# Patient Record
Sex: MALE | Race: ASIAN | Employment: OTHER | URBAN - METROPOLITAN AREA
[De-identification: names, ages, dates, MRNs, and addresses within clinical notes are randomized per-mention and may not be internally consistent; named-entity substitution may affect disease eponyms.]

---

## 2017-01-18 ENCOUNTER — OFFICE VISIT (OUTPATIENT)
Dept: PEDIATRICS | Facility: CLINIC | Age: 62
End: 2017-01-18
Payer: COMMERCIAL

## 2017-01-18 VITALS
WEIGHT: 137 LBS | HEART RATE: 69 BPM | OXYGEN SATURATION: 96 % | SYSTOLIC BLOOD PRESSURE: 110 MMHG | DIASTOLIC BLOOD PRESSURE: 70 MMHG | HEIGHT: 62 IN | TEMPERATURE: 98.9 F | BODY MASS INDEX: 25.21 KG/M2

## 2017-01-18 DIAGNOSIS — E78.5 HYPERLIPIDEMIA LDL GOAL <100: ICD-10-CM

## 2017-01-18 DIAGNOSIS — Z11.59 NEED FOR HEPATITIS C SCREENING TEST: ICD-10-CM

## 2017-01-18 DIAGNOSIS — I10 ESSENTIAL HYPERTENSION: ICD-10-CM

## 2017-01-18 DIAGNOSIS — E11.9 TYPE 2 DIABETES MELLITUS WITHOUT COMPLICATION, WITHOUT LONG-TERM CURRENT USE OF INSULIN (H): Primary | ICD-10-CM

## 2017-01-18 LAB
ALBUMIN SERPL-MCNC: 3.7 G/DL (ref 3.4–5)
ALP SERPL-CCNC: 78 U/L (ref 40–150)
ALT SERPL W P-5'-P-CCNC: 19 U/L (ref 0–70)
ANION GAP SERPL CALCULATED.3IONS-SCNC: 6 MMOL/L (ref 3–14)
AST SERPL W P-5'-P-CCNC: 13 U/L (ref 0–45)
BILIRUB SERPL-MCNC: 0.8 MG/DL (ref 0.2–1.3)
BUN SERPL-MCNC: 13 MG/DL (ref 7–30)
CALCIUM SERPL-MCNC: 9.1 MG/DL (ref 8.5–10.1)
CHLORIDE SERPL-SCNC: 101 MMOL/L (ref 94–109)
CHOLEST SERPL-MCNC: 171 MG/DL
CO2 SERPL-SCNC: 30 MMOL/L (ref 20–32)
CREAT SERPL-MCNC: 1.06 MG/DL (ref 0.66–1.25)
CREAT UR-MCNC: 37 MG/DL
GFR SERPL CREATININE-BSD FRML MDRD: 71 ML/MIN/1.7M2
GLUCOSE SERPL-MCNC: 156 MG/DL (ref 70–99)
HBA1C MFR BLD: 6.9 % (ref 4.3–6)
HCV AB SERPL QL IA: ABNORMAL
HDLC SERPL-MCNC: 53 MG/DL
LDLC SERPL CALC-MCNC: 93 MG/DL
MICROALBUMIN UR-MCNC: 11 MG/L
MICROALBUMIN/CREAT UR: 30.19 MG/G CR (ref 0–17)
NONHDLC SERPL-MCNC: 118 MG/DL
POTASSIUM SERPL-SCNC: 4.3 MMOL/L (ref 3.4–5.3)
PROT SERPL-MCNC: 7.3 G/DL (ref 6.8–8.8)
SODIUM SERPL-SCNC: 137 MMOL/L (ref 133–144)
TRIGL SERPL-MCNC: 126 MG/DL

## 2017-01-18 PROCEDURE — 80061 LIPID PANEL: CPT | Performed by: INTERNAL MEDICINE

## 2017-01-18 PROCEDURE — 86803 HEPATITIS C AB TEST: CPT | Performed by: INTERNAL MEDICINE

## 2017-01-18 PROCEDURE — 82043 UR ALBUMIN QUANTITATIVE: CPT | Performed by: INTERNAL MEDICINE

## 2017-01-18 PROCEDURE — 99207 C FOOT EXAM  NO CHARGE: CPT | Performed by: INTERNAL MEDICINE

## 2017-01-18 PROCEDURE — 87522 HEPATITIS C REVRS TRNSCRPJ: CPT | Performed by: INTERNAL MEDICINE

## 2017-01-18 PROCEDURE — 83036 HEMOGLOBIN GLYCOSYLATED A1C: CPT | Performed by: INTERNAL MEDICINE

## 2017-01-18 PROCEDURE — 36415 COLL VENOUS BLD VENIPUNCTURE: CPT | Performed by: INTERNAL MEDICINE

## 2017-01-18 PROCEDURE — 99214 OFFICE O/P EST MOD 30 MIN: CPT | Performed by: INTERNAL MEDICINE

## 2017-01-18 PROCEDURE — 80053 COMPREHEN METABOLIC PANEL: CPT | Performed by: INTERNAL MEDICINE

## 2017-01-18 NOTE — PROGRESS NOTES
"  SUBJECTIVE:                                                    Nader Alfaro is a 61 year old male who presents to clinic today for the following health issues:      Diabetes Follow-up      Patient is checking blood sugars: 2 times a week, 100-120    Diabetic concerns: None     Symptoms of hypoglycemia (low blood sugar): none     Paresthesias (numbness or burning in feet) or sores: No     Date of last diabetic eye exam: none     Hyperlipidemia Follow-Up       Rate your low fat/cholesterol diet?: fair    Taking statin?  Yes, no muscle aches from statin    Other lipid medications/supplements?:  none     Hypertension Follow-up      Outpatient blood pressures are being checked at home.  Results are 110/79.    Low Salt Diet: no added salt         Amount of exercise or physical activity: slight    Problems taking medications regularly: No    Medication side effects: none    Diet: diabetes       Problem list and histories reviewed & adjusted, as indicated.    Labs reviewed in EPIC    ROS:  Constitutional, HEENT, cardiovascular, pulmonary, gi and gu systems are negative, except as otherwise noted.    OBJECTIVE:                                                    /70 mmHg  Pulse 69  Temp(Src) 98.9  F (37.2  C) (Oral)  Ht 5' 2.25\" (1.581 m)  Wt 137 lb (62.143 kg)  BMI 24.86 kg/m2  SpO2 96%  Body mass index is 24.86 kg/(m^2).  GENERAL: healthy, alert and no distress  EYES: Eyes grossly normal to inspection, PERRL and conjunctivae and sclerae normal  HENT: ear canals and TM's normal, nose and mouth without ulcers or lesions  NECK: no adenopathy, no asymmetry, masses, or scars and thyroid normal to palpation  RESP: lungs clear to auscultation - no rales, rhonchi or wheezes  CV: regular rate and rhythm, normal S1 S2, no S3 or S4, no murmur, click or rub, no peripheral edema and peripheral pulses strong  ABDOMEN: soft, nontender, no hepatosplenomegaly, no masses and bowel sounds normal  MS: no gross musculoskeletal " defects noted, no edema  Foot examination reveals normal to inspection without bunion or superficial skin lesions. No nail fungus changes. Pulses normal. Sensation to monofilament intact throughout both feet.      ASSESSMENT/PLAN:                                                        ICD-10-CM    1. Type 2 diabetes mellitus without complication, without long-term current use of insulin (H) E11.9 Comprehensive metabolic panel     Hemoglobin A1c     Lipid panel reflex to direct LDL     Albumin Random Urine Quantitative     FOOT EXAM   2. Hyperlipidemia LDL goal <100 E78.5 Comprehensive metabolic panel     Lipid panel reflex to direct LDL   3. Hypertension I10 Comprehensive metabolic panel     Lipid panel reflex to direct LDL   4. Need for hepatitis C screening test Z11.59 Hepatitis C Screen Reflex to HCV RNA Quant and Genotype        Patient Instructions   MY DIABETES TODAY:    1)  Goal A1C is under 7%  Mine is:    A1C      6.5   5/13/2016    2)  Goal LDL (bad cholesterol) under 100  (measured at least yearly)- I am currently at: LDL       85   5/13/2016    3)  Goal blood pressure under 130/80 - mine was 110/70 today    4)  Take aspirin daily     5)  No tobacco use    ACTION PLAN CHANGES FROM TODAY:    Care Plan changes:    1) No medication changes today    Labs:     Check labwork today. With your next visit, you will not need to fast.     Follow up:    I will follow up with my physician:  In six months.      Mazin Ford MD  [facility]

## 2017-01-18 NOTE — MR AVS SNAPSHOT
After Visit Summary   1/18/2017    Nader Alfaro    MRN: [id number]           Patient Information     Date Of Birth          1955        Visit Information        Provider Department      1/18/2017 7:00 AM Mazin Ford MD; [employer] [facility] [employer] [facility]        Today's Diagnoses     Type 2 diabetes mellitus without complication, without long-term current use of insulin (H)    -  1     Hyperlipidemia LDL goal <100         Hypertension         Need for hepatitis C screening test           Care Instructions    MY DIABETES TODAY:    1)  Goal A1C is under 7%  Mine is:    A1C      6.5   5/13/2016    2)  Goal LDL (bad cholesterol) under 100  (measured at least yearly)- I am currently at: LDL       85   5/13/2016    3)  Goal blood pressure under 130/80 - mine was 110/70 today    4)  Take aspirin daily     5)  No tobacco use    ACTION PLAN CHANGES FROM TODAY:    Care Plan changes:    1) No medication changes today    Labs:     Check labwork today. With your next visit, you will not need to fast.     Follow up:    I will follow up with my physician:  In six months.          Follow-ups after your visit        Who to contact     If you have questions or need follow up information about today's clinic visit or your schedule please contact [facility] directly at [phone].  Normal or non-critical lab and imaging results will be communicated to you by MyChart, letter or phone within 4 business days after the clinic has received the results. If you do not hear from us within 7 days, please contact the clinic through MyChart or phone. If you have a critical or abnormal lab result, we will notify you by phone as soon as possible.  Submit refill requests through [employer] or call your pharmacy and they will forward the refill request to us. Please allow 3 business days for your refill to be completed.          Additional Information About Your Visit        MyChart Information      "Maana lets you send messages to your doctor, view your test results, renew your prescriptions, schedule appointments and more. To sign up, go to [email or web address] . Click on \"Log in\" on the left side of the screen, which will take you to the Welcome page. Then click on \"Sign up Now\" on the right side of the page.     You will be asked to enter the access code listed below, as well as some personal information. Please follow the directions to create your username and password.     Your access code is: [email or web address]  Expires: 2017  9:12 AM     Your access code will  in 90 days. If you need help or a new code, please call your [facility] clinic or [phone].        Care EveryWhere ID     This is your Care EveryWhere ID. This could be used by other organizations to access your [facility] medical records  [id number]        Your Vitals Were     Pulse Temperature Height BMI (Body Mass Index) Pulse Oximetry       69 98.9  F (37.2  C) (Oral) 5' 2.25\" (1.581 m) 24.86 kg/m2 96%        Blood Pressure from Last 3 Encounters:   17 110/70   16 108/72   16 120/80    Weight from Last 3 Encounters:   17 137 lb (62.143 kg)   16 149 lb (67.586 kg)   09/09/15 140 lb (63.504 kg)              We Performed the Following     Albumin Random Urine Quantitative     Comprehensive metabolic panel     FOOT EXAM     Hemoglobin A1c     Hepatitis C Screen Reflex to HCV RNA Quant and Genotype     Lipid panel reflex to direct LDL        Primary Care Provider Office Phone # Fax #    Mazin Ford [phone]       [facility] [address] [place] [address]        Thank you!     Thank you for choosing [facility]  for your care. Our goal is always to provide you with excellent care. Hearing back from our patients is one way we can continue to improve our services. Please take a few minutes to complete the written survey that you may receive in the mail after your " visit with us. Thank you!             Your Updated Medication List - Protect others around you: Learn how to safely use, store and throw away your medicines at www.disposemymeds.org.          This list is accurate as of: 1/18/17  7:29 AM.  Always use your most recent med list.                   Brand Name Dispense Instructions for use    ASPIRIN NOT PRESCRIBED    INTENTIONAL    0 each    1 each continuous prn for other Antiplatelet medication not prescribed intentionally due to GI Issues / Ulcer Disease       glipiZIDE 5 MG 24 hr tablet    glipiZIDE XL    90 tablet    Take 1 tablet (5 mg) by mouth daily       lisinopril 10 MG tablet    PRINIVIL/ZESTRIL    90 tablet    Take 1 tablet (10 mg) by mouth every morning       lovastatin 40 MG tablet    MEVACOR    90 tablet    Take 1 tablet (40 mg) by mouth At Bedtime       metFORMIN 1000 MG tablet    GLUCOPHAGE    180 tablet    Take 1 tablet (1,000 mg) by mouth 2 times daily (with meals) with breakfast and dinner.       omeprazole 40 MG capsule    priLOSEC    90 capsule    Take 1 capsule (40 mg) by mouth daily Take 30 minutes before a meal.       triamcinolone 0.1 % cream    KENALOG    80 g    Apply sparingly to affected area three times daily as needed

## 2017-01-18 NOTE — PATIENT INSTRUCTIONS
MY DIABETES TODAY:    1)  Goal A1C is under 7%  Mine is:    A1C      6.5   5/13/2016    2)  Goal LDL (bad cholesterol) under 100  (measured at least yearly)- I am currently at: LDL       85   5/13/2016    3)  Goal blood pressure under 130/80 - mine was 110/70 today    4)  Take aspirin daily     5)  No tobacco use    ACTION PLAN CHANGES FROM TODAY:    Care Plan changes:    1) No medication changes today    Labs:     Check labwork today. With your next visit, you will not need to fast.     Follow up:    I will follow up with my physician:  In six months.

## 2017-01-18 NOTE — Clinical Note
[facility]  [address] [place] [id number]                  [phone]   January 23, 2017    Doe BECKI Dominic  [address] [facility] [address]        Nader:    Your tests are all complete. The results show:    1. Your hepatitis C screen had the initial test positive, but the better test was negative (normal). This indicates that you do not have hepatitis C.     The initial screen could be a false result, or potentially indicate that you were exposed to hepatitis C and it is now gone. You do not need any treatment for hepatitis C and it should not affect your health at all.    2. Your hemoglobin A1C (long-range glucose control) indicates very good diabetes control.    3. Your metabolic panel, including liver and kidney function, glucose (blood sugar) and electrolytes, is within expected limits.    4. Your microalbumin level (check for protein in the urine) is just above normal. This should be checked once per year to ensure that your diabetes is not adversely affecting your kidney function.     5. Your cholesterol levels look great!    Your Lipid Goals:  Cholesterol: Desirable is less than 200, Borderline is 200-240  HDL (Good Cholesterol): Desirable is greater than 40  LDL (Bad Cholesterol): Desirable is less than 100, Borderline 100-130  Triglycerides (Fats in the Blood): Desirable is less than 150,  Borderline is 150-200      Please feel free to contact me if you have any questions or concerns.      Mazin Ford MD        Results for orders placed or performed in visit on 01/18/17   Comprehensive metabolic panel   Result Value Ref Range    Sodium 137 133 - 144 mmol/L    Potassium 4.3 3.4 - 5.3 mmol/L    Chloride 101 94 - 109 mmol/L    Carbon Dioxide 30 20 - 32 mmol/L    Anion Gap 6 3 - 14 mmol/L    Glucose 156 (H) 70 - 99 mg/dL    Urea Nitrogen 13 7 - 30 mg/dL    Creatinine 1.06 0.66 - 1.25 mg/dL    GFR Estimate 71 >60 mL/min/1.7m2    GFR Estimate If Black 86  >60 mL/min/1.7m2    Calcium 9.1 8.5 - 10.1 mg/dL    Bilirubin Total 0.8 0.2 - 1.3 mg/dL    Albumin 3.7 3.4 - 5.0 g/dL    Protein Total 7.3 6.8 - 8.8 g/dL    Alkaline Phosphatase 78 40 - 150 U/L    ALT 19 0 - 70 U/L    AST 13 0 - 45 U/L   Hemoglobin A1c   Result Value Ref Range    Hemoglobin A1C 6.9 (H) 4.3 - 6.0 %   Lipid panel reflex to direct LDL   Result Value Ref Range    Cholesterol 171 <200 mg/dL    Triglycerides 126 <150 mg/dL    HDL Cholesterol 53 >39 mg/dL    LDL Cholesterol Calculated 93 <100 mg/dL    Non HDL Cholesterol 118 <130 mg/dL   Albumin Random Urine Quantitative   Result Value Ref Range    Creatinine Urine 37 mg/dL    Albumin Urine mg/L 11 mg/L    Albumin Urine mg/g Cr 30.19 (H) 0 - 17 mg/g Cr   Hepatitis C Screen Reflex to HCV RNA Quant and Genotype   Result Value Ref Range    Hepatitis C Antibody (A) NR     Reactive   A reactive result indicates one of the following 1) current HCV infection 2)   past HCV infection that has resolved or 3) false positivity. The CDC recommends   that a reactive result should be followed by Nucleic acid testing for HCV RNA.  If HCV RNA is detected, that indicates current HCV infection. If HCV RNA is not   detected, that indicates either past, resolved HCV infection, or false HCV   antibody positivity.   Assay performance characteristics have not been established for newborns,   infants, and children     Hepatitis C RNA Quantitative   Result Value Ref Range    HCV RNA Quant IU/ml  HCVND [IU]/mL     HCV RNA Not Detected   The GIL AmpliPrep/GIL TaqMan HCV Test is an FDA-approved in vitro nucleic   acid amplification test for the quantitation of HCV RNA in human plasma (ETDA   plasma) or serum using the GIL AmpliPrep Instrument for automated viral   nucleic acid extraction and the GIL TaqMan Analyzer or GIL TaqMan for   automated Real Time PCR amplification and detection of the viral nucleic acid   target.   Titer results are reported in International Units/mL  (IU/mL) using the 1st WHO   International standard for HCV for Nucleic Acid Amplification based assays.      Log of HCV RNA Qt Not Calculated <1.2 Log IU/mL

## 2017-01-18 NOTE — NURSING NOTE
"Chief Complaint   Patient presents with     Diabetes     follow up       Initial /70 mmHg  Pulse 69  Temp(Src) 98.9  F (37.2  C) (Oral)  Ht 5' 2.25\" (1.581 m)  Wt 137 lb (62.143 kg)  BMI 24.86 kg/m2  SpO2 96% Estimated body mass index is 24.86 kg/(m^2) as calculated from the following:    Height as of this encounter: 5' 2.25\" (1.581 m).    Weight as of this encounter: 137 lb (62.143 kg).  BP completed using cuff size: regular.Malia HASSAN MA      "

## 2017-01-19 LAB
HCV RNA SERPL NAA+PROBE-ACNC: NORMAL [IU]/ML
HCV RNA SERPL NAA+PROBE-LOG IU: NORMAL LOG IU/ML

## 2017-02-20 ENCOUNTER — TRANSFERRED RECORDS (OUTPATIENT)
Dept: HEALTH INFORMATION MANAGEMENT | Facility: CLINIC | Age: 62
End: 2017-02-20

## 2017-04-06 ENCOUNTER — RADIANT APPOINTMENT (OUTPATIENT)
Dept: GENERAL RADIOLOGY | Facility: CLINIC | Age: 62
End: 2017-04-06
Attending: PHYSICIAN ASSISTANT
Payer: COMMERCIAL

## 2017-04-06 ENCOUNTER — OFFICE VISIT (OUTPATIENT)
Dept: PEDIATRICS | Facility: CLINIC | Age: 62
End: 2017-04-06
Payer: COMMERCIAL

## 2017-04-06 VITALS
TEMPERATURE: 99.1 F | SYSTOLIC BLOOD PRESSURE: 92 MMHG | WEIGHT: 135.6 LBS | DIASTOLIC BLOOD PRESSURE: 60 MMHG | OXYGEN SATURATION: 95 % | HEIGHT: 62 IN | BODY MASS INDEX: 24.95 KG/M2 | HEART RATE: 72 BPM

## 2017-04-06 DIAGNOSIS — J10.1 INFLUENZA B: Primary | ICD-10-CM

## 2017-04-06 DIAGNOSIS — R05.9 COUGH: ICD-10-CM

## 2017-04-06 DIAGNOSIS — J02.9 ACUTE PHARYNGITIS, UNSPECIFIED ETIOLOGY: ICD-10-CM

## 2017-04-06 LAB
DEPRECATED S PYO AG THROAT QL EIA: NORMAL
FLUAV+FLUBV AG SPEC QL: ABNORMAL
FLUAV+FLUBV AG SPEC QL: NEGATIVE
MICRO REPORT STATUS: NORMAL
SPECIMEN SOURCE: ABNORMAL
SPECIMEN SOURCE: NORMAL

## 2017-04-06 PROCEDURE — 71020 XR CHEST 2 VW: CPT

## 2017-04-06 PROCEDURE — 87081 CULTURE SCREEN ONLY: CPT | Performed by: PHYSICIAN ASSISTANT

## 2017-04-06 PROCEDURE — 87880 STREP A ASSAY W/OPTIC: CPT | Performed by: PHYSICIAN ASSISTANT

## 2017-04-06 PROCEDURE — 99214 OFFICE O/P EST MOD 30 MIN: CPT | Performed by: PHYSICIAN ASSISTANT

## 2017-04-06 PROCEDURE — 87804 INFLUENZA ASSAY W/OPTIC: CPT | Performed by: PHYSICIAN ASSISTANT

## 2017-04-06 RX ORDER — CODEINE PHOSPHATE AND GUAIFENESIN 10; 100 MG/5ML; MG/5ML
1-2 SOLUTION ORAL
Qty: 40 ML | Refills: 0 | Status: SHIPPED | OUTPATIENT
Start: 2017-04-06 | End: 2017-04-08

## 2017-04-06 RX ORDER — AZITHROMYCIN 250 MG/1
TABLET, FILM COATED ORAL
Qty: 6 TABLET | Refills: 0 | Status: SHIPPED | OUTPATIENT
Start: 2017-04-06 | End: 2017-07-28

## 2017-04-06 RX ORDER — ALBUTEROL SULFATE 90 UG/1
2 AEROSOL, METERED RESPIRATORY (INHALATION) EVERY 6 HOURS PRN
Qty: 1 INHALER | Refills: 0 | Status: SHIPPED | OUTPATIENT
Start: 2017-04-06 | End: 2018-08-17

## 2017-04-06 NOTE — PATIENT INSTRUCTIONS
Acute Bronchitis  Your healthcare provider has told you that you have acute bronchitis. Bronchitis is infection or inflammation of the bronchial tubes (airways in the lungs). Normally, air moves easily in and out of the airways. Bronchitis narrows the airways, making it harder for air to flow in and out of the lungs. This causes symptoms such as shortness of breath, coughing, and wheezing. Bronchitis can be  acute  or  chronic.  Acute means the condition comes on quickly and goes away in a short time. Chronic means a condition lasts a long time and often comes back. Read on to learn more about acute bronchitis.    What causes acute bronchitis?  Acute bronchitis almost always starts as a viral respiratory infection, such as a cold or the flu. Certain factors make it more likely for a cold or flu to turn into bronchitis. These include being very young or very old or having a heart or lung problem. Cigarette smoking also makes bronchitis more likely.  When bronchitis develops, the airways become swollen. The airways may also become infected with bacteria. This is known as a secondary infection.  Diagnosing acute bronchitis  Your healthcare provider will examine you and ask about your symptoms and health history. You may also have a sputum culture to test the fluid in your lungs. Chest X-rays may be done to look for infection in the lungs.  Treating acute bronchitis  Bronchitis usually clears up as the cold or flu goes away. You can help feel better faster by doing the following:    Take medicine as directed. You may be told to take ibuprofen or other over-the-counter medicines. These help relieve inflammation in your bronchial tubes. Your doctor may prescribe an inhaler to help open up the bronchial tubes. Most of the time, acute bronchitis is caused by a viral infection. Antibiotics are usually not prescribed for viral infections.    Drink plenty of fluids, such as water, juice, or warm soup. Fluids loosen mucus so  that you can cough it up. This helps you breathe more easily. Fluids also prevent dehydration.    Make sure you get plenty of rest.    Do not smoke. Do not allow anyone else to smoke in your home.  Recovery and follow-up  Follow up with your doctor as you are told. You will likely feel better in a week or two. But a dry cough can linger beyond that time. Let your doctor know if you still have symptoms (other than a dry cough) after 2 weeks. If you re prone to getting bronchial infections, let your doctor know. And take steps to protect yourself from future infections. These steps include stopping smoking and avoiding tobacco smoke, washing your hands often, and getting a yearly flu shot.  When to call the doctor  Call the doctor if you have any of the following:    Fever of 100.4 F (38.0 C) higher    Symptoms that get worse, or new symptoms    Trouble breathing    Symptoms that don t start to improve within a week, or within 3 days of taking antibiotics     2883-1604 [employer]. [address] [place] [address]. All rights reserved. This information is not intended as a substitute for professional medical care. Always follow your healthcare professional's instructions.        Influenza (Adult)    Influenza is also called the flu. It is a viral illness that affects the air passages of your lungs. It is different from the common cold. The flu can easily be passed from one to person to another. It may be spread through the air by coughing and sneezing. Or it can be spread by touching the sick person and then touching your own eyes, nose, or mouth.  The flu starts 1 to 3 days after you are exposed to the flu virus. It may last for 1 to 2 weeks. You usually don t need to take antibiotics unless you have a complication. This might be an ear or sinus infection or pneumonia.  Symptoms of the flu may be mild or severe. They can include extreme tiredness (wanting to stay in bed all day), chills,  fevers, muscle aches, soreness with eye movement, headache, and a dry, hacking cough.  Home care  Follow these guidelines when caring for yourself at home:    Avoid being around cigarette smoke, whether yours or other people s.    Acetaminophen or ibuprofen will help ease your fever, muscle aches, and headache. Don t give aspirin to anyone younger than 18 who has the flu. Aspirin can harm the liver.    Nausea and loss of appetite are common with the flu. Eat light meals. Drink 6 to 8 glasses of liquids every day. Good choices are water, sport drinks, soft drinks without caffeine, juices, tea, and soup. Extra fluids will also help loosen secretions in your nose and lungs.    Over-the-counter cold medicines will not make the flu go away faster. But the medicines may help with coughing, sore throat, and congestion in your nose and sinuses. Don t use a decongestant if you have high blood pressure.    Stay home until your fever has been gone for at least 24 hours without using medicine to reduce fever.  Follow-up care  Follow up with your health care provider, or as advised, if you are not getting better over the next week.  If you are 65 or older, talk with your provider about getting a pneumococcal vaccine every 5 years. You should also get this vaccine if you have chronic asthma or COPD. All adults should get a flu vaccine every fall. Ask your provider about this.  When to seek medical advice  Call your health care provider right away if any of these occur:    Cough with lots of colored sputum (mucus) or blood in your sputum    Chest pain, shortness of breath, wheezing, or difficulty breathing    Severe headache, or face, neck, or ear pain    New rash with fever    Fever of 101 F (38 C) oral or higher that doesn t get better with fever medicine    Confusion, behavior change, or seizure    Severe weakness or dizziness    You get a fever or cough after getting better for a few days       3550-5431 [employer] [address] [place] [address]. All rights reserved. This information is not intended as a substitute for professional medical care. Always follow your healthcare professional's instructions.

## 2017-04-06 NOTE — PROGRESS NOTES
"  SUBJECTIVE:                                                    Naedr Alfaro is a 61 year old male who presents to clinic today for the following health issues:      Acute Illness   Acute illness concerns: flu symptoms   Onset: Monday morning 4 days     Fever: no    Chills/Sweats: YES    Headache (location?): YES    Sinus Pressure:YES- post-nasal drainage and facial pain    Conjunctivitis:  no    Ear Pain: no    Rhinorrhea: YES    Congestion: YES    Sore Throat: YES, hurts to swallow      Cough: YES-non-productive    Wheeze: no    Decreased Appetite: YES    Nausea: YES    Vomiting: no    Diarrhea:  no    Dysuria/Freq.: no    Fatigue/Achiness: yes  Body aches, weakness     Sick/Strep Exposure: YES     Therapies Tried and outcome: tylenol cold, 500 mg tylenol this morning (noon)    ROS:  Follow up with PCP if symptoms worsen or fail to improve      OBJECTIVE:                                                    BP 92/60  Pulse 72  Temp 99.1  F (37.3  C) (Oral)  Ht 5' 2.25\" (1.581 m)  Wt 135 lb 9.6 oz (61.5 kg)  SpO2 95%  BMI 24.6 kg/m2  Body mass index is 24.6 kg/(m^2).  GENERAL: healthy, alert and no distress  HENT: ear canals and TM's normal, nose and mouth without ulcers or lesions  NECK: no adenopathy, no asymmetry, masses, or scars and thyroid normal to palpation  RESP: lungs clear to auscultation - no rales, rhonchi or wheezes  CV: regular rate and rhythm, normal S1 S2, no S3 or S4, no murmur, click or rub, no peripheral edema and peripheral pulses strong  ABDOMEN: soft, nontender, no hepatosplenomegaly, no masses and bowel sounds normal  MS: no gross musculoskeletal defects noted, no edema    Diagnostic Test Results:    Results for orders placed or performed in visit on 04/06/17   Influenza A/B antigen   Result Value Ref Range    Influenza A/B Agn Specimen Nasal     Influenza A Negative NEG    Influenza B (A) NEG     Positive   Test results must be correlated with clinical data. If necessary, results   " should be confirmed by a molecular assay or viral culture.     Strep, Rapid Screen   Result Value Ref Range    Specimen Description Throat     Rapid Strep A Screen       NEGATIVE: No Group A streptococcal antigen detected by immunoassay, await   culture report.      Micro Report Status FINAL 04/06/2017           ASSESSMENT/PLAN:                                                      (J10.1) Influenza B  (primary encounter diagnosis)  Comment: Too late for influenza therapy.  [place]  AB provided for secondary infection coverage  Plan: Influenza A/B antigen, XR Chest 2 Views,         guaiFENesin-codeine (ROBITUSSIN AC) 100-10         MG/5ML SOLN solution, azithromycin (ZITHROMAX)         250 MG tablet, albuterol (PROAIR HFA/PROVENTIL         HFA/VENTOLIN HFA) 108 (90 BASE) MCG/ACT Inhaler  Follow up with PCP or medical facility if symptoms worsen or fail to improve      (J02.9) Acute pharyngitis, unspecified etiology  Plan: Strep, Rapid Screen, Beta strep group A culture        Culture pending  TLC and conservative measures recommended      Juanito Miles PA-C  [facility] [place]

## 2017-04-06 NOTE — MR AVS SNAPSHOT
After Visit Summary   4/6/2017    Nader Alfaro    MRN: [id number]           Patient Information     Date Of Birth          1955        Visit Information        Provider Department      4/6/2017 2:30 PM Juanito Miles PA-C; [facility]        Today's Diagnoses     Cough    -  1    Acute pharyngitis, unspecified etiology        Acute bronchitis with coexisting condition requiring prophylactic treatment          Care Instructions      Acute Bronchitis  Your healthcare provider has told you that you have acute bronchitis. Bronchitis is infection or inflammation of the bronchial tubes (airways in the lungs). Normally, air moves easily in and out of the airways. Bronchitis narrows the airways, making it harder for air to flow in and out of the lungs. This causes symptoms such as shortness of breath, coughing, and wheezing. Bronchitis can be  acute  or  chronic.  Acute means the condition comes on quickly and goes away in a short time. Chronic means a condition lasts a long time and often comes back. Read on to learn more about acute bronchitis.    What causes acute bronchitis?  Acute bronchitis almost always starts as a viral respiratory infection, such as a cold or the flu. Certain factors make it more likely for a cold or flu to turn into bronchitis. These include being very young or very old or having a heart or lung problem. Cigarette smoking also makes bronchitis more likely.  When bronchitis develops, the airways become swollen. The airways may also become infected with bacteria. This is known as a secondary infection.  Diagnosing acute bronchitis  Your healthcare provider will examine you and ask about your symptoms and health history. You may also have a sputum culture to test the fluid in your lungs. Chest X-rays may be done to look for infection in the lungs.  Treating acute bronchitis  Bronchitis usually clears up as the cold or flu goes away.  You can help feel better faster by doing the following:    Take medicine as directed. You may be told to take ibuprofen or other over-the-counter medicines. These help relieve inflammation in your bronchial tubes. Your doctor may prescribe an inhaler to help open up the bronchial tubes. Most of the time, acute bronchitis is caused by a viral infection. Antibiotics are usually not prescribed for viral infections.    Drink plenty of fluids, such as water, juice, or warm soup. Fluids loosen mucus so that you can cough it up. This helps you breathe more easily. Fluids also prevent dehydration.    Make sure you get plenty of rest.    Do not smoke. Do not allow anyone else to smoke in your home.  Recovery and follow-up  Follow up with your doctor as you are told. You will likely feel better in a week or two. But a dry cough can linger beyond that time. Let your doctor know if you still have symptoms (other than a dry cough) after 2 weeks. If you re prone to getting bronchial infections, let your doctor know. And take steps to protect yourself from future infections. These steps include stopping smoking and avoiding tobacco smoke, washing your hands often, and getting a yearly flu shot.  When to call the doctor  Call the doctor if you have any of the following:    Fever of 100.4 F (38.0 C) higher    Symptoms that get worse, or new symptoms    Trouble breathing    Symptoms that don t start to improve within a week, or within 3 days of taking antibiotics     7008-3604 [employer]. [address] [place] [address]. All rights reserved. This information is not intended as a substitute for professional medical care. Always follow your healthcare professional's instructions.        Influenza (Adult)    Influenza is also called the flu. It is a viral illness that affects the air passages of your lungs. It is different from the common cold. The flu can easily be passed from one to person to another. It may  be spread through the air by coughing and sneezing. Or it can be spread by touching the sick person and then touching your own eyes, nose, or mouth.  The flu starts 1 to 3 days after you are exposed to the flu virus. It may last for 1 to 2 weeks. You usually don t need to take antibiotics unless you have a complication. This might be an ear or sinus infection or pneumonia.  Symptoms of the flu may be mild or severe. They can include extreme tiredness (wanting to stay in bed all day), chills, fevers, muscle aches, soreness with eye movement, headache, and a dry, hacking cough.  Home care  Follow these guidelines when caring for yourself at home:    Avoid being around cigarette smoke, whether yours or other people s.    Acetaminophen or ibuprofen will help ease your fever, muscle aches, and headache. Don t give aspirin to anyone younger than 18 who has the flu. Aspirin can harm the liver.    Nausea and loss of appetite are common with the flu. Eat light meals. Drink 6 to 8 glasses of liquids every day. Good choices are water, sport drinks, soft drinks without caffeine, juices, tea, and soup. Extra fluids will also help loosen secretions in your nose and lungs.    Over-the-counter cold medicines will not make the flu go away faster. But the medicines may help with coughing, sore throat, and congestion in your nose and sinuses. Don t use a decongestant if you have high blood pressure.    Stay home until your fever has been gone for at least 24 hours without using medicine to reduce fever.  Follow-up care  Follow up with your health care provider, or as advised, if you are not getting better over the next week.  If you are 65 or older, talk with your provider about getting a pneumococcal vaccine every 5 years. You should also get this vaccine if you have chronic asthma or COPD. All adults should get a flu vaccine every fall. Ask your provider about this.  When to seek medical advice  Call your health care provider right  "away if any of these occur:    Cough with lots of colored sputum (mucus) or blood in your sputum    Chest pain, shortness of breath, wheezing, or difficulty breathing    Severe headache, or face, neck, or ear pain    New rash with fever    Fever of 101 F (38 C) oral or higher that doesn t get better with fever medicine    Confusion, behavior change, or seizure    Severe weakness or dizziness    You get a fever or cough after getting better for a few days       6411-0899 [employer]. [address] [place] [address]. All rights reserved. This information is not intended as a substitute for professional medical care. Always follow your healthcare professional's instructions.              Follow-ups after your visit        Who to contact     If you have questions or need follow up information about today's clinic visit or your schedule please contact [facility] [place] directly at [phone].  Normal or non-critical lab and imaging results will be communicated to you by MyChart, letter or phone within 4 business days after the clinic has received the results. If you do not hear from us within 7 days, please contact the clinic through [email or web address] or phone. If you have a critical or abnormal lab result, we will notify you by phone as soon as possible.  Submit refill requests through [employer] or call your pharmacy and they will forward the refill request to us. Please allow 3 business days for your refill to be completed.          Additional Information About Your Visit        MyChart Information     [employer] lets you send messages to your doctor, view your test results, renew your prescriptions, schedule appointments and more. To sign up, go to [email or web address] . Click on \"Log in\" on the left side of the screen, which will take you to the Welcome page. Then click on \"Sign up Now\" on the right side of the page.     You will be asked to enter the access code listed below, as well as some " "personal information. Please follow the directions to create your username and password.     Your access code is: [email or web address]  Expires: 2017  3:28 PM     Your access code will  in 90 days. If you need help or a new code, please call your [place] clinic or [phone].        Care EveryWhere ID     This is your Care EveryWhere ID. This could be used by other organizations to access your [place] medical records  [id number]        Your Vitals Were     Pulse Temperature Height Pulse Oximetry BMI (Body Mass Index)       72 99.1  F (37.3  C) (Oral) 5' 2.25\" (1.581 m) 95% 24.6 kg/m2        Blood Pressure from Last 3 Encounters:   17 92/60   17 110/70   16 108/72    Weight from Last 3 Encounters:   17 135 lb 9.6 oz (61.5 kg)   17 137 lb (62.1 kg)   16 149 lb (67.6 kg)              We Performed the Following     Influenza A/B antigen     Strep, Rapid Screen          Today's Medication Changes          These changes are accurate as of: 17  3:28 PM.  If you have any questions, ask your nurse or doctor.               Start taking these medicines.        Dose/Directions    albuterol 108 (90 BASE) MCG/ACT Inhaler   Commonly known as:  PROAIR HFA/PROVENTIL HFA/VENTOLIN HFA   Used for:  Acute bronchitis with coexisting condition requiring prophylactic treatment   Started by:  Juanito Miles PA-C        Dose:  2 puff   Inhale 2 puffs into the lungs every 6 hours as needed for shortness of breath / dyspnea or wheezing   Quantity:  1 Inhaler   Refills:  0       azithromycin 250 MG tablet   Commonly known as:  ZITHROMAX   Used for:  Cough   Started by:  Juanito Miles PA-C        Two tablets first day, then one tablet daily for four days.   Quantity:  6 tablet   Refills:  0       guaiFENesin-codeine 100-10 MG/5ML Soln solution   Commonly known as:  ROBITUSSIN AC   Used for:  Cough   Started by:  Juanito Miles PA-C        Dose:  1-2 tsp.   Take " 5-10 mLs by mouth nightly as needed for cough   Quantity:  40 mL   Refills:  0            Where to get your medicines      These medications were sent to [facility] - [address]   [address] [place] [address] [address]     Phone:  [phone]     albuterol 108 (90 BASE) MCG/ACT Inhaler    azithromycin 250 MG tablet         Some of these will need a paper prescription and others can be bought over the counter.  Ask your nurse if you have questions.     Bring a paper prescription for each of these medications     guaiFENesin-codeine 100-10 MG/5ML Soln solution                Primary Care Provider Office Phone # Fax #    Mazin Ford [phone]       [facility] [address] [place] [address]        Thank you!     Thank you for choosing [facility]  for your care. Our goal is always to provide you with excellent care. Hearing back from our patients is one way we can continue to improve our services. Please take a few minutes to complete the written survey that you may receive in the mail after your visit with us. Thank you!             Your Updated Medication List - Protect others around you: Learn how to safely use, store and throw away your medicines at www.disposemymeds.org.          This list is accurate as of: 4/6/17  3:28 PM.  Always use your most recent med list.                   Brand Name Dispense Instructions for use    albuterol 108 (90 BASE) MCG/ACT Inhaler    PROAIR HFA/PROVENTIL HFA/VENTOLIN HFA    1 Inhaler    Inhale 2 puffs into the lungs every 6 hours as needed for shortness of breath / dyspnea or wheezing       ASPIRIN NOT PRESCRIBED    INTENTIONAL    0 each    1 each continuous prn for other Antiplatelet medication not prescribed intentionally due to GI Issues / Ulcer Disease       azithromycin 250 MG tablet    ZITHROMAX    6 tablet    Two tablets first day, then one tablet daily for four days.        glipiZIDE 5 MG 24 hr tablet    glipiZIDE XL    90 tablet    Take 1 tablet (5 mg) by mouth daily       guaiFENesin-codeine 100-10 MG/5ML Soln solution    ROBITUSSIN AC    40 mL    Take 5-10 mLs by mouth nightly as needed for cough       lisinopril 10 MG tablet    PRINIVIL/ZESTRIL    90 tablet    Take 1 tablet (10 mg) by mouth every morning       lovastatin 40 MG tablet    MEVACOR    90 tablet    Take 1 tablet (40 mg) by mouth At Bedtime       metFORMIN 1000 MG tablet    GLUCOPHAGE    180 tablet    Take 1 tablet (1,000 mg) by mouth 2 times daily (with meals) with breakfast and dinner.       omeprazole 40 MG capsule    priLOSEC    90 capsule    Take 1 capsule (40 mg) by mouth daily Take 30 minutes before a meal.

## 2017-04-08 LAB
BACTERIA SPEC CULT: NORMAL
MICRO REPORT STATUS: NORMAL
SPECIMEN SOURCE: NORMAL

## 2017-05-09 DIAGNOSIS — R10.84 ABDOMINAL PAIN, GENERALIZED: ICD-10-CM

## 2017-05-09 DIAGNOSIS — C16.9 GASTRIC CANCER (H): ICD-10-CM

## 2017-05-09 DIAGNOSIS — I10 ESSENTIAL HYPERTENSION: ICD-10-CM

## 2017-05-09 DIAGNOSIS — E11.9 TYPE 2 DIABETES MELLITUS WITHOUT COMPLICATION (H): ICD-10-CM

## 2017-05-09 DIAGNOSIS — E78.5 HYPERLIPIDEMIA LDL GOAL <100: ICD-10-CM

## 2017-05-11 RX ORDER — OMEPRAZOLE 40 MG/1
CAPSULE, DELAYED RELEASE ORAL
Qty: 90 CAPSULE | Refills: 1 | Status: SHIPPED | OUTPATIENT
Start: 2017-05-11 | End: 2017-07-28

## 2017-05-11 RX ORDER — LOVASTATIN 40 MG
TABLET ORAL
Qty: 90 TABLET | Refills: 1 | Status: SHIPPED | OUTPATIENT
Start: 2017-05-11 | End: 2017-07-28

## 2017-05-11 RX ORDER — GLIPIZIDE 5 MG/1
TABLET, FILM COATED, EXTENDED RELEASE ORAL
Qty: 60 TABLET | Refills: 0 | Status: SHIPPED | OUTPATIENT
Start: 2017-05-11 | End: 2017-07-21

## 2017-05-11 RX ORDER — LISINOPRIL 10 MG/1
TABLET ORAL
Qty: 90 TABLET | Refills: 1 | Status: SHIPPED | OUTPATIENT
Start: 2017-05-11 | End: 2017-07-28

## 2017-05-11 NOTE — TELEPHONE ENCOUNTER
GLIPIZIDE 10MG         Last Written Prescription Date: 5/13/2016  Last Fill Quantity: 90, # refills: 3  Last Office Visit with [facility], [facility] or  Health prescribing provider:  1/18/2017        BP Readings from Last 3 Encounters:   04/06/17 92/60   01/18/17 110/70   11/03/16 108/72     Lab Results   Component Value Date    MICROL 11 01/18/2017     Lab Results   Component Value Date    UMALCR 30.19 01/18/2017     Creatinine   Date Value Ref Range Status   01/18/2017 1.06 0.66 - 1.25 mg/dL Final   ]  GFR Estimate   Date Value Ref Range Status   01/18/2017 71 >60 mL/min/1.7m2 Final     Comment:     Non  GFR Calc   05/13/2016 79 >60 mL/min/1.7m2 Final     Comment:     Non  GFR Calc   08/06/2015 >60 ml/min/1.73m2 Final     GFR Estimate If Black   Date Value Ref Range Status   01/18/2017 86 >60 mL/min/1.7m2 Final     Comment:      GFR Calc   05/13/2016 >90   GFR Calc   >60 mL/min/1.7m2 Final   08/06/2015 >60 ml/min/1.73m2 Final     Lab Results   Component Value Date    CHOL 171 01/18/2017     Lab Results   Component Value Date    HDL 53 01/18/2017     Lab Results   Component Value Date    LDL 93 01/18/2017     Lab Results   Component Value Date    TRIG 126 01/18/2017     Lab Results   Component Value Date    CHOLHDLRATIO 3.6 03/13/2015     Lab Results   Component Value Date    AST 13 01/18/2017     Lab Results   Component Value Date    ALT 19 01/18/2017     Lab Results   Component Value Date    A1C 6.9 01/18/2017    A1C 6.5 05/13/2016    A1C 6.4 09/09/2015    A1C 6.9 03/13/2015    A1C 6.2 06/02/2014     Potassium   Date Value Ref Range Status   01/18/2017 4.3 3.4 - 5.3 mmol/L Final         LOVASTATIN 40MG     Last Written Prescription Date: 5/13/2016  Last Fill Quantity: 90, # refills: 3  Last Office Visit with [facility], [facility] or  Health prescribing provider: 1/18/2017       Lab Results   Component Value Date    CHOL 171 01/18/2017     Lab Results   Component Value Date     HDL 53 01/18/2017     Lab Results   Component Value Date    LDL 93 01/18/2017     Lab Results   Component Value Date    TRIG 126 01/18/2017     Lab Results   Component Value Date    CHOLHDLRATIO 3.6 03/13/2015           METFORMIN 1000MG         Last Written Prescription Date: 5/13/2016  Last Fill Quantity: 180, # refills: 3  Last Office Visit with [facility], [facility] or [facility] prescribing provider:  1/18/2017        BP Readings from Last 3 Encounters:   04/06/17 92/60   01/18/17 110/70   11/03/16 108/72     Lab Results   Component Value Date    MICROL 11 01/18/2017     Lab Results   Component Value Date    UMALCR 30.19 01/18/2017     Creatinine   Date Value Ref Range Status   01/18/2017 1.06 0.66 - 1.25 mg/dL Final   ]  GFR Estimate   Date Value Ref Range Status   01/18/2017 71 >60 mL/min/1.7m2 Final     Comment:     Non  GFR Calc   05/13/2016 79 >60 mL/min/1.7m2 Final     Comment:     Non  GFR Calc   08/06/2015 >60 ml/min/1.73m2 Final     GFR Estimate If Black   Date Value Ref Range Status   01/18/2017 86 >60 mL/min/1.7m2 Final     Comment:      GFR Calc   05/13/2016 >90   GFR Calc   >60 mL/min/1.7m2 Final   08/06/2015 >60 ml/min/1.73m2 Final     Lab Results   Component Value Date    CHOL 171 01/18/2017     Lab Results   Component Value Date    HDL 53 01/18/2017     Lab Results   Component Value Date    LDL 93 01/18/2017     Lab Results   Component Value Date    TRIG 126 01/18/2017     Lab Results   Component Value Date    CHOLHDLRATIO 3.6 03/13/2015     Lab Results   Component Value Date    AST 13 01/18/2017     Lab Results   Component Value Date    ALT 19 01/18/2017     Lab Results   Component Value Date    A1C 6.9 01/18/2017    A1C 6.5 05/13/2016    A1C 6.4 09/09/2015    A1C 6.9 03/13/2015    A1C 6.2 06/02/2014     Potassium   Date Value Ref Range Status   01/18/2017 4.3 3.4 - 5.3 mmol/L Final       OMEPRAZOLE  40MG  Last Written Prescription Date:  5/13/2016  Last Fill Quantity: 90,  # refills: 3   Last Office Visit with [facility], [facility] or [facility] prescribing provider: 1/18/2017      LISINOPRIL 10MG      Last Written Prescription Date: 5/13/2016  Last Fill Quantity: 90, # refills: 3  Last Office Visit with [facility], [facility] or [facility] prescribing provider: 1/18/2017       Potassium   Date Value Ref Range Status   01/18/2017 4.3 3.4 - 5.3 mmol/L Final     Creatinine   Date Value Ref Range Status   01/18/2017 1.06 0.66 - 1.25 mg/dL Final     BP Readings from Last 3 Encounters:   04/06/17 92/60   01/18/17 110/70   11/03/16 108/72

## 2017-05-11 NOTE — TELEPHONE ENCOUNTER
Metformin and Glipizide- Medication is being filled for 1 time refill only due to:  Patient needs to be seen because due for DM f/u in July per last OV note.     Omeprazole  Lisinopril  Mevacor  Prescription approved per [facility] Refill Protocol.    Antonella Guaman, RN, BSN, PHN

## 2017-07-21 DIAGNOSIS — E11.9 TYPE 2 DIABETES MELLITUS WITHOUT COMPLICATION (H): ICD-10-CM

## 2017-07-24 RX ORDER — GLIPIZIDE 5 MG/1
TABLET, FILM COATED, EXTENDED RELEASE ORAL
Qty: 30 TABLET | Refills: 0 | Status: SHIPPED | OUTPATIENT
Start: 2017-07-24 | End: 2017-07-28

## 2017-07-24 NOTE — TELEPHONE ENCOUNTER
glipiZIDE (GLUCOTROL XL) 5 MG 24 hr tablet         Last Written Prescription Date: 5/11/2017  Last Fill Quantity: 60, # refills: 0  Last Office Visit with [facility], [facility] or [facility] prescribing provider:  4/6/2017   Next 5 appointments (look out 90 days)     Jul 28, 2017 10:20 AM CDT   Office Visit with Mazin Ford MD   [facility]    [address] [place] [id number] [phone]                   BP Readings from Last 3 Encounters:   04/06/17 92/60   01/18/17 110/70   11/03/16 108/72     Lab Results   Component Value Date    MICROL 11 01/18/2017     Lab Results   Component Value Date    UMALCR 30.19 01/18/2017     Creatinine   Date Value Ref Range Status   01/18/2017 1.06 0.66 - 1.25 mg/dL Final   ]  GFR Estimate   Date Value Ref Range Status   01/18/2017 71 >60 mL/min/1.7m2 Final     Comment:     Non  GFR Calc   05/13/2016 79 >60 mL/min/1.7m2 Final     Comment:     Non  GFR Calc   08/06/2015 >60 ml/min/1.73m2 Final     GFR Estimate If Black   Date Value Ref Range Status   01/18/2017 86 >60 mL/min/1.7m2 Final     Comment:      GFR Calc   05/13/2016 >90   GFR Calc   >60 mL/min/1.7m2 Final   08/06/2015 >60 ml/min/1.73m2 Final     Lab Results   Component Value Date    CHOL 171 01/18/2017     Lab Results   Component Value Date    HDL 53 01/18/2017     Lab Results   Component Value Date    LDL 93 01/18/2017     Lab Results   Component Value Date    TRIG 126 01/18/2017     Lab Results   Component Value Date    CHOLHDLRATIO 3.6 03/13/2015     Lab Results   Component Value Date    AST 13 01/18/2017     Lab Results   Component Value Date    ALT 19 01/18/2017     Lab Results   Component Value Date    A1C 6.9 01/18/2017    A1C 6.5 05/13/2016    A1C 6.4 09/09/2015    A1C 6.9 03/13/2015    A1C 6.2 06/02/2014     Potassium   Date Value Ref Range Status   01/18/2017 4.3 3.4 - 5.3 mmol/L Final

## 2017-07-24 NOTE — TELEPHONE ENCOUNTER
Patient received a one-time fill last time needing appointment. She does have visit scheduled this week, 7/28/17.    Bindu Correa, MSN, RN-BC  Care Coordinator

## 2017-07-28 ENCOUNTER — OFFICE VISIT (OUTPATIENT)
Dept: PEDIATRICS | Facility: CLINIC | Age: 62
End: 2017-07-28
Payer: COMMERCIAL

## 2017-07-28 VITALS
DIASTOLIC BLOOD PRESSURE: 76 MMHG | WEIGHT: 135 LBS | BODY MASS INDEX: 24.84 KG/M2 | SYSTOLIC BLOOD PRESSURE: 102 MMHG | HEART RATE: 63 BPM | OXYGEN SATURATION: 96 % | HEIGHT: 62 IN | TEMPERATURE: 98 F

## 2017-07-28 DIAGNOSIS — E78.5 HYPERLIPIDEMIA LDL GOAL <100: ICD-10-CM

## 2017-07-28 DIAGNOSIS — C16.9 MALIGNANT NEOPLASM OF STOMACH, UNSPECIFIED LOCATION (H): ICD-10-CM

## 2017-07-28 DIAGNOSIS — E11.9 TYPE 2 DIABETES MELLITUS WITHOUT COMPLICATION, WITHOUT LONG-TERM CURRENT USE OF INSULIN (H): Primary | ICD-10-CM

## 2017-07-28 DIAGNOSIS — R10.84 ABDOMINAL PAIN, GENERALIZED: ICD-10-CM

## 2017-07-28 DIAGNOSIS — I10 ESSENTIAL HYPERTENSION: ICD-10-CM

## 2017-07-28 LAB — HBA1C MFR BLD: 6.2 % (ref 4.3–6)

## 2017-07-28 PROCEDURE — 36415 COLL VENOUS BLD VENIPUNCTURE: CPT | Performed by: INTERNAL MEDICINE

## 2017-07-28 PROCEDURE — 99214 OFFICE O/P EST MOD 30 MIN: CPT | Performed by: INTERNAL MEDICINE

## 2017-07-28 PROCEDURE — 80048 BASIC METABOLIC PNL TOTAL CA: CPT | Performed by: INTERNAL MEDICINE

## 2017-07-28 PROCEDURE — 83036 HEMOGLOBIN GLYCOSYLATED A1C: CPT | Performed by: INTERNAL MEDICINE

## 2017-07-28 RX ORDER — LOVASTATIN 40 MG
TABLET ORAL
Qty: 90 TABLET | Refills: 3 | Status: SHIPPED | OUTPATIENT
Start: 2017-07-28 | End: 2018-08-17

## 2017-07-28 RX ORDER — OMEPRAZOLE 40 MG/1
CAPSULE, DELAYED RELEASE ORAL
Qty: 90 CAPSULE | Refills: 3 | Status: SHIPPED | OUTPATIENT
Start: 2017-07-28 | End: 2018-08-17

## 2017-07-28 RX ORDER — GLIPIZIDE 5 MG/1
5 TABLET, FILM COATED, EXTENDED RELEASE ORAL DAILY
Qty: 90 TABLET | Refills: 3 | Status: SHIPPED | OUTPATIENT
Start: 2017-07-28 | End: 2018-08-17

## 2017-07-28 RX ORDER — LISINOPRIL 10 MG/1
10 TABLET ORAL EVERY MORNING
Qty: 90 TABLET | Refills: 3 | Status: SHIPPED | OUTPATIENT
Start: 2017-07-28 | End: 2018-08-17

## 2017-07-28 NOTE — PATIENT INSTRUCTIONS
MY DIABETES TODAY:    1)  Goal A1C is under 7%  Mine is:      Lab Results   Component Value Date    A1C 6.9 01/18/2017       2)  Goal LDL (bad cholesterol) under 100  (measured at least yearly)- I am currently at:   Lab Results   Component Value Date    LDL 93 01/18/2017       3)  Goal blood pressure under 130/80- mine was 102/76 today    4)  No tobacco use      ACTION PLAN CHANGES FROM TODAY:    Care Plan changes:    1) No medication changes today    Labs:     I will fast (nothing to eat or drink except water with medications) 12 hours before my lab appointment.    Follow up:    I will follow up with my physician:  In six months.

## 2017-07-28 NOTE — LETTER
[address] [address]                  [phone]   July 31, 2017    Nader Alfaro  [address] [address] [facility] [address]    Nader:     Your tests are all complete. The results show:     1. Your metabolic panel, including kidney function, glucose (blood sugar) and electrolytes, is within expected limits.     2. Your hemoglobin A1C (long-range glucose control) shows excellent diabetes control. An optimal A1C is <7%.       Please feel free to contact me if you have any questions or concerns.       Mazin Ford MD       Results for orders placed or performed in visit on 07/28/17   Hemoglobin A1c   Result Value Ref Range    Hemoglobin A1C 6.2 (H) 4.3 - 6.0 %   Basic metabolic panel   Result Value Ref Range    Sodium 138 133 - 144 mmol/L    Potassium 4.3 3.4 - 5.3 mmol/L    Chloride 106 94 - 109 mmol/L    Carbon Dioxide 29 20 - 32 mmol/L    Anion Gap 3 3 - 14 mmol/L    Glucose 123 (H) 70 - 99 mg/dL    Urea Nitrogen 18 7 - 30 mg/dL    Creatinine 1.07 0.66 - 1.25 mg/dL    GFR Estimate 70 >60 mL/min/1.7m2    GFR Estimate If Black 85 >60 mL/min/1.7m2    Calcium 8.5 8.5 - 10.1 mg/dL

## 2017-07-28 NOTE — PROGRESS NOTES
"  SUBJECTIVE:                                                    Nader Alfaro is a 62 year old male who presents to clinic today for the following health issues:      Diabetes Follow-up      Patient is checking blood sugars: rarely.  Results range from 115 to 120    Diabetic concerns: None     Symptoms of hypoglycemia (low blood sugar): intermittent if does not eat for a few hours     Paresthesias (numbness or burning in feet) or sores: No     Date of last diabetic eye exam: 2-3 years ago.     Hx of gastric cancer.  Surgically treated.  Has ongoing issues related to his surgery, specifically difficulty with eating.   Cannot eat large amounts at once.  Frequently becomes hungry between meals.     Reviewed weight:  Wt Readings from Last 4 Encounters:   07/28/17 135 lb (61.2 kg)   04/06/17 135 lb 9.6 oz (61.5 kg)   01/18/17 137 lb (62.1 kg)   05/13/16 149 lb (67.6 kg)     Hyperlipidemia. Overall doing well.   Lab Results   Component Value Date    LDL 93 01/18/2017     HTN. No cardiac sx such as CP, palpitations, PND, orthopnea, VILLALOBOS or peripheral edema.    Abdominal pain, potentially related to gastric cancer hx.   Taking PPI, does seem to help his sx.       Amount of exercise or physical activity: 1 day/week for an average of 15-30 minutes    Problems taking medications regularly: No    Medication side effects: none    Diet: diabetic      Problem list and histories reviewed & adjusted, as indicated.    Labs reviewed in EPIC    Reviewed and updated as needed this visit by clinical staffTobacco  Allergies  Meds  Med Hx  Surg Hx  Fam Hx  Soc Hx      Reviewed and updated as needed this visit by Provider  Tobacco  Allergies  Meds  Problems  Med Hx  Surg Hx  Fam Hx  Soc Hx          ROS:  Constitutional, HEENT, cardiovascular, pulmonary, gi and gu systems are negative, except as otherwise noted.      OBJECTIVE:   /76 (Cuff Size: Adult Regular)  Pulse 63  Temp 98  F (36.7  C) (Oral)  Ht 5' 2.25\" (1.581 m)  " Wt 135 lb (61.2 kg)  SpO2 96%  BMI 24.49 kg/m2  Body mass index is 24.49 kg/(m^2).  GENERAL: healthy, alert and no distress  EYES: Eyes grossly normal to inspection, PERRL and conjunctivae and sclerae normal  HENT: ear canals and TM's normal, nose and mouth without ulcers or lesions  NECK: no adenopathy, no asymmetry, masses, or scars and thyroid normal to palpation  RESP: lungs clear to auscultation - no rales, rhonchi or wheezes  CV: regular rate and rhythm, normal S1 S2, no S3 or S4, no murmur, click or rub, no peripheral edema and peripheral pulses strong  MS: no gross musculoskeletal defects noted, no edema  SKIN: no suspicious lesions or rashes  PSYCH: mentation appears normal, affect normal/bright  Diabetic foot exam: normal DP and PT pulses, no trophic changes or ulcerative lesions and normal sensory exam      ASSESSMENT/PLAN:       ICD-10-CM    1. Type 2 diabetes mellitus without complication, without long-term current use of insulin (H) E11.9 Hemoglobin A1c     Basic metabolic panel     glipiZIDE (GLUCOTROL XL) 5 MG 24 hr tablet     metFORMIN (GLUCOPHAGE) 1000 MG tablet   2. Hyperlipidemia LDL goal <100 E78.5 lovastatin (MEVACOR) 40 MG tablet   3. Malignant neoplasm of stomach, unspecified location (H) C16.9 omeprazole (PRILOSEC) 40 MG capsule   4. Abdominal pain, generalized R10.84 omeprazole (PRILOSEC) 40 MG capsule   5. Hypertension I10 lisinopril (PRINIVIL/ZESTRIL) 10 MG tablet     Patient Instructions     MY DIABETES TODAY:    1)  Goal A1C is under 7%  Mine is:      Lab Results   Component Value Date    A1C 6.9 01/18/2017       2)  Goal LDL (bad cholesterol) under 100  (measured at least yearly)- I am currently at:   Lab Results   Component Value Date    LDL 93 01/18/2017       3)  Goal blood pressure under 130/80- mine was 102/76 today    4)  No tobacco use      ACTION PLAN CHANGES FROM TODAY:    Care Plan changes:    1) No medication changes today    Labs:     I will fast (nothing to eat or drink  except water with medications) 12 hours before my lab appointment.    Follow up:    I will follow up with my physician:  In six months.      Mazin Ford MD  [facility]

## 2017-07-28 NOTE — NURSING NOTE
"Chief Complaint   Patient presents with     Diabetes       Initial /76 (Cuff Size: Adult Regular)  Pulse 63  Temp 98  F (36.7  C) (Oral)  Ht 5' 2.25\" (1.581 m)  Wt 135 lb (61.2 kg)  SpO2 96%  BMI 24.49 kg/m2 Estimated body mass index is 24.49 kg/(m^2) as calculated from the following:    Height as of this encounter: 5' 2.25\" (1.581 m).    Weight as of this encounter: 135 lb (61.2 kg).  Medication Reconciliation: complete    Angeles Hensley   "

## 2017-07-29 LAB
ANION GAP SERPL CALCULATED.3IONS-SCNC: 3 MMOL/L (ref 3–14)
BUN SERPL-MCNC: 18 MG/DL (ref 7–30)
CALCIUM SERPL-MCNC: 8.5 MG/DL (ref 8.5–10.1)
CHLORIDE SERPL-SCNC: 106 MMOL/L (ref 94–109)
CO2 SERPL-SCNC: 29 MMOL/L (ref 20–32)
CREAT SERPL-MCNC: 1.07 MG/DL (ref 0.66–1.25)
GFR SERPL CREATININE-BSD FRML MDRD: 70 ML/MIN/1.7M2
GLUCOSE SERPL-MCNC: 123 MG/DL (ref 70–99)
POTASSIUM SERPL-SCNC: 4.3 MMOL/L (ref 3.4–5.3)
SODIUM SERPL-SCNC: 138 MMOL/L (ref 133–144)

## 2018-01-29 ENCOUNTER — OFFICE VISIT (OUTPATIENT)
Dept: PEDIATRICS | Facility: CLINIC | Age: 63
End: 2018-01-29
Payer: COMMERCIAL

## 2018-01-29 VITALS
HEART RATE: 64 BPM | SYSTOLIC BLOOD PRESSURE: 128 MMHG | WEIGHT: 137 LBS | BODY MASS INDEX: 24.86 KG/M2 | TEMPERATURE: 98 F | OXYGEN SATURATION: 96 % | DIASTOLIC BLOOD PRESSURE: 88 MMHG

## 2018-01-29 DIAGNOSIS — E78.5 HYPERLIPIDEMIA LDL GOAL <100: ICD-10-CM

## 2018-01-29 DIAGNOSIS — E11.9 TYPE 2 DIABETES MELLITUS WITHOUT COMPLICATION, WITHOUT LONG-TERM CURRENT USE OF INSULIN (H): Primary | ICD-10-CM

## 2018-01-29 DIAGNOSIS — I10 ESSENTIAL HYPERTENSION: ICD-10-CM

## 2018-01-29 LAB — HBA1C MFR BLD: 6.2 % (ref 4.3–6)

## 2018-01-29 PROCEDURE — 36415 COLL VENOUS BLD VENIPUNCTURE: CPT | Performed by: INTERNAL MEDICINE

## 2018-01-29 PROCEDURE — 80053 COMPREHEN METABOLIC PANEL: CPT | Performed by: INTERNAL MEDICINE

## 2018-01-29 PROCEDURE — 82043 UR ALBUMIN QUANTITATIVE: CPT | Performed by: INTERNAL MEDICINE

## 2018-01-29 PROCEDURE — 99214 OFFICE O/P EST MOD 30 MIN: CPT | Performed by: INTERNAL MEDICINE

## 2018-01-29 PROCEDURE — 80061 LIPID PANEL: CPT | Performed by: INTERNAL MEDICINE

## 2018-01-29 PROCEDURE — 84443 ASSAY THYROID STIM HORMONE: CPT | Performed by: INTERNAL MEDICINE

## 2018-01-29 PROCEDURE — 83036 HEMOGLOBIN GLYCOSYLATED A1C: CPT | Performed by: INTERNAL MEDICINE

## 2018-01-29 NOTE — LETTER
[address] [address]                  [phone]   January 30, 2018    Nader Oterodoyle  [address] [facility] [address]    Nader:     Your tests are all complete. The results show:     1. Your cholesterol levels look great!     Your Lipid Goals:   Cholesterol: Desirable is less than 200, Borderline is 200-240   HDL (Good Cholesterol): Desirable is greater than 40   LDL (Bad Cholesterol): Desirable is less than 100, Borderline 100-130   Triglycerides (Fats in the Blood): Desirable is less than 150,  Borderline is 150-200     2. Your TSH (thyroid function) is normal.     3. Your metabolic panel, including liver and kidney function, glucose (blood sugar) and electrolytes, is within expected limits.     4. Your hemoglobin A1C (long-range glucose control) shows good control of your diabetes. An optimal A1C is <7%.     5. Your microalbumin level (check for protein in the urine) is mildly elevated but is not currently worrisome. This should be checked once per year to ensure that your diabetes is not adversely affecting your kidney function.       Please feel free to contact me if you have any questions or concerns.       Mazin Ford MD         Results for orders placed or performed in visit on 01/29/18   Comprehensive metabolic panel   Result Value Ref Range    Sodium 136 133 - 144 mmol/L    Potassium 4.2 3.4 - 5.3 mmol/L    Chloride 101 94 - 109 mmol/L    Carbon Dioxide 30 20 - 32 mmol/L    Anion Gap 5 3 - 14 mmol/L    Glucose 129 (H) 70 - 99 mg/dL    Urea Nitrogen 21 7 - 30 mg/dL    Creatinine 1.03 0.66 - 1.25 mg/dL    GFR Estimate 74 >60 mL/min/1.7m2    GFR Estimate If Black 89 >60 mL/min/1.7m2    Calcium 9.0 8.5 - 10.1 mg/dL    Bilirubin Total 0.6 0.2 - 1.3 mg/dL    Albumin 3.9 3.4 - 5.0 g/dL    Protein Total 7.3 6.8 - 8.8 g/dL    Alkaline Phosphatase 69 40 - 150 U/L    ALT 26 0 - 70 U/L    AST 25 0 - 45 U/L   Hemoglobin A1c   Result Value  Ref Range    Hemoglobin A1C 6.2 (H) 4.3 - 6.0 %   Albumin Random Urine Quantitative with Creat Ratio   Result Value Ref Range    Creatinine Urine 30 mg/dL    Albumin Urine mg/L 35 mg/L    Albumin Urine mg/g Cr 114.14 (H) 0 - 17 mg/g Cr   Lipid panel reflex to direct LDL Fasting   Result Value Ref Range    Cholesterol 166 <200 mg/dL    Triglycerides 74 <150 mg/dL    HDL Cholesterol 62 >39 mg/dL    LDL Cholesterol Calculated 89 <100 mg/dL    Non HDL Cholesterol 104 <130 mg/dL   TSH with free T4 reflex   Result Value Ref Range    TSH 2.20 0.40 - 4.00 mU/L

## 2018-01-29 NOTE — PROGRESS NOTES
SUBJECTIVE:   Nader Alfaro is a 62 year old male who presents to clinic today for the following health issues:      Diabetes Follow-up    Patient is checking blood sugars: twice daily 2-3 times per week.    Results are as follows:         am - 178              postprandial after breakfast - not sure    Diabetic concerns: None     Symptoms of hypoglycemia (low blood sugar): none     Paresthesias (numbness or burning in feet) or sores: Yes not in feet but in hands.      Date of last diabetic eye exam: unknown    Hyperlipidemia Follow-Up      Rate your low fat/cholesterol diet?: good    Taking statin?  Yes, no muscle aches from statin    Other lipid medications/supplements?:  none    Hypertension Follow-up      Outpatient blood pressures are being checked at home.  Results are 120/80.    Low Salt Diet: low salt    BP Readings from Last 2 Encounters:   01/29/18 128/88   07/28/17 102/76     Hemoglobin A1C (%)   Date Value   07/28/2017 6.2 (H)   01/18/2017 6.9 (H)     LDL Cholesterol Calculated (mg/dL)   Date Value   01/18/2017 93   05/13/2016 85       Amount of exercise or physical activity: 1 day/week for an average of 30-45 minutes    Problems taking medications regularly: No    Medication side effects: none    Diet: diabetic          Problem list and histories reviewed & adjusted, as indicated.    Labs reviewed in EPIC    Reviewed and updated as needed this visit by clinical staff  Tobacco  Meds  Problems  Med Hx  Surg Hx  Fam Hx  Soc Hx      Reviewed and updated as needed this visit by Provider  Tobacco  Allergies  Meds  Problems  Med Hx  Surg Hx  Fam Hx  Soc Hx          ROS:  Constitutional, HEENT, cardiovascular, pulmonary, gi and gu systems are negative, except as otherwise noted.    OBJECTIVE:     /88 (Cuff Size: Adult Regular)  Pulse 64  Temp 98  F (36.7  C) (Oral)  Wt 137 lb (62.1 kg)  SpO2 96%  BMI 24.86 kg/m2  Body mass index is 24.86 kg/(m^2).  GENERAL: healthy, alert and no  distress  EYES: Eyes grossly normal to inspection, PERRL and conjunctivae and sclerae normal  HENT: ear canals and TM's normal, nose and mouth without ulcers or lesions  NECK: no adenopathy, no asymmetry, masses, or scars and thyroid normal to palpation  RESP: lungs clear to auscultation - no rales, rhonchi or wheezes  CV: regular rate and rhythm, normal S1 S2, no S3 or S4, no murmur, click or rub, no peripheral edema and peripheral pulses strong  ABDOMEN: soft, nontender, no hepatosplenomegaly, no masses and bowel sounds normal  MS: no gross musculoskeletal defects noted, no edema  SKIN: no suspicious lesions or rashes  NEURO: Normal strength and tone, mentation intact and speech normal  PSYCH: mentation appears normal, affect normal/bright  Diabetic foot exam: normal DP and PT pulses, no trophic changes or ulcerative lesions and normal sensory exam    Lab Results   Component Value Date    A1C 6.2 01/29/2018          ASSESSMENT/PLAN:       ICD-10-CM    1. Type 2 diabetes mellitus without complication, without long-term current use of insulin (H) E11.9 Comprehensive metabolic panel     Hemoglobin A1c     Albumin Random Urine Quantitative with Creat Ratio     Lipid panel reflex to direct LDL Fasting     TSH with free T4 reflex   2. Hypertension I10 Comprehensive metabolic panel   3. Hyperlipidemia LDL goal <100 E78.5 Comprehensive metabolic panel     Lipid panel reflex to direct LDL Fasting     Continue w/ current medications.  Plan follow-up in 3-6 months, sooner if other needs arise.       Mazin Ford MD  [facility]

## 2018-01-29 NOTE — NURSING NOTE
"Chief Complaint   Patient presents with     Diabetes       Initial /88 (Cuff Size: Adult Regular)  Pulse 64  Temp 98  F (36.7  C) (Oral)  Wt 137 lb (62.1 kg)  SpO2 96%  BMI 24.86 kg/m2 Estimated body mass index is 24.86 kg/(m^2) as calculated from the following:    Height as of 7/28/17: 5' 2.25\" (1.581 m).    Weight as of this encounter: 137 lb (62.1 kg).  Medication Reconciliation: joy Hensley   "

## 2018-01-30 LAB
ALBUMIN SERPL-MCNC: 3.9 G/DL (ref 3.4–5)
ALP SERPL-CCNC: 69 U/L (ref 40–150)
ALT SERPL W P-5'-P-CCNC: 26 U/L (ref 0–70)
ANION GAP SERPL CALCULATED.3IONS-SCNC: 5 MMOL/L (ref 3–14)
AST SERPL W P-5'-P-CCNC: 25 U/L (ref 0–45)
BILIRUB SERPL-MCNC: 0.6 MG/DL (ref 0.2–1.3)
BUN SERPL-MCNC: 21 MG/DL (ref 7–30)
CALCIUM SERPL-MCNC: 9 MG/DL (ref 8.5–10.1)
CHLORIDE SERPL-SCNC: 101 MMOL/L (ref 94–109)
CHOLEST SERPL-MCNC: 166 MG/DL
CO2 SERPL-SCNC: 30 MMOL/L (ref 20–32)
CREAT SERPL-MCNC: 1.03 MG/DL (ref 0.66–1.25)
CREAT UR-MCNC: 30 MG/DL
GFR SERPL CREATININE-BSD FRML MDRD: 74 ML/MIN/1.7M2
GLUCOSE SERPL-MCNC: 129 MG/DL (ref 70–99)
HDLC SERPL-MCNC: 62 MG/DL
LDLC SERPL CALC-MCNC: 89 MG/DL
MICROALBUMIN UR-MCNC: 35 MG/L
MICROALBUMIN/CREAT UR: 114.14 MG/G CR (ref 0–17)
NONHDLC SERPL-MCNC: 104 MG/DL
POTASSIUM SERPL-SCNC: 4.2 MMOL/L (ref 3.4–5.3)
PROT SERPL-MCNC: 7.3 G/DL (ref 6.8–8.8)
SODIUM SERPL-SCNC: 136 MMOL/L (ref 133–144)
TRIGL SERPL-MCNC: 74 MG/DL
TSH SERPL DL<=0.005 MIU/L-ACNC: 2.2 MU/L (ref 0.4–4)

## 2018-03-14 ENCOUNTER — TRANSFERRED RECORDS (OUTPATIENT)
Dept: SURGERY | Facility: CLINIC | Age: 63
End: 2018-03-14

## 2018-03-14 ENCOUNTER — TRANSFERRED RECORDS (OUTPATIENT)
Dept: HEALTH INFORMATION MANAGEMENT | Facility: CLINIC | Age: 63
End: 2018-03-14

## 2018-03-16 ENCOUNTER — TRANSFERRED RECORDS (OUTPATIENT)
Dept: HEALTH INFORMATION MANAGEMENT | Facility: CLINIC | Age: 63
End: 2018-03-16

## 2018-03-19 ENCOUNTER — TRANSFERRED RECORDS (OUTPATIENT)
Dept: HEALTH INFORMATION MANAGEMENT | Facility: CLINIC | Age: 63
End: 2018-03-19

## 2018-04-04 ENCOUNTER — TRANSFERRED RECORDS (OUTPATIENT)
Dept: HEALTH INFORMATION MANAGEMENT | Facility: CLINIC | Age: 63
End: 2018-04-04

## 2018-04-04 ENCOUNTER — TRANSFERRED RECORDS (OUTPATIENT)
Dept: SURGERY | Facility: CLINIC | Age: 63
End: 2018-04-04

## 2018-08-14 ENCOUNTER — DOCUMENTATION ONLY (OUTPATIENT)
Dept: LAB | Facility: CLINIC | Age: 63
End: 2018-08-14

## 2018-08-14 DIAGNOSIS — E11.9 TYPE 2 DIABETES MELLITUS WITHOUT COMPLICATION, WITHOUT LONG-TERM CURRENT USE OF INSULIN (H): Primary | ICD-10-CM

## 2018-08-14 NOTE — PROGRESS NOTES
has an upcoming lab only appointment 8/17. There are no open orders for this patient. Please place future orders and sign them.

## 2018-08-17 ENCOUNTER — OFFICE VISIT (OUTPATIENT)
Dept: PEDIATRICS | Facility: CLINIC | Age: 63
End: 2018-08-17
Payer: COMMERCIAL

## 2018-08-17 VITALS
DIASTOLIC BLOOD PRESSURE: 56 MMHG | WEIGHT: 141 LBS | OXYGEN SATURATION: 96 % | TEMPERATURE: 98.1 F | SYSTOLIC BLOOD PRESSURE: 98 MMHG | BODY MASS INDEX: 25.95 KG/M2 | HEART RATE: 72 BPM | RESPIRATION RATE: 14 BRPM | HEIGHT: 62 IN

## 2018-08-17 DIAGNOSIS — E11.9 TYPE 2 DIABETES MELLITUS WITHOUT COMPLICATION, WITHOUT LONG-TERM CURRENT USE OF INSULIN (H): ICD-10-CM

## 2018-08-17 DIAGNOSIS — I10 ESSENTIAL HYPERTENSION: ICD-10-CM

## 2018-08-17 DIAGNOSIS — E78.5 HYPERLIPIDEMIA LDL GOAL <100: ICD-10-CM

## 2018-08-17 DIAGNOSIS — C16.9 MALIGNANT NEOPLASM OF STOMACH, UNSPECIFIED LOCATION (H): ICD-10-CM

## 2018-08-17 DIAGNOSIS — R10.84 ABDOMINAL PAIN, GENERALIZED: ICD-10-CM

## 2018-08-17 LAB — HBA1C MFR BLD: 7.1 % (ref 0–5.6)

## 2018-08-17 PROCEDURE — 99214 OFFICE O/P EST MOD 30 MIN: CPT | Performed by: INTERNAL MEDICINE

## 2018-08-17 PROCEDURE — 36415 COLL VENOUS BLD VENIPUNCTURE: CPT | Performed by: INTERNAL MEDICINE

## 2018-08-17 PROCEDURE — 83036 HEMOGLOBIN GLYCOSYLATED A1C: CPT | Performed by: INTERNAL MEDICINE

## 2018-08-17 RX ORDER — OMEPRAZOLE 40 MG/1
CAPSULE, DELAYED RELEASE ORAL
Qty: 90 CAPSULE | Refills: 3 | Status: SHIPPED | OUTPATIENT
Start: 2018-08-17 | End: 2019-05-10

## 2018-08-17 RX ORDER — GLIPIZIDE 5 MG/1
5 TABLET, FILM COATED, EXTENDED RELEASE ORAL DAILY
Qty: 90 TABLET | Refills: 3 | Status: SHIPPED | OUTPATIENT
Start: 2018-08-17 | End: 2019-05-10

## 2018-08-17 RX ORDER — LISINOPRIL 10 MG/1
10 TABLET ORAL EVERY MORNING
Qty: 90 TABLET | Refills: 3 | Status: SHIPPED | OUTPATIENT
Start: 2018-08-17 | End: 2019-05-10 | Stop reason: DRUGHIGH

## 2018-08-17 RX ORDER — LOVASTATIN 40 MG
TABLET ORAL
Qty: 90 TABLET | Refills: 3 | Status: SHIPPED | OUTPATIENT
Start: 2018-08-17 | End: 2019-05-10

## 2018-08-17 NOTE — PATIENT INSTRUCTIONS
No medication changes today    Next visit in 3 months   Lab prior to the visit (2:20)   Does not need to be fasting

## 2018-08-17 NOTE — MR AVS SNAPSHOT
After Visit Summary   8/17/2018    Nader Alfaro    MRN: [id number]           Patient Information     Date Of Birth          1955        Visit Information        Provider Department      8/17/2018 2:00 PM Mazin Ford MD [facility]        Today's Diagnoses     Type 2 diabetes mellitus without complication, without long-term current use of insulin (H)        Hypertension        Hyperlipidemia LDL goal <100        Malignant neoplasm of stomach, unspecified location (H)        Abdominal pain, generalized          Care Instructions    No medication changes today    Next visit in 3 months   Lab prior to the visit (2:20)   Does not need to be fasting          Follow-ups after your visit        Your next 10 appointments already scheduled     Nov 16, 2018  2:30 PM CST   LAB with [facility]   [facility] ([facility])    [address]  [address]  [place] [id number] [phone]           Please do not eat 10-12 hours before your appointment if you are coming in fasting for labs on lipids, cholesterol, or glucose (sugar). This does not apply to pregnant women. Water, hot tea and black coffee (with nothing added) are okay. Do not drink other fluids, diet soda or chew gum.            Nov 16, 2018  3:00 PM CST   Office Visit with Mazin Ford MD   [facility] ([facility])    [address]  [address]  [place] [id number] [phone]           Bring a current list of meds and any records pertaining to this visit. For Physicals, please bring immunization records and any forms needing to be filled out. Please arrive 10 minutes early to complete paperwork.              Future tests that were ordered for you today     Open Future Orders        Priority Expected Expires Ordered    Hemoglobin A1c Routine  8/17/2019 8/17/2018            Who to contact     If you have questions or need follow up information about today's  "clinic visit or your schedule please contact [facility] directly at [phone].  Normal or non-critical lab and imaging results will be communicated to you by MyChart, letter or phone within 4 business days after the clinic has received the results. If you do not hear from us within 7 days, please contact the clinic through [employer] or phone. If you have a critical or abnormal lab result, we will notify you by phone as soon as possible.  Submit refill requests through [facility]Now or call your pharmacy and they will forward the refill request to us. Please allow 3 business days for your refill to be completed.          Additional Information About Your Visit        MyChart Information     LabNow lets you send messages to your doctor, view your test results, renew your prescriptions, schedule appointments and more. To sign up, go to [email or web address] . Click on \"Log in\" on the left side of the screen, which will take you to the Welcome page. Then click on \"Sign up Now\" on the right side of the page.     You will be asked to enter the access code listed below, as well as some personal information. Please follow the directions to create your username and password.     Your access code is: [email or web address]  Expires: 11/15/2018  2:52 PM     Your access code will  in 90 days. If you need help or a new code, please call your [place] clinic or [phone].        Care EveryWhere ID     This is your Care EveryWhere ID. This could be used by other organizations to access your [place] medical records  [id number]        Your Vitals Were     Pulse Temperature Respirations Height Pulse Oximetry BMI (Body Mass Index)    72 98.1  F (36.7  C) (Tympanic) 14 5' 2.25\" (1.581 m) 96% 25.58 kg/m2       Blood Pressure from Last 3 Encounters:   18 98/56   18 128/88   17 102/76    Weight from Last 3 Encounters:   18 141 lb (64 kg)   18 137 lb (62.1 kg)   17 135 lb (61.2 kg)            "      Today's Medication Changes          These changes are accurate as of 8/17/18  2:52 PM.  If you have any questions, ask your nurse or doctor.               Stop taking these medicines if you haven't already. Please contact your care team if you have questions.     albuterol 108 (90 Base) MCG/ACT inhaler   Commonly known as:  PROAIR HFA/PROVENTIL HFA/VENTOLIN HFA   Stopped by:  Mazin Ford MD                Where to get your medicines      These medications were sent to [facility] - [address]   [address] [place] [address] [address]     Phone:  [phone]     glipiZIDE 5 MG 24 hr tablet    lisinopril 10 MG tablet    lovastatin 40 MG tablet    metFORMIN 1000 MG tablet    omeprazole 40 MG capsule                Primary Care Provider Office Phone # Fax #    Mazin Ford [phone]       [address] [place] [address]        Equal Access to Services     [facility] AH: Hadii darrick ku hadasho Soomaali, waaxda luqadaha, qaybta kaalmada adeegyada, waxay idiin hayshantin wayne sebastian . So [place] -[phone].    ATENCIÓN: Si habla español, tiene a mcneil disposición servicios gratuitos de asistencia lingüística. Llame al [phone].    We comply with applicable federal civil rights laws and [place] laws. We do not discriminate on the basis of race, color, national origin, age, disability, sex, sexual orientation, or gender identity.            Thank you!     Thank you for choosing [facility]  for your care. Our goal is always to provide you with excellent care. Hearing back from our patients is one way we can continue to improve our services. Please take a few minutes to complete the written survey that you may receive in the mail after your visit with us. Thank you!             Your Updated Medication List - Protect others around you: Learn how to safely use, store and throw away your medicines at  www.disposemymeds.org.          This list is accurate as of 8/17/18  2:52 PM.  Always use your most recent med list.                   Brand Name Dispense Instructions for use Diagnosis    glipiZIDE 5 MG 24 hr tablet    GLUCOTROL XL    90 tablet    Take 1 tablet (5 mg) by mouth daily    Type 2 diabetes mellitus without complication, without long-term current use of insulin (H)       lisinopril 10 MG tablet    PRINIVIL/ZESTRIL    90 tablet    Take 1 tablet (10 mg) by mouth every morning    Essential hypertension       lovastatin 40 MG tablet    MEVACOR    90 tablet    TAKE ONE TABLET BY MOUTH AT BEDTIME    Hyperlipidemia LDL goal <100       metFORMIN 1000 MG tablet    GLUCOPHAGE    180 tablet    TAKE ONE TABLET BY MOUTH TWICE A DAY WITH MEALS (BREAKFAST AND DINNER)    Type 2 diabetes mellitus without complication, without long-term current use of insulin (H)       omeprazole 40 MG capsule    priLOSEC    90 capsule    TAKE ONE CAPSULE BY MOUTH EVERY DAY; TAKE 30 TO 60 MINUTES BEFORE A MEAL    Malignant neoplasm of stomach, unspecified location (H), Abdominal pain, generalized

## 2018-08-17 NOTE — PROGRESS NOTES
"  SUBJECTIVE:   Nader Alfaro is a 63 year old male who presents to clinic today for the following health issues:    Diabetes Follow-up  Patient is checking blood sugars: three times weekly.   Results are as follows: 120s is average, sometimes goes up to 150s after eating    Diabetic concerns: None     Symptoms of hypoglycemia (low blood sugar): none     Paresthesias (numbness or burning in feet) or sores: No     Date of last diabetic eye exam: unknown - needs to schedule    Lab drawn this am:  Lab Results   Component Value Date    A1C 7.1 08/17/2018      Hyperlipidemia Follow-Up    Rate your low fat/cholesterol diet?: good    Taking statin?  Yes, no muscle aches from statin    Other lipid medications/supplements?:  none    Hypertension Follow-up    Outpatient blood pressures are not being checked.    Low Salt Diet: no added salt    BP Readings from Last 2 Encounters:   01/29/18 128/88   07/28/17 102/76     Hemoglobin A1C (%)   Date Value   08/17/2018 7.1 (H)   01/29/2018 6.2 (H)     LDL Cholesterol Calculated (mg/dL)   Date Value   01/29/2018 89   01/18/2017 93       Amount of exercise or physical activity: None    Problems taking medications regularly: No    Medication side effects: none    Diet: diabetic    Hx of gastric cancer. Has been feeling well. No eating difficulties. Taking PPI.     Problem list and histories reviewed & adjusted, as indicated.    Labs reviewed in EPIC    Reviewed and updated as needed this visit by Provider  Tobacco  Allergies  Meds  Problems  Med Hx  Surg Hx  Fam Hx  Soc Hx          ROS:  Constitutional, HEENT, cardiovascular, pulmonary, gi and gu systems are negative, except as otherwise noted.    OBJECTIVE:     BP 98/56 (BP Location: Right arm, Patient Position: Chair, Cuff Size: Adult Regular)  Pulse 72  Temp 98.1  F (36.7  C) (Tympanic)  Resp 14  Ht 5' 2.25\" (1.581 m)  Wt 141 lb (64 kg)  SpO2 96%  BMI 25.58 kg/m2  Body mass index is 25.58 kg/(m^2).  GENERAL: healthy, alert " and no distress  EYES: Eyes grossly normal to inspection, PERRL and conjunctivae and sclerae normal  HENT: ear canals and TM's normal, nose and mouth without ulcers or lesions  NECK: no adenopathy, no asymmetry, masses, or scars and thyroid normal to palpation  RESP: lungs clear to auscultation - no rales, rhonchi or wheezes  CV: regular rate and rhythm, normal S1 S2, no murmur, no peripheral edema and peripheral pulses strong  ABDOMEN: soft, nontender, bowel sounds normal  MS: no gross musculoskeletal defects noted, no edema  SKIN: no suspicious lesions or rashes  NEURO: Normal strength and tone, mentation intact and speech normal  PSYCH: mentation appears normal, affect normal/bright  Diabetic foot exam: normal DP and PT pulses, no trophic changes or ulcerative lesions and normal sensory exam      ASSESSMENT/PLAN:       ICD-10-CM    1. Type 2 diabetes mellitus without complication, without long-term current use of insulin (H) E11.9 glipiZIDE (GLUCOTROL XL) 5 MG 24 hr tablet     metFORMIN (GLUCOPHAGE) 1000 MG tablet     Hemoglobin A1c   2. Hypertension I10 lisinopril (PRINIVIL/ZESTRIL) 10 MG tablet   3. Hyperlipidemia LDL goal <100 E78.5 lovastatin (MEVACOR) 40 MG tablet   4. Malignant neoplasm of stomach, unspecified location (H) C16.9 omeprazole (PRILOSEC) 40 MG capsule   5. Abdominal pain, generalized R10.84 omeprazole (PRILOSEC) 40 MG capsule     Patient Instructions   No medication changes today    Next visit in 3 months   Lab prior to the visit (2:20)   Does not need to be fasting    Mazin Ford MD  [facility]

## 2018-11-16 ENCOUNTER — OFFICE VISIT (OUTPATIENT)
Dept: PEDIATRICS | Facility: CLINIC | Age: 63
End: 2018-11-16
Payer: COMMERCIAL

## 2018-11-16 VITALS
SYSTOLIC BLOOD PRESSURE: 100 MMHG | OXYGEN SATURATION: 94 % | WEIGHT: 140 LBS | RESPIRATION RATE: 16 BRPM | HEART RATE: 66 BPM | BODY MASS INDEX: 25.4 KG/M2 | DIASTOLIC BLOOD PRESSURE: 60 MMHG | TEMPERATURE: 98.4 F

## 2018-11-16 DIAGNOSIS — C16.9 MALIGNANT NEOPLASM OF STOMACH, UNSPECIFIED LOCATION (H): ICD-10-CM

## 2018-11-16 DIAGNOSIS — E11.9 TYPE 2 DIABETES MELLITUS WITHOUT COMPLICATION, WITHOUT LONG-TERM CURRENT USE OF INSULIN (H): Primary | ICD-10-CM

## 2018-11-16 DIAGNOSIS — E11.9 TYPE 2 DIABETES MELLITUS WITHOUT COMPLICATION, WITHOUT LONG-TERM CURRENT USE OF INSULIN (H): ICD-10-CM

## 2018-11-16 DIAGNOSIS — Z23 NEED FOR PROPHYLACTIC VACCINATION AND INOCULATION AGAINST INFLUENZA: ICD-10-CM

## 2018-11-16 LAB — HBA1C MFR BLD: 7.1 % (ref 0–5.6)

## 2018-11-16 PROCEDURE — 90682 RIV4 VACC RECOMBINANT DNA IM: CPT | Performed by: INTERNAL MEDICINE

## 2018-11-16 PROCEDURE — 99214 OFFICE O/P EST MOD 30 MIN: CPT | Mod: 25 | Performed by: INTERNAL MEDICINE

## 2018-11-16 PROCEDURE — 36415 COLL VENOUS BLD VENIPUNCTURE: CPT | Performed by: INTERNAL MEDICINE

## 2018-11-16 PROCEDURE — G0008 ADMIN INFLUENZA VIRUS VAC: HCPCS | Performed by: INTERNAL MEDICINE

## 2018-11-16 PROCEDURE — 83036 HEMOGLOBIN GLYCOSYLATED A1C: CPT | Performed by: INTERNAL MEDICINE

## 2018-11-16 NOTE — PROGRESS NOTES
SUBJECTIVE:   Nader Alfaro is a 63 year old male who presents to clinic today for the following health issues:    Diabetes Follow-up    Patient is checking blood sugars: checks randomly at times, varies    Diabetic concerns: None     Symptoms of hypoglycemia (low blood sugar): none     Paresthesias (numbness or burning in feet) or sores: No     Date of last diabetic eye exam: is due    HTN. BP has been low. No significant LH sx.     BP Readings from Last 2 Encounters:   11/16/18 100/60   08/17/18 98/56     Hemoglobin A1C (%)   Date Value   11/16/2018 7.1 (H)   08/17/2018 7.1 (H)     LDL Cholesterol Calculated (mg/dL)   Date Value   01/29/2018 89   01/18/2017 93       Diabetes Management Resources    Amount of exercise or physical activity: None    Problems taking medications regularly: No    Medication side effects: none    Diet: diabetic    Hx of gastric cancer. S/p gastrectomy.  Overall is doing well.  Weight has been stable.  Wt Readings from Last 4 Encounters:   11/16/18 140 lb (63.5 kg)   08/17/18 141 lb (64 kg)   01/29/18 137 lb (62.1 kg)   07/28/17 135 lb (61.2 kg)     Notes erratic blood sugars around the time of eating.  Has to eat frequent small meals.  Likely has dumping syndrome - makes it difficult to do activities due to the frequency of need for calorie intake.   Is on disability d/t these sx. Symptoms have not improved, I feel he still requires disability.    Problem list and histories reviewed & adjusted, as indicated.    Labs reviewed in EPIC    Reviewed and updated as needed this visit by clinical staff  Tobacco  Allergies  Meds  Problems  Med Hx  Surg Hx  Fam Hx  Soc Hx        Reviewed and updated as needed this visit by Provider  Tobacco  Allergies  Meds  Problems  Med Hx  Surg Hx  Fam Hx  Soc Hx          ROS:  Constitutional, HEENT, cardiovascular, pulmonary, gi and gu systems are negative, except as otherwise noted.    OBJECTIVE:     /60 (BP Location: Right arm, Patient  Position: Chair, Cuff Size: Adult Regular)  Pulse 66  Temp 98.4  F (36.9  C) (Tympanic)  Resp 16  Wt 140 lb (63.5 kg)  SpO2 94%  BMI 25.4 kg/m2  Body mass index is 25.4 kg/(m^2).  GENERAL: healthy, alert and no distress  EYES: Eyes grossly normal to inspection, PERRL and conjunctivae and sclerae normal  HENT: ear canals and TM's normal, nose and mouth without ulcers or lesions  NECK: no adenopathy, no asymmetry, masses, or scars and thyroid normal to palpation  RESP: lungs clear to auscultation - no rales, rhonchi or wheezes  CV: regular rate and rhythm, normal S1 S2, no S3 or S4, no murmur, click or rub, no peripheral edema and peripheral pulses strong  ABDOMEN: soft, nontender, no hepatosplenomegaly, no masses and bowel sounds normal  MS: no gross musculoskeletal defects noted, no edema  SKIN: no suspicious lesions or rashes  NEURO: Normal strength and tone, mentation intact and speech normal  PSYCH: mentation appears normal, affect normal/bright  Diabetic foot exam: normal DP and PT pulses, no trophic changes or ulcerative lesions and normal sensory exam      ASSESSMENT/PLAN:       ICD-10-CM    1. Type 2 diabetes mellitus without complication, without long-term current use of insulin (H) E11.9    2. Malignant neoplasm of stomach, unspecified location (H) C16.9    3. Need for prophylactic vaccination and inoculation against influenza Z23 FLU VACCINE, (RIV4) RECOMBINANT HA  , IM (FluBlok, egg free) [98734]- >18 YRS (FMG recommended  50-64 YRS)     Patient Instructions   Continue with your current medications    Next visit in 6 months     Mazin Ford MD  [facility]    Injectable Influenza Immunization Documentation    1.  Is the person to be vaccinated sick today?   No    2. Does the person to be vaccinated have an allergy to a component   of the vaccine?   No  Egg Allergy Algorithm Link    3. Has the person to be vaccinated ever had a serious reaction   to influenza vaccine in the past?    No    4. Has the person to be vaccinated ever had Guillain-Barré syndrome?   No    Form completed by Mazin Ford MD

## 2018-11-16 NOTE — MR AVS SNAPSHOT
After Visit Summary   11/16/2018    Nader Alfaro    MRN: [id number]           Patient Information     Date Of Birth          1955        Visit Information        Provider Department      11/16/2018 2:45 PM Mazin Ford MD; [facility]        Today's Diagnoses     Need for prophylactic vaccination and inoculation against influenza    -  1      Care Instructions    Continue with your current medications    Next visit in 6 months           Follow-ups after your visit        Your next 10 appointments already scheduled     May 09, 2019  9:00 AM CDT   LAB with EA LAB   [facility]    [address] [place] [address]  [address]  [facility] [id number] [phone]           Please do not eat 10-12 hours before your appointment if you are coming in fasting for labs on lipids, cholesterol, or glucose (sugar). This does not apply to pregnant women. Water, hot tea and black coffee (with nothing added) are okay. Do not drink other fluids, diet soda or chew gum.            May 10, 2019  9:20 AM CDT   Office Visit with Mazin Ford MD   [facility]    [address] [place] [address]  [address]  [facility] [id number] [phone]           Bring a current list of meds and any records pertaining to this visit. For Physicals, please bring immunization records and any forms needing to be filled out. Please arrive 10 minutes early to complete paperwork.              Who to contact     If you have questions or need follow up information about today's clinic visit or your schedule please contact [facility] directly at [phone].  Normal or non-critical lab and imaging results will be communicated to you by MyChart, letter or phone within 4 business days after the clinic has received the results. If you do not hear from us within 7 days, please contact the clinic through MyChart or phone. If  "you have a critical or abnormal lab result, we will notify you by phone as soon as possible.  Submit refill requests through [employer] or call your pharmacy and they will forward the refill request to us. Please allow 3 business days for your refill to be completed.          Additional Information About Your Visit        [employer] Information     [employer] lets you send messages to your doctor, view your test results, renew your prescriptions, schedule appointments and more. To sign up, go to [email or web address] . Click on \"Log in\" on the left side of the screen, which will take you to the Welcome page. Then click on \"Sign up Now\" on the right side of the page.     You will be asked to enter the access code listed below, as well as some personal information. Please follow the directions to create your username and password.     Your access code is: [email or web address]  Expires: 2019  3:15 PM     Your access code will  in 90 days. If you need help or a new code, please call your [place] clinic or [phone].        Care EveryWhere ID     This is your Care EveryWhere ID. This could be used by other organizations to access your [place] medical records  [id number]        Your Vitals Were     Pulse Temperature Respirations Pulse Oximetry BMI (Body Mass Index)       66 98.4  F (36.9  C) (Tympanic) 16 94% 25.4 kg/m2        Blood Pressure from Last 3 Encounters:   18 100/60   18 98/56   18 128/88    Weight from Last 3 Encounters:   18 140 lb (63.5 kg)   18 141 lb (64 kg)   18 137 lb (62.1 kg)              We Performed the Following     FLU VACCINE, (RIV4) RECOMBINANT HA  , IM (FluBlok, egg free) [73557]- >18 YRS (FMG recommended  50-64 YRS)        Primary Care Provider Office Phone # Fax #    Mazin Ford [phone] [address] [place] [id number]        Equal Access to Services     [facility]: imelda Davis " chance singhmarilia olivarezchase moreno. So [facility] [phone].    ATENCIÓN: Si deborah tafoya, tiene a mcneil disposición servicios gratuitos de asistencia lingüística. Kala al [phone].    We comply with applicable federal civil rights laws and [place] laws. We do not discriminate on the basis of race, color, national origin, age, disability, sex, sexual orientation, or gender identity.            Thank you!     Thank you for choosing [facility] [place]  for your care. Our goal is always to provide you with excellent care. Hearing back from our patients is one way we can continue to improve our services. Please take a few minutes to complete the written survey that you may receive in the mail after your visit with us. Thank you!             Your Updated Medication List - Protect others around you: Learn how to safely use, store and throw away your medicines at www.disposemymeds.org.          This list is accurate as of 11/16/18  3:15 PM.  Always use your most recent med list.                   Brand Name Dispense Instructions for use Diagnosis    glipiZIDE 5 MG 24 hr tablet    GLUCOTROL XL    90 tablet    Take 1 tablet (5 mg) by mouth daily    Type 2 diabetes mellitus without complication, without long-term current use of insulin (H)       lisinopril 10 MG tablet    PRINIVIL/ZESTRIL    90 tablet    Take 1 tablet (10 mg) by mouth every morning    Essential hypertension       lovastatin 40 MG tablet    MEVACOR    90 tablet    TAKE ONE TABLET BY MOUTH AT BEDTIME    Hyperlipidemia LDL goal <100       metFORMIN 1000 MG tablet    GLUCOPHAGE    180 tablet    TAKE ONE TABLET BY MOUTH TWICE A DAY WITH MEALS (BREAKFAST AND DINNER)    Type 2 diabetes mellitus without complication, without long-term current use of insulin (H)       omeprazole 40 MG capsule    priLOSEC    90 capsule    TAKE ONE CAPSULE BY MOUTH EVERY DAY; TAKE 30 TO 60 MINUTES BEFORE A MEAL    Malignant neoplasm of  stomach, unspecified location (H), Abdominal pain, generalized

## 2019-04-05 ENCOUNTER — TRANSFERRED RECORDS (OUTPATIENT)
Dept: HEALTH INFORMATION MANAGEMENT | Facility: CLINIC | Age: 64
End: 2019-04-05

## 2019-04-09 PROBLEM — I72.3 ILIAC ARTERY ANEURYSM, RIGHT (H): Status: ACTIVE | Noted: 2019-04-09

## 2019-05-02 ENCOUNTER — DOCUMENTATION ONLY (OUTPATIENT)
Dept: PEDIATRICS | Facility: CLINIC | Age: 64
End: 2019-05-02

## 2019-05-02 DIAGNOSIS — I10 ESSENTIAL HYPERTENSION: ICD-10-CM

## 2019-05-02 DIAGNOSIS — E11.9 TYPE 2 DIABETES MELLITUS WITHOUT COMPLICATION, WITHOUT LONG-TERM CURRENT USE OF INSULIN (H): Primary | ICD-10-CM

## 2019-05-02 DIAGNOSIS — E78.5 HYPERLIPIDEMIA LDL GOAL <100: ICD-10-CM

## 2019-05-02 NOTE — PROGRESS NOTES
Nader has a lab only appt 5/9 and no open orders. Please place any future orders needed.    Thanks,  Lab

## 2019-05-03 ENCOUNTER — TRANSFERRED RECORDS (OUTPATIENT)
Dept: SURGERY | Facility: CLINIC | Age: 64
End: 2019-05-03

## 2019-05-03 ENCOUNTER — TRANSFERRED RECORDS (OUTPATIENT)
Dept: HEALTH INFORMATION MANAGEMENT | Facility: CLINIC | Age: 64
End: 2019-05-03

## 2019-05-09 DIAGNOSIS — I10 ESSENTIAL HYPERTENSION: ICD-10-CM

## 2019-05-09 DIAGNOSIS — E11.9 TYPE 2 DIABETES MELLITUS WITHOUT COMPLICATION, WITHOUT LONG-TERM CURRENT USE OF INSULIN (H): ICD-10-CM

## 2019-05-09 DIAGNOSIS — E78.5 HYPERLIPIDEMIA LDL GOAL <100: ICD-10-CM

## 2019-05-09 LAB
ALBUMIN SERPL-MCNC: 3.6 G/DL (ref 3.4–5)
ALP SERPL-CCNC: 78 U/L (ref 40–150)
ALT SERPL W P-5'-P-CCNC: 22 U/L (ref 0–70)
ANION GAP SERPL CALCULATED.3IONS-SCNC: 6 MMOL/L (ref 3–14)
AST SERPL W P-5'-P-CCNC: 22 U/L (ref 0–45)
BILIRUB SERPL-MCNC: 0.7 MG/DL (ref 0.2–1.3)
BUN SERPL-MCNC: 23 MG/DL (ref 7–30)
CALCIUM SERPL-MCNC: 9.1 MG/DL (ref 8.5–10.1)
CHLORIDE SERPL-SCNC: 101 MMOL/L (ref 94–109)
CHOLEST SERPL-MCNC: 168 MG/DL
CO2 SERPL-SCNC: 26 MMOL/L (ref 20–32)
CREAT SERPL-MCNC: 1.14 MG/DL (ref 0.66–1.25)
CREAT UR-MCNC: 62 MG/DL
GFR SERPL CREATININE-BSD FRML MDRD: 69 ML/MIN/{1.73_M2}
GLUCOSE SERPL-MCNC: 132 MG/DL (ref 70–99)
HBA1C MFR BLD: 6.9 % (ref 0–5.6)
HDLC SERPL-MCNC: 46 MG/DL
LDLC SERPL CALC-MCNC: 113 MG/DL
MICROALBUMIN UR-MCNC: 24 MG/L
MICROALBUMIN/CREAT UR: 39.03 MG/G CR (ref 0–17)
NONHDLC SERPL-MCNC: 122 MG/DL
POTASSIUM SERPL-SCNC: 4.3 MMOL/L (ref 3.4–5.3)
PROT SERPL-MCNC: 7.6 G/DL (ref 6.8–8.8)
SODIUM SERPL-SCNC: 133 MMOL/L (ref 133–144)
T4 FREE SERPL-MCNC: 0.96 NG/DL (ref 0.76–1.46)
TRIGL SERPL-MCNC: 47 MG/DL
TSH SERPL DL<=0.005 MIU/L-ACNC: 4.8 MU/L (ref 0.4–4)

## 2019-05-09 PROCEDURE — 36415 COLL VENOUS BLD VENIPUNCTURE: CPT | Performed by: INTERNAL MEDICINE

## 2019-05-09 PROCEDURE — 83036 HEMOGLOBIN GLYCOSYLATED A1C: CPT | Performed by: INTERNAL MEDICINE

## 2019-05-09 PROCEDURE — 80061 LIPID PANEL: CPT | Performed by: INTERNAL MEDICINE

## 2019-05-09 PROCEDURE — 82043 UR ALBUMIN QUANTITATIVE: CPT | Performed by: INTERNAL MEDICINE

## 2019-05-09 PROCEDURE — 84443 ASSAY THYROID STIM HORMONE: CPT | Performed by: INTERNAL MEDICINE

## 2019-05-09 PROCEDURE — 84439 ASSAY OF FREE THYROXINE: CPT | Performed by: INTERNAL MEDICINE

## 2019-05-09 PROCEDURE — 80053 COMPREHEN METABOLIC PANEL: CPT | Performed by: INTERNAL MEDICINE

## 2019-05-10 ENCOUNTER — TELEPHONE (OUTPATIENT)
Dept: OTHER | Facility: CLINIC | Age: 64
End: 2019-05-10

## 2019-05-10 ENCOUNTER — OFFICE VISIT (OUTPATIENT)
Dept: PEDIATRICS | Facility: CLINIC | Age: 64
End: 2019-05-10
Payer: COMMERCIAL

## 2019-05-10 VITALS
SYSTOLIC BLOOD PRESSURE: 80 MMHG | TEMPERATURE: 96 F | OXYGEN SATURATION: 98 % | HEART RATE: 61 BPM | DIASTOLIC BLOOD PRESSURE: 60 MMHG | BODY MASS INDEX: 24.98 KG/M2 | WEIGHT: 137.7 LBS

## 2019-05-10 DIAGNOSIS — I72.3 ILIAC ARTERY ANEURYSM, RIGHT (H): ICD-10-CM

## 2019-05-10 DIAGNOSIS — E78.5 HYPERLIPIDEMIA LDL GOAL <100: ICD-10-CM

## 2019-05-10 DIAGNOSIS — C16.9 MALIGNANT NEOPLASM OF STOMACH, UNSPECIFIED LOCATION (H): ICD-10-CM

## 2019-05-10 DIAGNOSIS — I10 ESSENTIAL HYPERTENSION: ICD-10-CM

## 2019-05-10 DIAGNOSIS — R10.84 ABDOMINAL PAIN, GENERALIZED: ICD-10-CM

## 2019-05-10 DIAGNOSIS — E11.9 TYPE 2 DIABETES MELLITUS WITHOUT COMPLICATION, WITHOUT LONG-TERM CURRENT USE OF INSULIN (H): Primary | ICD-10-CM

## 2019-05-10 PROCEDURE — 99214 OFFICE O/P EST MOD 30 MIN: CPT | Performed by: INTERNAL MEDICINE

## 2019-05-10 RX ORDER — CETIRIZINE HYDROCHLORIDE 10 MG/1
10 TABLET ORAL DAILY
COMMUNITY

## 2019-05-10 RX ORDER — LISINOPRIL 5 MG/1
5 TABLET ORAL DAILY
Qty: 90 TABLET | Refills: 3 | Status: SHIPPED | OUTPATIENT
Start: 2019-05-10 | End: 2020-02-07

## 2019-05-10 RX ORDER — GLIPIZIDE 5 MG/1
5 TABLET, FILM COATED, EXTENDED RELEASE ORAL DAILY
Qty: 90 TABLET | Refills: 3 | Status: SHIPPED | OUTPATIENT
Start: 2019-05-10 | End: 2020-02-07

## 2019-05-10 RX ORDER — OMEPRAZOLE 40 MG/1
CAPSULE, DELAYED RELEASE ORAL
Qty: 90 CAPSULE | Refills: 3 | Status: SHIPPED | OUTPATIENT
Start: 2019-05-10 | End: 2020-02-07

## 2019-05-10 RX ORDER — LOVASTATIN 40 MG
TABLET ORAL
Qty: 90 TABLET | Refills: 3 | Status: SHIPPED | OUTPATIENT
Start: 2019-05-10 | End: 2020-02-07

## 2019-05-10 NOTE — PROGRESS NOTES
SUBJECTIVE:   Nader Alfaro is a 63 year old male who presents to clinic today for the following   health issues:      Diabetes Follow-up    Patient is checking blood sugars: twice daily.    Blood sugar testing frequency justification: doesn't check very often  Results are as follows:         unsure    Diabetic concerns: None     Symptoms of hypoglycemia (low blood sugar): none     Paresthesias (numbness or burning in feet) or sores: Yes only in hands not feet     Date of last diabetic eye exam: never    Diabetes Management Resources    Hyperlipidemia Follow-Up      Rate your low fat/cholesterol diet?: good    Taking statin?  Yes, unsure due to chemo meds    Other lipid medications/supplements?:  none    Hypertension Follow-up      Outpatient blood pressures yes two times at home    Low Salt Diet: low salt- regular      BP Readings from Last 2 Encounters:   05/10/19 (!) 80/60   11/16/18 100/60     Hemoglobin A1C (%)   Date Value   05/09/2019 6.9 (H)   11/16/2018 7.1 (H)     LDL Cholesterol Calculated (mg/dL)   Date Value   05/09/2019 113 (H)   01/29/2018 89       Amount of exercise or physical activity: 2-3 days/week for an average of less than 15 minutes    Problems taking medications regularly: No    Medication side effects: none    Diet: diabetic    Recently had CT Scan done for oncology surveillance. Right proximal common iliac artery aneurysm has increased in size in last few years.   Recommended to have a vascular consultation.      Reviewed and updated as needed this visit by Provider  Tobacco  Allergies  Meds  Problems  Med Hx  Surg Hx  Fam Hx         Labs reviewed in EPIC    ROS:  Constitutional, HEENT, cardiovascular, pulmonary, gi and gu systems are negative, except as otherwise noted.    OBJECTIVE:     BP (!) 80/60 (BP Location: Right arm, Patient Position: Sitting, Cuff Size: Adult Regular)   Pulse 61   Temp 96  F (35.6  C) (Tympanic)   Wt 62.5 kg (137 lb 11.2 oz)   SpO2 98%   BMI 24.98 kg/m     Body mass index is 24.98 kg/m .  GEN: no distress  SKIN: no rashes  HEENT: PERRL. EOMI. TM's clear bilaterally. Nasal mucosa normal. OP moist without lesions.  NECK: Supple. No LAD or TM.  LUNGS: Clear to auscultation bilaterally. No rhonchi, rales, wheezes or retractions.  CV: Regular rate and rhythm.  No murmurs, rubs or gallops. Pulses 2+ radial.  ABD: BS+. S, ND, NT.  EXTR: no edema  PSYCH: Normal affect. Well groomed. Good eye contact.     ASSESSMENT/PLAN:       ICD-10-CM    1. Type 2 diabetes mellitus without complication, without long-term current use of insulin (H) E11.9 lisinopril (PRINIVIL/ZESTRIL) 5 MG tablet     glipiZIDE (GLUCOTROL XL) 5 MG 24 hr tablet     metFORMIN (GLUCOPHAGE) 1000 MG tablet   2. Hypertension I10 lisinopril (PRINIVIL/ZESTRIL) 5 MG tablet   3. Hyperlipidemia LDL goal <100 E78.5 lovastatin (MEVACOR) 40 MG tablet   4. Malignant neoplasm of stomach, unspecified location (H) C16.9 omeprazole (PRILOSEC) 40 MG DR capsule   5. Iliac artery aneurysm, right (H) I72.3 VASCULAR SURGERY REFERRAL   6. Abdominal pain, generalized R10.84 omeprazole (PRILOSEC) 40 MG DR capsule     DM- under good control.  Lab Results   Component Value Date    A1C 6.9 05/09/2019     HTN - BP is low today. Concerning level so will adjust his medications.  They do check BP at home, typically in the low 100's systolic.    Patient Instructions   Change Lisinopril to 5 mg once per day   OK to take 1/2 of your 10 mg tablet per day    Goal is to have your systolic (top) number >100, with >110 better    Continue with your other medications    Vascular surgery referral placed.    Mazin Ford MD  [facility]

## 2019-05-10 NOTE — TELEPHONE ENCOUNTER
Pt referred to [facility] by Mazin Ford MD for Right iliac artery aneurysm, incidental finding on CT from oncology.     CT chest/abd/pelvis 4/5/19 in [facility].     Pt needs to be scheduled for consult with vascular surgery or IR. Will route to scheduling to coordinate an appointment at next available.    VALDO KempN, RN

## 2019-05-10 NOTE — PATIENT INSTRUCTIONS
Change Lisinopril to 5 mg once per day   OK to take 1/2 of your 10 mg tablet per day    Goal is to have your systolic (top) number >100, with >110 better    Continue with your other medications

## 2019-05-15 DIAGNOSIS — I10 ESSENTIAL HYPERTENSION: ICD-10-CM

## 2019-05-15 RX ORDER — LISINOPRIL 10 MG/1
TABLET ORAL
Qty: 90 TABLET | Refills: 3 | OUTPATIENT
Start: 2019-05-15

## 2019-05-15 NOTE — TELEPHONE ENCOUNTER
"MAY NOT BE DUE FOR A REFILL.        Requested Prescriptions   Pending Prescriptions Disp Refills     lisinopril (PRINIVIL/ZESTRIL) 10 MG tablet [Pharmacy Med Name: LISINOPRIL 10MG TABS] 90 tablet 3     Sig: TAKE ONE TABLET BY MOUTH EVERY MORNING  Last Written Prescription Date:  05/10/2019  Last Fill Quantity: 90 tablet,  # refills: 3    Last office visit: 5/10/2019 with prescribing provider:  Mazin Ford MD        Future Office Visit:           ACE Inhibitors (Including Combos) Protocol Passed - 5/15/2019  1:55 PM        Passed - Blood pressure under 140/90 in past 12 months     BP Readings from Last 3 Encounters:   05/10/19 (!) 80/60   11/16/18 100/60   08/17/18 98/56                 Passed - Recent (12 mo) or future (30 days) visit within the authorizing provider's specialty     Patient had office visit in the last 12 months or has a visit in the next 30 days with authorizing provider or within the authorizing provider's specialty.  See \"Patient Info\" tab in inbasket, or \"Choose Columns\" in Meds & Orders section of the refill encounter.              Passed - Medication is active on med list        Passed - Patient is age 18 or older        Passed - Normal serum creatinine on file in past 12 months     Recent Labs   Lab Test 05/09/19  0922  10/20/14  2120   CR 1.14   < >  --    CREAT  --   --  1.3*    < > = values in this interval not displayed.             Passed - Normal serum potassium on file in past 12 months     Recent Labs   Lab Test 05/09/19 0922   POTASSIUM 4.3               "

## 2019-05-15 NOTE — TELEPHONE ENCOUNTER
Patient's daughter (Aissatou) called to schedule her dad in [facility].  He is scheduled for 05/16/19 at 1:15pm with Dr Morales.

## 2019-05-16 ENCOUNTER — OFFICE VISIT (OUTPATIENT)
Dept: SURGERY | Facility: CLINIC | Age: 64
End: 2019-05-16
Attending: INTERNAL MEDICINE
Payer: COMMERCIAL

## 2019-05-16 VITALS
BODY MASS INDEX: 25.21 KG/M2 | SYSTOLIC BLOOD PRESSURE: 112 MMHG | OXYGEN SATURATION: 95 % | DIASTOLIC BLOOD PRESSURE: 74 MMHG | RESPIRATION RATE: 16 BRPM | HEART RATE: 75 BPM | HEIGHT: 62 IN | WEIGHT: 137 LBS

## 2019-05-16 DIAGNOSIS — I72.3 ILIAC ARTERY ANEURYSM, RIGHT (H): ICD-10-CM

## 2019-05-16 PROCEDURE — 99203 OFFICE O/P NEW LOW 30 MIN: CPT | Performed by: SURGERY

## 2019-05-16 ASSESSMENT — MIFFLIN-ST. JEOR: SCORE: 1309.65

## 2019-05-16 NOTE — LETTER
[facility] [address]  [place] [address]  Phone: [phone]  Fax: [phone]      May 16, 2019       Re: Nader BECKI Dominic - 1955    64 y/o  male with a known right common iliac artery aneurysm which is now 2.9 cm in maximum diameter it apparently has been increasing in size over the last few years.  I have no films to review at this appointment.  He is asymptomatic.  I had a long discussion with the patient and his wife regarding the aneurysm, etiology, natural history, repair options risks and goals.  I do not believe intervention is indicated at this size--once the aneurysm is approaching 3.5cm would advise repair.  I will acquire the films and see if this is amenable to an endovascular repair.  We can then decide regarding timing of surveillance.     Shawn Moraels MD

## 2019-05-16 NOTE — PROGRESS NOTES
62 y/o  male with a known right common iliac artery aneurysm which is now 2.9 cm in maximum diameter it apparently has been increasing in size over the last few years.  I have no films to review at this appointment.  He is asymptomatic.  I had a long discussion with the patient and his wife regarding the aneurysm, etiology, natural history, repair options risks and goals.  I do not believe intervention is indicated at this size--once the aneurysm is approaching 3.5cm would advise repair.  I will acquire the films and see if this is amenable to an endovascular repair.  We can then decide regarding timing of surveillance. Face to face time 30 minutes greater than 50% in consultation.

## 2019-05-31 ENCOUNTER — TELEPHONE (OUTPATIENT)
Dept: OTHER | Facility: CLINIC | Age: 64
End: 2019-05-31

## 2019-05-31 NOTE — TELEPHONE ENCOUNTER
Spoke with pt's daughter, May, and per Dr. Morales, recommends patient to follow up in one year with repeat CTA abdomen/pelvis.  Requested imaging to be done at [facility] or [facility].  May aware a letter will be sent out next year to schedule follow up appointment.  She had no further questions at this time.  Marsha Betancourt, VALDON, RN

## 2019-11-06 ENCOUNTER — TELEPHONE (OUTPATIENT)
Dept: PEDIATRICS | Facility: CLINIC | Age: 64
End: 2019-11-06

## 2019-11-06 NOTE — TELEPHONE ENCOUNTER
Reason for Call:  Other appointment    Detailed comments: needs 6 month DM follow up / with wife May Heu    Phone Number Patient can be reached at:  [phone]    Best Time:  After 12 noon    Can we leave a detailed message on this number? NO    Call taken on 11/6/2019 at 3:11 PM by Stacey Ji

## 2019-11-07 NOTE — TELEPHONE ENCOUNTER
Spouse (May) will have patient callback to schedule when is convenient for him.    Thanks  Kirit AGUSTIN  Team C[employer]inator

## 2019-11-07 NOTE — TELEPHONE ENCOUNTER
MA/GAMAL-    Can you please assist in scheduling for DM follow up? Thank you. Tonja Nova RN on 11/7/2019 at 11:55 AM

## 2020-02-05 ENCOUNTER — TELEPHONE (OUTPATIENT)
Dept: PEDIATRICS | Facility: CLINIC | Age: 65
End: 2020-02-05

## 2020-02-05 NOTE — TELEPHONE ENCOUNTER
"Pre-Visit Planning     Future Appointments   Date Time Provider Department Center   2/7/2020  9:45 AM Mazin Ford MD [facility]     Arrival Time for this Appointment:    Appointment Notes for this encounter:   Data Unavailable    Questionnaires Reviewed/Assigned  No additional questionnaires are needed        Patient preferred phone number: [phone]    Spoke to patient via phone. Patient does not have additional questions or concerns.        Visit is not preventive.    Health Maintenance Due   Topic Date Due     ANNUAL REVIEW OF HM ORDERS  1955     ADVANCE CARE PLANNING  1955     EYE EXAM  1955     HIV SCREENING  06/03/1972     PNEUMOCOCCAL IMMUNIZATION 19-64 MEDIUM RISK (1 of 1 - PPSV23) 06/03/1976     ZOSTER IMMUNIZATION (1 of 2) 06/03/2007     MEDICARE ANNUAL WELLNESS VISIT  11/29/2008     DTAP/TDAP/TD IMMUNIZATION (2 - Td) 11/29/2017     INFLUENZA VACCINE (1) 09/01/2019     A1C  11/09/2019     DIABETIC FOOT EXAM  11/16/2019     PHQ-2  01/01/2020     Patient is due for:    [employer]  Patient is not active on [employer]. Encouraged [employer] activation.    Questionnaire Review   Advised patient to arrive early in order to complete questionnaires.    Call Summary  \"Thank you for your time today.  If anything comes up before your appointment, please feel free to contact us at [phone].\"    "

## 2020-02-07 ENCOUNTER — OFFICE VISIT (OUTPATIENT)
Dept: PEDIATRICS | Facility: CLINIC | Age: 65
End: 2020-02-07
Payer: COMMERCIAL

## 2020-02-07 VITALS
HEIGHT: 62 IN | DIASTOLIC BLOOD PRESSURE: 68 MMHG | HEART RATE: 69 BPM | WEIGHT: 142 LBS | BODY MASS INDEX: 26.13 KG/M2 | RESPIRATION RATE: 16 BRPM | OXYGEN SATURATION: 97 % | TEMPERATURE: 98 F | SYSTOLIC BLOOD PRESSURE: 102 MMHG

## 2020-02-07 DIAGNOSIS — E11.9 TYPE 2 DIABETES MELLITUS WITHOUT COMPLICATION, WITHOUT LONG-TERM CURRENT USE OF INSULIN (H): Primary | ICD-10-CM

## 2020-02-07 DIAGNOSIS — C16.9 MALIGNANT NEOPLASM OF STOMACH, UNSPECIFIED LOCATION (H): ICD-10-CM

## 2020-02-07 DIAGNOSIS — R10.84 ABDOMINAL PAIN, GENERALIZED: ICD-10-CM

## 2020-02-07 DIAGNOSIS — E78.5 HYPERLIPIDEMIA LDL GOAL <100: ICD-10-CM

## 2020-02-07 DIAGNOSIS — I10 ESSENTIAL HYPERTENSION: ICD-10-CM

## 2020-02-07 DIAGNOSIS — M19.049 ARTHRITIS OF FINGER: ICD-10-CM

## 2020-02-07 DIAGNOSIS — I72.3 ILIAC ARTERY ANEURYSM, RIGHT (H): ICD-10-CM

## 2020-02-07 DIAGNOSIS — Z23 NEED FOR VACCINATION: ICD-10-CM

## 2020-02-07 LAB
ALBUMIN SERPL-MCNC: 3.6 G/DL (ref 3.4–5)
ALP SERPL-CCNC: 69 U/L (ref 40–150)
ALT SERPL W P-5'-P-CCNC: 34 U/L (ref 0–70)
ANION GAP SERPL CALCULATED.3IONS-SCNC: 7 MMOL/L (ref 3–14)
AST SERPL W P-5'-P-CCNC: 23 U/L (ref 0–45)
BILIRUB SERPL-MCNC: 0.7 MG/DL (ref 0.2–1.3)
BUN SERPL-MCNC: 24 MG/DL (ref 7–30)
CALCIUM SERPL-MCNC: 9 MG/DL (ref 8.5–10.1)
CHLORIDE SERPL-SCNC: 102 MMOL/L (ref 94–109)
CHOLEST SERPL-MCNC: 159 MG/DL
CO2 SERPL-SCNC: 26 MMOL/L (ref 20–32)
CREAT SERPL-MCNC: 1.03 MG/DL (ref 0.66–1.25)
CREAT UR-MCNC: 47 MG/DL
GFR SERPL CREATININE-BSD FRML MDRD: 77 ML/MIN/{1.73_M2}
GLUCOSE SERPL-MCNC: 151 MG/DL (ref 70–99)
HBA1C MFR BLD: 6.6 % (ref 0–5.6)
HDLC SERPL-MCNC: 41 MG/DL
LDLC SERPL CALC-MCNC: 93 MG/DL
MICROALBUMIN UR-MCNC: 24 MG/L
MICROALBUMIN/CREAT UR: 51.6 MG/G CR (ref 0–17)
NONHDLC SERPL-MCNC: 118 MG/DL
POTASSIUM SERPL-SCNC: 3.9 MMOL/L (ref 3.4–5.3)
PROT SERPL-MCNC: 7.3 G/DL (ref 6.8–8.8)
SODIUM SERPL-SCNC: 135 MMOL/L (ref 133–144)
TRIGL SERPL-MCNC: 123 MG/DL

## 2020-02-07 PROCEDURE — 90750 HZV VACC RECOMBINANT IM: CPT | Performed by: INTERNAL MEDICINE

## 2020-02-07 PROCEDURE — 90471 IMMUNIZATION ADMIN: CPT | Performed by: INTERNAL MEDICINE

## 2020-02-07 PROCEDURE — 90472 IMMUNIZATION ADMIN EACH ADD: CPT | Performed by: INTERNAL MEDICINE

## 2020-02-07 PROCEDURE — 82043 UR ALBUMIN QUANTITATIVE: CPT | Performed by: INTERNAL MEDICINE

## 2020-02-07 PROCEDURE — 36415 COLL VENOUS BLD VENIPUNCTURE: CPT | Performed by: INTERNAL MEDICINE

## 2020-02-07 PROCEDURE — 80053 COMPREHEN METABOLIC PANEL: CPT | Performed by: INTERNAL MEDICINE

## 2020-02-07 PROCEDURE — 83036 HEMOGLOBIN GLYCOSYLATED A1C: CPT | Performed by: INTERNAL MEDICINE

## 2020-02-07 PROCEDURE — 80061 LIPID PANEL: CPT | Performed by: INTERNAL MEDICINE

## 2020-02-07 PROCEDURE — 90714 TD VACC NO PRESV 7 YRS+ IM: CPT | Performed by: INTERNAL MEDICINE

## 2020-02-07 PROCEDURE — 99214 OFFICE O/P EST MOD 30 MIN: CPT | Mod: 25 | Performed by: INTERNAL MEDICINE

## 2020-02-07 RX ORDER — LISINOPRIL 5 MG/1
5 TABLET ORAL DAILY
Qty: 90 TABLET | Refills: 3 | Status: SHIPPED | OUTPATIENT
Start: 2020-02-07 | End: 2021-02-05

## 2020-02-07 RX ORDER — OMEPRAZOLE 40 MG/1
CAPSULE, DELAYED RELEASE ORAL
Qty: 90 CAPSULE | Refills: 3 | Status: SHIPPED | OUTPATIENT
Start: 2020-02-07 | End: 2021-02-05

## 2020-02-07 RX ORDER — GLIPIZIDE 5 MG/1
5 TABLET, FILM COATED, EXTENDED RELEASE ORAL DAILY
Qty: 90 TABLET | Refills: 3 | Status: SHIPPED | OUTPATIENT
Start: 2020-02-07 | End: 2021-02-05

## 2020-02-07 RX ORDER — LOVASTATIN 40 MG
TABLET ORAL
Qty: 90 TABLET | Refills: 3 | Status: SHIPPED | OUTPATIENT
Start: 2020-02-07 | End: 2021-02-05

## 2020-02-07 ASSESSMENT — MIFFLIN-ST. JEOR: SCORE: 1323.49

## 2020-02-07 NOTE — PROGRESS NOTES
Subjective     Nader Alfaro is a 64 year old male who presents to clinic today for the following health issues:    HPI   Diabetes Follow-up      How often are you checking your blood sugar? Not at all    What concerns do you have today about your diabetes? Other: Has concerns yes     Do you have any of these symptoms? (Select all that apply)  No numbness or tingling in feet.  No redness, sores or blisters on feet.  No complaints of excessive thirst.  No reports of blurry vision.  No significant changes to weight. Hands are a concern as far as blisters, sores, or markings.     Have you had a diabetic eye exam in the last 12 months? No    Lab Results   Component Value Date    A1C 6.9 05/09/2019    A1C 7.1 11/16/2018       Hyperlipidemia Follow-Up      Are you regularly taking any medication or supplement to lower your cholesterol?   Yes- Mevacor    Are you having muscle aches or other side effects that you think could be caused by your cholesterol lowering medication?  No      BP Readings from Last 2 Encounters:   02/07/20 102/68   05/16/19 112/74     Hemoglobin A1C (%)   Date Value   05/09/2019 6.9 (H)   11/16/2018 7.1 (H)     LDL Cholesterol Calculated (mg/dL)   Date Value   05/09/2019 113 (H)   01/29/2018 89     Hx of gastric cancer.   Followed by oncology.    Found to have an iliac artery aneurysm.  Evaluated by vascular. No intervention until 3.5 cm or larger.      How many servings of fruits and vegetables do you eat daily?  2-3    On average, how many sweetened beverages do you drink each day (Examples: soda, juice, sweet tea, etc.  Do NOT count diet or artificially sweetened beverages)?   0    How many days per week do you exercise enough to make your heart beat faster? 7    How many minutes a day do you exercise enough to make your heart beat faster? 30 - 60    How many days per week do you miss taking your medication? 0        Reviewed and updated as needed this visit by Provider  Tobacco  Allergies  Meds   "Problems  Med Hx  Surg Hx  Fam Hx         Review of Systems   ROS COMP: Constitutional, HEENT, cardiovascular, pulmonary, gi and gu systems are negative, except as otherwise noted.      Objective    /68 (BP Location: Right arm, Patient Position: Sitting, Cuff Size: Adult Regular)   Pulse 69   Temp 98  F (36.7  C) (Tympanic)   Resp 16   Ht 1.575 m (5' 2.01\")   Wt 64.4 kg (142 lb)   SpO2 97%   BMI 25.97 kg/m    Body mass index is 25.97 kg/m .  Physical Exam   GENERAL: healthy, alert and no distress  EYES: Eyes grossly normal to inspection, PERRL and conjunctivae and sclerae normal  HENT: ear canals and TM's normal, nose and mouth without ulcers or lesions  NECK: no adenopathy, no asymmetry, masses, or scars and thyroid normal to palpation  RESP: lungs clear to auscultation - no rales, rhonchi or wheezes  CV: regular rate and rhythm, normal S1 S2, no S3 or S4, no murmur, click or rub, no peripheral edema and peripheral pulses strong  ABDOMEN: soft, nontender, no hepatosplenomegaly, no masses and bowel sounds normal  MS: no gross musculoskeletal defects noted, no edema  SKIN: no suspicious lesions or rashes  NEURO: Normal strength and tone, mentation intact and speech normal  PSYCH: mentation appears normal, affect normal/bright  Diabetic foot exam: normal DP and PT pulses, no trophic changes or ulcerative lesions and normal monofilament exam          Assessment & Plan       ICD-10-CM    1. Type 2 diabetes mellitus without complication, without long-term current use of insulin (H) E11.9 glipiZIDE (GLUCOTROL XL) 5 MG 24 hr tablet     lisinopril (PRINIVIL/ZESTRIL) 5 MG tablet     metFORMIN (GLUCOPHAGE) 1000 MG tablet     Comprehensive metabolic panel     Hemoglobin A1c     Lipid panel reflex to direct LDL Fasting     Albumin Random Urine Quantitative with Creat Ratio     blood glucose monitoring (NO BRAND SPECIFIED) meter device kit     blood glucose (NO BRAND SPECIFIED) test strip     blood glucose (NO " BRAND SPECIFIED) lancets standard   2. Hyperlipidemia LDL goal <100 E78.5 lovastatin (MEVACOR) 40 MG tablet     Comprehensive metabolic panel     Lipid panel reflex to direct LDL Fasting   3. Hypertension I10 lisinopril (PRINIVIL/ZESTRIL) 5 MG tablet     Comprehensive metabolic panel   4. Iliac artery aneurysm, right (H) I72.3    5. Malignant neoplasm of stomach, unspecified location (H) C16.9 omeprazole (PRILOSEC) 40 MG DR capsule   6. Arthritis of finger M19.049 ORTHOPEDICS ADULT REFERRAL   7. Abdominal pain, generalized R10.84    8. Need for vaccination Z23 TD PRESERV FREE, IM (7+ YRS)     ZOSTER VACCINE RECOMBINANT ADJUVANTED IM NJX     Patient Instructions   Check labwork today   I will contact you with the results    Continue with your current medications      Mazin Ford MD  [facility]

## 2020-02-07 NOTE — LETTER
[address] [address]                  [phone]   February 10, 2020    Nader CONNELL [place]  [address] [facility] [address]      Nader:     Your tests are all complete. The results show:     1. Your metabolic panel, including liver and kidney function, glucose (blood sugar) and electrolytes, is within expected limits.     2. Your hemoglobin A1C (long-range glucose control) indicates very good diabetes control. An optimal A1C is <7%.     3. Your cholesterol profile looks great!     Your Lipid Goals:   Cholesterol: Desirable is less than 200, Borderline is 200-240   HDL (Good Cholesterol): Desirable is greater than 40   LDL (Bad Cholesterol): Desirable is less than 100, Borderline 100-130   Triglycerides (Fats in the Blood): Desirable is less than 150,  Borderline is 150-200     4. Your microalbumin level (check for protein in the urine) is just above normal. The level is concerning if it is over 300 and worrisome if over 3000. You should have this checked once per year to ensure that your diabetes is not adversely affecting your kidney function.       Please feel free to contact me if you have any questions or concerns.       Mazin Ford         Results for orders placed or performed in visit on 02/07/20   Comprehensive metabolic panel     Status: Abnormal   Result Value Ref Range    Sodium 135 133 - 144 mmol/L    Potassium 3.9 3.4 - 5.3 mmol/L    Chloride 102 94 - 109 mmol/L    Carbon Dioxide 26 20 - 32 mmol/L    Anion Gap 7 3 - 14 mmol/L    Glucose 151 (H) 70 - 99 mg/dL    Urea Nitrogen 24 7 - 30 mg/dL    Creatinine 1.03 0.66 - 1.25 mg/dL    GFR Estimate 77 >60 mL/min/[1.73_m2]    GFR Estimate If Black 89 >60 mL/min/[1.73_m2]    Calcium 9.0 8.5 - 10.1 mg/dL    Bilirubin Total 0.7 0.2 - 1.3 mg/dL    Albumin 3.6 3.4 - 5.0 g/dL    Protein Total 7.3 6.8 - 8.8 g/dL    Alkaline Phosphatase 69 40 - 150 U/L    ALT 34 0 - 70 U/L    AST 23 0 - 45 U/L    Hemoglobin A1c     Status: Abnormal   Result Value Ref Range    Hemoglobin A1C 6.6 (H) 0 - 5.6 %   Lipid panel reflex to direct LDL Fasting     Status: None   Result Value Ref Range    Cholesterol 159 <200 mg/dL    Triglycerides 123 <150 mg/dL    HDL Cholesterol 41 >39 mg/dL    LDL Cholesterol Calculated 93 <100 mg/dL    Non HDL Cholesterol 118 <130 mg/dL   Albumin Random Urine Quantitative with Creat Ratio     Status: Abnormal   Result Value Ref Range    Creatinine Urine 47 mg/dL    Albumin Urine mg/L 24 mg/L    Albumin Urine mg/g Cr 51.60 (H) 0 - 17 mg/g Cr

## 2020-02-07 NOTE — PATIENT INSTRUCTIONS
Check labwork today   I will contact you with the results    Continue with your current medications

## 2020-04-17 DIAGNOSIS — I72.3 ILIAC ARTERY ANEURYSM, RIGHT (H): Primary | ICD-10-CM

## 2020-05-04 ENCOUNTER — TRANSFERRED RECORDS (OUTPATIENT)
Dept: HEALTH INFORMATION MANAGEMENT | Facility: CLINIC | Age: 65
End: 2020-05-04

## 2020-05-08 ENCOUNTER — TRANSFERRED RECORDS (OUTPATIENT)
Dept: SURGERY | Facility: CLINIC | Age: 65
End: 2020-05-08

## 2020-05-08 ENCOUNTER — TRANSFERRED RECORDS (OUTPATIENT)
Dept: HEALTH INFORMATION MANAGEMENT | Facility: CLINIC | Age: 65
End: 2020-05-08

## 2020-05-20 ENCOUNTER — HOSPITAL ENCOUNTER (OUTPATIENT)
Dept: CT IMAGING | Facility: CLINIC | Age: 65
Discharge: HOME | End: 2020-05-20
Attending: SURGERY | Admitting: SURGERY
Payer: COMMERCIAL

## 2020-05-20 DIAGNOSIS — I72.3 ILIAC ARTERY ANEURYSM, RIGHT (H): ICD-10-CM

## 2020-05-20 LAB
CREAT BLD-MCNC: 1.2 MG/DL (ref 0.66–1.25)
GFR SERPL CREATININE-BSD FRML MDRD: 61 ML/MIN/{1.73_M2}

## 2020-05-20 PROCEDURE — 25000125 ZZHC RX 250: Performed by: SURGERY

## 2020-05-20 PROCEDURE — 74174 CTA ABD&PLVS W/CONTRAST: CPT

## 2020-05-20 PROCEDURE — 25000128 H RX IP 250 OP 636: Performed by: SURGERY

## 2020-05-20 PROCEDURE — 82565 ASSAY OF CREATININE: CPT

## 2020-05-20 RX ORDER — IOPAMIDOL 755 MG/ML
80 INJECTION, SOLUTION INTRAVASCULAR ONCE
Status: COMPLETED | OUTPATIENT
Start: 2020-05-20 | End: 2020-05-20

## 2020-05-20 RX ADMIN — SODIUM CHLORIDE 80 ML: 9 INJECTION, SOLUTION INTRAVENOUS at 14:15

## 2020-05-20 RX ADMIN — IOPAMIDOL 80 ML: 755 INJECTION, SOLUTION INTRAVENOUS at 14:14

## 2020-05-26 ENCOUNTER — VIRTUAL VISIT (OUTPATIENT)
Dept: OTHER | Facility: CLINIC | Age: 65
End: 2020-05-26
Attending: SURGERY
Payer: COMMERCIAL

## 2020-05-26 VITALS
BODY MASS INDEX: 25.21 KG/M2 | HEIGHT: 62 IN | WEIGHT: 137 LBS | SYSTOLIC BLOOD PRESSURE: 114 MMHG | DIASTOLIC BLOOD PRESSURE: 70 MMHG

## 2020-05-26 DIAGNOSIS — I72.3 ILIAC ARTERY ANEURYSM, RIGHT (H): Primary | ICD-10-CM

## 2020-05-26 PROCEDURE — 99207 ZZC NO CHARGE LOS: CPT | Mod: ZP | Performed by: SURGERY

## 2020-05-26 ASSESSMENT — MIFFLIN-ST. JEOR: SCORE: 1300.68

## 2020-05-26 NOTE — PROGRESS NOTES
Vascular Surgery TELEHEALTH VISIT     Nader Alfaro MRN# [id number]   YOB: 1955 Age: 64 year old        Reason for Clinic Call:  Imaging follow-up             History of Present Illness:   Nader Alfaro is a 64 year old male who presents for evaluation of a known right ROSINA aneurysm. He was initially followed by Dr. Morales for this.   I spoke with Mr. Alfaro and his wife on the phone with the aid of the [employer] .     Mr. Alfaro reports that he has been feeling well. He has not had any back, chest, or abdominal pain. He has not had recent changes in his medications, has not had recent hospitalizations, and has not had changes in his health.               Past Medical History:   I have personally reviewed the following:   Past Medical History:   Diagnosis Date     Essential hypertension      Gastric cancer (H) 2012     Gastric ulcer      Gastro-oesophageal reflux disease      GERD (gastroesophageal reflux disease)      Hyperlipidemia      Type 2 diabetes mellitus (H)              Past Surgical History:   I have personally reviewed the following:   Past Surgical History:   Procedure Laterality Date     Carpel tunnel surgery Right      COLONOSCOPY  2012    Procedure: COLONOSCOPY;  COLONOSCOPY & ESOPHAGOSCOPY, GASTROSCOPY, DUODENOSCOPY (EGD) and cold bx pre-pyloric large ulcer/mass/WW;  Surgeon: Endy Radford MD;  Location:  GI     ESOPHAGOSCOPY, GASTROSCOPY, DUODENOSCOPY (EGD), COMBINED       GASTRECTOMY  2012    Procedure: GASTRECTOMY;  Partial GASTRECTOMY  with lymph node dissection;  Surgeon: Jonathan Frausto MD;  Location:  OR            Social History:   I have personally reviewed the following:   Social History     Tobacco Use     Smoking status: Former Smoker     Packs/day: 0.10     Years: 15.00     Pack years: 1.50     Types: Cigarettes     Last attempt to quit: 1990     Years since quittin.4     Smokeless tobacco: Never Used   Substance Use Topics     Alcohol use: No  "    Alcohol/week: 0.0 standard drinks             Family History:     Family History   Problem Relation Age of Onset     C.A.D. No family hx of      Diabetes No family hx of      Hypertension No family hx of      Cerebrovascular Disease No family hx of      Breast Cancer No family hx of      Cancer - colorectal No family hx of      Prostate Cancer No family hx of              Allergies:     Allergies   Allergen Reactions     Seasonal Allergies              Medications:     Current Outpatient Medications Ordered in Epic   Medication     blood glucose (NO BRAND SPECIFIED) lancets standard     blood glucose (NO BRAND SPECIFIED) test strip     blood glucose monitoring (NO BRAND SPECIFIED) meter device kit     cetirizine (ZYRTEC) 10 MG tablet     glipiZIDE (GLUCOTROL XL) 5 MG 24 hr tablet     lisinopril (PRINIVIL/ZESTRIL) 5 MG tablet     lovastatin (MEVACOR) 40 MG tablet     metFORMIN (GLUCOPHAGE) 1000 MG tablet     omeprazole (PRILOSEC) 40 MG DR capsule     No current Epic-ordered facility-administered medications on file.              Review of Systems:   The 10 point Review of Systems is negative other than noted in the HPI          Physical Exam:     No physical exam done. This was a telehealth visit.          Data:   Labs:       Lab Results   Component Value Date     02/07/2020    Lab Results   Component Value Date    CHLORIDE 102 02/07/2020    Lab Results   Component Value Date    BUN 24 02/07/2020      Lab Results   Component Value Date    POTASSIUM 3.9 02/07/2020    Lab Results   Component Value Date    CO2 26 02/07/2020    Lab Results   Component Value Date    CR 1.03 02/07/2020          I have reviewed the following images:  CTA abd/pelvis (5/20/20): \"Abdominal aorta: There is scattered calcified plaque in the abdominal aorta. This does not contribute to a significant stenosis. There is mild ectasia of the distal abdominal aorta which measures approximately 2.5 x 2.6 cm. There is a moderate stenosis in " "the proximal celiac trunk. The superior mesenteric artery and inferior mesenteric artery are patent without significant stenoses. The renal arteries are patent without significant stenoses. Iliac arteries: There is a 3.0 cm right common iliac artery aneurysm. The left common iliac artery measures approximately 14 mm in diameter. The external iliac arteries and proximal femoral arteries are patent without significant stenoses.\" On my review of the previous films, this appears to be relatively stable in size and morphology, with approximately 1-2 mm of enlargement from 4/5/19.            Assessment and Plan:   Mr. Alfaro is a 64 year old male who presents for follow-up evaluation of a right common iliac artery aneurysm.       His right ROSINA is approximately 3.0 cm in greatest diameter, and aneurysmal enlargement appears to extend to both the aortic bifurcation and the iliac bifurcation.     I discussed the rationale for surgical repair. I discussed that while this does not seem to be rapidly expanding, it may have had slight interval increase in size, well under 3 mm in the last 12 months. I also explained that we consider repair when these reach 3 to 3.5 cm in size.    We briefly discussed open surgical and endovascular repair options (I believe he is a candidate, and would likely utilize a Freedom MARK to maintain flow to the right IIA) if he decides to proceed with surgery, vs. Ongoing surveillance imaging    He and his wife would like a face-to-face visit to further discuss surgery, risks, and benefits. I agree and feel this is necessary and appropriate. His wife will need to accompany him in to the clinic visit. We will work to schedule this.     Marlys Luna MD    Total time of telephone call: 20 minutes; we will plan to see the patient in a face-to-face clinic visit.    "

## 2020-05-26 NOTE — PROGRESS NOTES
"Nader Alfaro is a 64 year old male who is being evaluated via a billable telephone visit.      The patient has been notified of following:     \"This telephone visit will be conducted via a call between you and your physician/provider. We have found that certain health care needs can be provided without the need for a physical exam.  This service lets us provide the care you need with a short phone conversation.  If a prescription is necessary we can send it directly to your pharmacy.  If lab work is needed we can place an order for that and you can then stop by our lab to have the test done at a later time.    Telephone visits are billed at different rates depending on your insurance coverage. During this emergency period, for some insurers they may be billed the same as an in-person visit.  Please reach out to your insurance provider with any questions.    If during the course of the call the physician/provider feels a telephone visit is not appropriate, you will not be charged for this service.\"    Patient has given verbal consent for Telephone visit?  Yes home phone number [phone]    What phone number would you like to be contacted at? Yes phone  available [phone] option 1 then 0    How would you like to obtain your AVS? Mailed   "

## 2020-05-29 ENCOUNTER — TELEPHONE (OUTPATIENT)
Dept: OTHER | Facility: CLINIC | Age: 65
End: 2020-05-29

## 2020-05-29 NOTE — TELEPHONE ENCOUNTER
"Per LOV 5/26/20 with Dr. Luna  \"pt and his wife would like a face-to-face visit to further discuss surgery, risks, and benefits. I agree and feel this is necessary and appropriate. His wife will need to accompany him in to the clinic visit. We will work to schedule this.\"    Pt is scheduled for follow up 6/2/20.       JULIETA Kemp, RN  [facility] [place]     "

## 2020-05-29 NOTE — PATIENT INSTRUCTIONS
Follow up office visit with Dr. Luna on 6/2/20 at 10:30 a.m.    If you have questions or concerns, please call us at [phone].     JULIETA Kemp, RN  [facility]

## 2020-06-02 ENCOUNTER — OFFICE VISIT (OUTPATIENT)
Dept: OTHER | Facility: CLINIC | Age: 65
End: 2020-06-02
Attending: SURGERY
Payer: COMMERCIAL

## 2020-06-02 VITALS — SYSTOLIC BLOOD PRESSURE: 116 MMHG | HEART RATE: 69 BPM | DIASTOLIC BLOOD PRESSURE: 68 MMHG

## 2020-06-02 DIAGNOSIS — I72.3 ILIAC ARTERY ANEURYSM, RIGHT (H): Primary | ICD-10-CM

## 2020-06-02 PROCEDURE — 99213 OFFICE O/P EST LOW 20 MIN: CPT | Mod: ZP | Performed by: SURGERY

## 2020-06-02 PROCEDURE — G0463 HOSPITAL OUTPT CLINIC VISIT: HCPCS

## 2020-06-02 NOTE — PROGRESS NOTES
"Nader Alfaro is a 64 year old male who presents for:  Chief Complaint   Patient presents with     RECHECK     In clinic per Dr. Luna- follow up        Vitals:    Vitals:    06/02/20 1027   BP: 116/68   BP Location: Left arm   Patient Position: Chair   Cuff Size: Adult Regular   Pulse: 69       BMI:  Estimated body mass index is 25.06 kg/m  as calculated from the following:    Height as of 5/26/20: 5' 2\" (1.575 m).    Weight as of 5/26/20: 137 lb (62.1 kg).    Pain Score:  Data Unavailable        Mady Beasley MA    "

## 2020-06-02 NOTE — PROGRESS NOTES
"Vascular Surgery Progress Note     Date: June 2, 2020     Reason for Visit:  In-person review of imaging results and counseling; please see additionally telehealth encounter from 5/26/20      Current Outpatient Medications:      blood glucose (NO BRAND SPECIFIED) lancets standard, Use to test blood sugar daily or as directed., Disp: 100 each, Rfl: 3     blood glucose (NO BRAND SPECIFIED) test strip, Use to test blood sugar daily or as directed., Disp: 100 strip, Rfl: 3     blood glucose monitoring (NO BRAND SPECIFIED) meter device kit, Use to test blood sugar daily or as directed., Disp: 1 kit, Rfl: 1     cetirizine (ZYRTEC) 10 MG tablet, Take 10 mg by mouth daily, Disp: , Rfl:      glipiZIDE (GLUCOTROL XL) 5 MG 24 hr tablet, Take 1 tablet (5 mg) by mouth daily, Disp: 90 tablet, Rfl: 3     lisinopril (PRINIVIL/ZESTRIL) 5 MG tablet, Take 1 tablet (5 mg) by mouth daily, Disp: 90 tablet, Rfl: 3     lovastatin (MEVACOR) 40 MG tablet, TAKE ONE TABLET BY MOUTH AT BEDTIME, Disp: 90 tablet, Rfl: 3     metFORMIN (GLUCOPHAGE) 1000 MG tablet, TAKE ONE TABLET BY MOUTH TWICE A DAY WITH MEALS (BREAKFAST AND DINNER), Disp: 180 tablet, Rfl: 3     omeprazole (PRILOSEC) 40 MG DR capsule, TAKE ONE CAPSULE BY MOUTH EVERY DAY; TAKE 30 TO 60 MINUTES BEFORE A MEAL, Disp: 90 capsule, Rfl: 3     Physical Exam       BP: 116/68 Pulse: 69            Vital Signs with Ranges  Pulse:  [69] 69  BP: (116)/(68) 116/68  0 lbs 0 oz    Constitutional: cooperative, no apparent distress, sitting comfortably in chair. Accompanied by wife. Telephone Hmong  used.   Musculoskeletal: grossly normal and symmetric ROM and strength in BL extremities. Thin.   Neurologic: Awake, alert, oriented to name, place, time, and situation    Imaging:  I have reviewed the following imaging studies:  CTA abd/pelvis (5/20/20): \"Abdominal aorta: There is scattered calcified plaque in the abdominal aorta. This does not contribute to a significant stenosis. There is " "mild ectasia of the distal abdominal aorta which measures approximately 2.5 x 2.6 cm. There is a moderate stenosis in the proximal celiac trunk. The superior mesenteric artery and inferior mesenteric artery are patent without significant stenoses. The renal arteries are patent without significant stenoses. Iliac arteries: There is a 3.0 cm right common iliac artery aneurysm. The left common iliac artery measures approximately 14 mm in diameter. The external iliac arteries and proximal femoral arteries are patent without significant stenoses.\" On my review of the previous films, this appears to be relatively stable in size and morphology, with approximately 1-2 mm of enlargement from 4/5/19.           Assessment & Plan   Mr. Alfaro is a 64 year old male with history of HTN, DL, and well-controlled DM, who presents for follow-up evaluation of a right common iliac artery aneurysm. His right ROSINA is approximately 3.0 cm in greatest diameter, and aneurysmal enlargement appears to extend to both the aortic bifurcation and the iliac bifurcation.      During this visit I spent extensive time with Mr. Alfaro and his wife reviewing the imaging and the options for repair.  As his aneurysm is approximately 3.0 cm and has not had rapid enlargement, we will plan on repeat CTA imaging in approximately 6 months.  I discussed with him both open and endovascular repair options, along with a limited discussion of the risks and benefits of each.  At this point, especially given his relatively young age and otherwise excellent health, I would plan to wait until the aneurysm reaches approximately 3.5 cm in size prior to repair.  I discussed this with the patient and his wife and they understand and are in agreement.      Marlys Luna    "

## 2020-06-02 NOTE — PATIENT INSTRUCTIONS
Follow up in 6 months with imaging and in person office visit with Dr. Luna.     If you have questions or concerns please call us at [phone].    VALDO KempN, RN  [facility] [place]

## 2020-06-08 ENCOUNTER — TELEPHONE (OUTPATIENT)
Dept: PEDIATRICS | Facility: CLINIC | Age: 65
End: 2020-06-08

## 2020-06-08 NOTE — TELEPHONE ENCOUNTER
Medication Question or Refill  Who is calling: pt's daughter, May  What medication are you calling about (include dose and sig)?: metformin  Controlled Substance Agreement on file: No  Who prescribed the medication?: Dr Ford  Do you need a refill? No  When did you use the medication last? n/a  Patient offered an appointment? No  Do you have any questions or concerns?  Yes Requested Pharmacy: n/a  Okay to leave a detailed message?: No Please call May at [phone].  Pt asking if the med he is taking is the recalled med?  Please advise.   Thank you.  farooq

## 2020-06-09 NOTE — TELEPHONE ENCOUNTER
CTC is in file to talk with daughter(May). Pt is currently taking metformin 1000 mg bid.     Checked with [facility] pharmacy, I was told that there is no official recall on Metformin yet. Called daughter back and updated them. Advised to contact pharmacy to get updated info in the future. Daughter agrees. No other questions/concerns from pt.     Himanshu, RN  Triage Nurse

## 2020-08-14 ENCOUNTER — OFFICE VISIT (OUTPATIENT)
Dept: PEDIATRICS | Facility: CLINIC | Age: 65
End: 2020-08-14
Payer: COMMERCIAL

## 2020-08-14 VITALS
HEIGHT: 62 IN | OXYGEN SATURATION: 97 % | SYSTOLIC BLOOD PRESSURE: 102 MMHG | HEART RATE: 69 BPM | WEIGHT: 140 LBS | DIASTOLIC BLOOD PRESSURE: 70 MMHG | RESPIRATION RATE: 16 BRPM | TEMPERATURE: 98.3 F | BODY MASS INDEX: 25.76 KG/M2

## 2020-08-14 DIAGNOSIS — I72.3 ILIAC ARTERY ANEURYSM, RIGHT (H): ICD-10-CM

## 2020-08-14 DIAGNOSIS — I10 ESSENTIAL HYPERTENSION: ICD-10-CM

## 2020-08-14 DIAGNOSIS — E04.1 THYROID NODULE: ICD-10-CM

## 2020-08-14 DIAGNOSIS — E11.9 TYPE 2 DIABETES MELLITUS WITHOUT COMPLICATION, WITHOUT LONG-TERM CURRENT USE OF INSULIN (H): Primary | ICD-10-CM

## 2020-08-14 LAB — HBA1C MFR BLD: 7.3 % (ref 0–5.6)

## 2020-08-14 PROCEDURE — 84439 ASSAY OF FREE THYROXINE: CPT | Performed by: INTERNAL MEDICINE

## 2020-08-14 PROCEDURE — 83036 HEMOGLOBIN GLYCOSYLATED A1C: CPT | Performed by: INTERNAL MEDICINE

## 2020-08-14 PROCEDURE — 84443 ASSAY THYROID STIM HORMONE: CPT | Performed by: INTERNAL MEDICINE

## 2020-08-14 PROCEDURE — 99214 OFFICE O/P EST MOD 30 MIN: CPT | Performed by: INTERNAL MEDICINE

## 2020-08-14 PROCEDURE — 36415 COLL VENOUS BLD VENIPUNCTURE: CPT | Performed by: INTERNAL MEDICINE

## 2020-08-14 ASSESSMENT — MIFFLIN-ST. JEOR: SCORE: 1311.29

## 2020-08-14 NOTE — PATIENT INSTRUCTIONS
Check labwork today   I will contact you with the results    No changes with your medications today

## 2020-08-14 NOTE — PROGRESS NOTES
Subjective     Nader Alfaro is a 65 year old male who presents to clinic today for the following health issues:    HPI       Diabetes Follow-up    How often are you checking your blood sugar? Two times daily  Blood sugar testing frequency justification:  unknown   What time of day are you checking your blood sugars (select all that apply)?  Before and after meals  Have you had any blood sugars above 200?  Yes after meals   Have you had any blood sugars below 70?  No    What symptoms do you notice when your blood sugar is low?  None    What concerns do you have today about your diabetes? None     Do you have any of these symptoms? (Select all that apply)  No numbness or tingling in feet.  No redness, sores or blisters on feet.  No complaints of excessive thirst.  No reports of blurry vision.  No significant changes to weight.    Have you had a diabetic eye exam in the last 12 months? No    Hx of gastric cancer.  Had routine surveillance CT done in May.  Noted iliac aneurysm, has seen vascular.  Also noted 2 x 1 cm thyroid nodule. US recommended.   Is having some itching on the neck, no other sx related to thyroid.     Reviewed past thyroid labs:  Lab Results   Component Value Date    TSH 4.80 05/09/2019    TSH 2.20 01/29/2018     Lab Results   Component Value Date    T4 0.96 05/09/2019    T4 0.99 01/14/2014     HTN. BP is well controlled.     BP Readings from Last 2 Encounters:   08/14/20 102/70   06/02/20 116/68     Hemoglobin A1C (%)   Date Value   02/07/2020 6.6 (H)   05/09/2019 6.9 (H)     LDL Cholesterol Calculated (mg/dL)   Date Value   02/07/2020 93   05/09/2019 113 (H)       Reviewed and updated as needed this visit by Provider  Tobacco  Allergies  Meds  Problems  Med Hx  Surg Hx  Fam Hx         Review of Systems   Constitutional, HEENT, cardiovascular, pulmonary, gi and gu systems are negative, except as otherwise noted.      Objective    /70 (BP Location: Right arm, Patient Position: Sitting,  "Cuff Size: Adult Regular)   Pulse 69   Temp 98.3  F (36.8  C) (Tympanic)   Resp 16   Ht 1.578 m (5' 2.13\")   Wt 63.5 kg (140 lb)   SpO2 97%   BMI 25.50 kg/m    Body mass index is 25.5 kg/m .  Physical Exam   GEN: No distress  SKIN: no rashes, no suspicious lesions  HEENT: PERRL. No icterus. TM's clear danette.  NECK: supple. No LAD.  LUNGS: CTA danette. No R, R, W.  CV: RRR. No M. Pulses 2+ danette radial.   ABD: BS+. S, ND, NT.  EXTR: no LE edema  PSYCH: Normal affect. Well groomed. Good eye contact.           Assessment & Plan       ICD-10-CM    1. Type 2 diabetes mellitus without complication, without long-term current use of insulin (H)  E11.9 Hemoglobin A1c   2. Thyroid nodule  E04.1 US Thyroid     US Biopsy Thyroid Fine Needle Aspiration     TSH     T4, free   3. Iliac artery aneurysm, right (H)  I72.3    4. Hypertension  I10      Type 2 DM - has been well controlled. CPM unless labs indicate a need to change    Thyroid nodule. Recommend starting w/ US, bx if warranted based on imaging.    Iliac aneurysm - continue w/ vascular f/u    HTN. CPM.     Mazin Ford MD  [facility]  "

## 2020-08-14 NOTE — LETTER
August 17, 2020      Nader Alfaro  [id number] [facility] [id number]        Nader:     Your tests are all complete. The results show:     1. Your TSH (thyroid function) and T4 are in the normal range.     2. Your hemoglobin A1C (long-range glucose control) indicates overall good diabetes control. The goal is to keep your A1C <8%.       Please feel free to contact me if you have any questions or concerns.       Mazin Ford     Resulted Orders   TSH   Result Value Ref Range    TSH 3.81 0.40 - 4.00 mU/L   T4, free   Result Value Ref Range    T4 Free 0.84 0.76 - 1.46 ng/dL   Hemoglobin A1c   Result Value Ref Range    Hemoglobin A1C 7.3 (H) 0 - 5.6 %      Comment:      Normal <5.7% Prediabetes 5.7-6.4%  Diabetes 6.5% or higher - adopted from ADA   consensus guidelines.

## 2020-08-15 LAB
T4 FREE SERPL-MCNC: 0.84 NG/DL (ref 0.76–1.46)
TSH SERPL DL<=0.005 MIU/L-ACNC: 3.81 MU/L (ref 0.4–4)

## 2020-08-17 DIAGNOSIS — Z11.59 ENCOUNTER FOR SCREENING FOR OTHER VIRAL DISEASES: Primary | ICD-10-CM

## 2020-08-21 DIAGNOSIS — Z11.59 ENCOUNTER FOR SCREENING FOR OTHER VIRAL DISEASES: ICD-10-CM

## 2020-08-21 PROCEDURE — U0003 INFECTIOUS AGENT DETECTION BY NUCLEIC ACID (DNA OR RNA); SEVERE ACUTE RESPIRATORY SYNDROME CORONAVIRUS 2 (SARS-COV-2) (CORONAVIRUS DISEASE [COVID-19]), AMPLIFIED PROBE TECHNIQUE, MAKING USE OF HIGH THROUGHPUT TECHNOLOGIES AS DESCRIBED BY CMS-2020-01-R: HCPCS | Performed by: INTERNAL MEDICINE

## 2020-08-22 LAB
SARS-COV-2 RNA SPEC QL NAA+PROBE: NORMAL
SPECIMEN SOURCE: NORMAL

## 2020-08-23 LAB
LABORATORY COMMENT REPORT: NORMAL
SARS-COV-2 RNA SPEC QL NAA+PROBE: NEGATIVE
SPECIMEN SOURCE: NORMAL

## 2020-08-25 ENCOUNTER — HOSPITAL ENCOUNTER (OUTPATIENT)
Dept: ULTRASOUND IMAGING | Facility: CLINIC | Age: 65
End: 2020-08-25
Attending: INTERNAL MEDICINE
Payer: COMMERCIAL

## 2020-08-25 DIAGNOSIS — E04.1 THYROID NODULE: ICD-10-CM

## 2020-08-25 PROCEDURE — 10005 FNA BX W/US GDN 1ST LES: CPT

## 2020-08-25 PROCEDURE — 88173 CYTOPATH EVAL FNA REPORT: CPT | Performed by: RADIOLOGY

## 2020-08-25 PROCEDURE — 76536 US EXAM OF HEAD AND NECK: CPT

## 2020-08-25 PROCEDURE — 00000102 ZZHCL STATISTIC CYTO WRIGHT STAIN TC: Performed by: RADIOLOGY

## 2020-08-25 PROCEDURE — 88173 CYTOPATH EVAL FNA REPORT: CPT | Mod: 26 | Performed by: RADIOLOGY

## 2020-08-25 PROCEDURE — 25000125 ZZHC RX 250: Performed by: RADIOLOGY

## 2020-08-25 RX ORDER — LIDOCAINE HYDROCHLORIDE 10 MG/ML
10 INJECTION, SOLUTION EPIDURAL; INFILTRATION; INTRACAUDAL; PERINEURAL ONCE
Status: COMPLETED | OUTPATIENT
Start: 2020-08-25 | End: 2020-08-25

## 2020-08-25 RX ADMIN — LIDOCAINE HYDROCHLORIDE 10 ML: 10 INJECTION, SOLUTION EPIDURAL; INFILTRATION; INTRACAUDAL; PERINEURAL at 09:42

## 2020-08-25 NOTE — PROGRESS NOTES
Pt here for Left Thyroid Biopsy FNA.  Consent and procedure performed by Dr. Reyes.  Bandaid applied.  Site WDL.  Discharge instructions reviewed.  Slides in alcohol.  Slides prepared dry.  Aspirate in RNA/DNA media.  Pt discharged no complications noted.

## 2020-08-25 NOTE — PROCEDURES
[facility]    Procedure: Thyroid FNA    Date/Time: 8/25/2020 10:44 AM  Performed by: Aramis Reyes MD  Authorized by: Aramis Reyes MD     UNIVERSAL PROTOCOL   Site Marked: NA  Prior Images Obtained and Reviewed:  Yes  Required items: Required blood products, implants, devices and special equipment available    Patient identity confirmed:  Verbally with patient, arm band, provided demographic data and hospital-assigned identification number  Patient was reevaluated immediately before administering moderate or deep sedation or anesthesia  Confirmation Checklist:  Patient's identity using two indicators, relevant allergies, procedure was appropriate and matched the consent or emergent situation and correct equipment/implants were available  Time out: Immediately prior to the procedure a time out was called    Universal Protocol: the Joint Commission Universal Protocol was followed    Preparation: Patient was prepped and draped in usual sterile fashion    ESBL (mL):  0         ANESTHESIA    Anesthesia: Local infiltration  Local Anesthetic:  Lidocaine 1% without epinephrine      SEDATION    Patient Sedated: No    See dictated procedure note for full details.  Findings: Thyroid FNA of isthmus/left lobe.    Specimens: fine needle aspirate for cytological analysis    Complications: None    Condition: Stable    PROCEDURE   Patient Tolerance:  Patient tolerated the procedure well with no immediate complications    Length of time physician/provider present for 1:1 monitoring during sedation: 0

## 2020-08-26 ENCOUNTER — TELEPHONE (OUTPATIENT)
Dept: PEDIATRICS | Facility: CLINIC | Age: 65
End: 2020-08-26

## 2020-08-26 DIAGNOSIS — E04.1 THYROID NODULE: Primary | ICD-10-CM

## 2020-08-26 LAB — COPATH REPORT: NORMAL

## 2020-08-27 NOTE — TELEPHONE ENCOUNTER
"Called pt back at [phone] with [facility] . Able to speak with spouse(May). Spouse wasn't there when  called pt to go over the US result. So, she wanted to know the result.     Explained to her that the biopsy showed \"Atypia of undetermined significance\"(there are some abnormal/atypical cells in the biopsy sample but not enough to diagnose a cancer). So, he need to f/u with endo.     Spouse agrees with the plan & appreciates the call back. No other questions/concerns from spouse.     Himanshu, RN  Triage Nurse          "

## 2020-08-27 NOTE — TELEPHONE ENCOUNTER
Patient is scheduled with Dr. Baker 09/28/20 at 1:00 pm.  Called patient via [employer]  with appointment details.  Spoke appointment and check in location.  Patient and wife have some additional questions regarding results, and would like call back.  Routed message to [facility] for follow up.     Юлия Contreras

## 2020-09-28 ENCOUNTER — VIRTUAL VISIT (OUTPATIENT)
Dept: ENDOCRINOLOGY | Facility: CLINIC | Age: 65
End: 2020-09-28
Attending: INTERNAL MEDICINE
Payer: COMMERCIAL

## 2020-09-28 DIAGNOSIS — E04.1 THYROID NODULE: Primary | ICD-10-CM

## 2020-09-28 PROCEDURE — 99203 OFFICE O/P NEW LOW 30 MIN: CPT | Mod: 95 | Performed by: INTERNAL MEDICINE

## 2020-09-28 NOTE — PROGRESS NOTES
"This is a telephone encounter.    Start time: 1:10    End time: 1:51    More than 10 min spent reviewing chart, labs, results, imaging studies and in patient communication.    In the setting of COVID-19 outbreak patients visits are transitioned to phone visits/ virtual visits as per system and leadership guidelines.  I have personally reviewed data including notes, lab tests. I have reviewed and interpreted images personally.  This encounter is potentially equivalent to NEW 4 level (28129) clinic visit.    The patient has been notified of following:      \"This telephone visit will be conducted via a call between you and your physician/provider. We have found that certain health care needs can be provided without the need for a physical exam.  This service lets us provide the care you need with a short phone conversation.  If a prescription is necessary we can send it directly to your pharmacy.  If lab work is needed we can place an order for that and you can then stop by our lab to have the test done at a later time.     We will bill your insurance company for this service.  Please check with your medical insurance if this type of visit is covered. You may be responsible for the cost of this type of visit if insurance coverage is denied.  The typical cost is $30 (10min), $59 (11-20min) and $85 (21-30min).  Most often these visits are shorter than 10 minutes. With new updates with corona virus patient might be billed as clinic visit.     If during the course of the call the physician/provider feels a telephone visit is not appropriate, you will not be charged for this service.\"      Past medical history, social history, family history, allergy and medications were reviewed and updated as appropriate.  Reviewed all pertinent labs, notes and imaging studies as appropriate.    Patient verbally consented to phone visit today: yes.    Disclaimer: This note consists of symbols derived from keyboarding, dictation and/or " voice recognition software. As a result, there may be errors in the script that have gone undetected. Please consider this when interpreting information found in this chart.    ROS neg expect noted in notes.  Patient feels well at this time and denies any tachycardia, palpitations, heat intolerance, tremor, insomnia, diarrhea, or unexplained weight loss.  Patient also denies  cold intolerance, constipation, or unexplained weight gain.   Name: Nader Alfaro  Seen in consultation with Mazin Ford for thyroid nodule.   HPI:  Nader Alfaro is a 65 year old male who presents for the evaluation of thyroid nodule.   has a past medical history of Essential hypertension, Gastric cancer (H) (8/8/2012), Gastric ulcer, Gastro-oesophageal reflux disease, GERD (gastroesophageal reflux disease), Hyperlipidemia, and Type 2 diabetes mellitus (H).    H/o Stage 3 gastric cancer- followed by oncology.  Had CT chest with Oncology in 5/2020 which showed incidental finding of left tyroid nodule.  Thyroid US 8/2020: Nodule 1: 1.3 x 0.8 x 1.2 cm nodule in the medial inferior left thyroid lobe.   S/p FNA 8/2020: Thyroid, left nodule c/w   Atypia of undetermined significance.    No FH of thyroid cancer.  No history of radiation.  No compressive s/s.  Thyroid labs in acceptable range.  She is not on thyroid hormone replacement.  No other major risk factors.    FH of thyroid problem: no  History of thyroid dysfunction: h/o subclinical hypothyroidism- was not on medication.  Patient feels well at this time and denies any tachycardia, palpitations, heat intolerance, tremor, insomnia, diarrhea, or unexplained weight loss.  Patient also denies  cold intolerance, constipation, or unexplained weight gain.     PMH/PSH:  Past Medical History:   Diagnosis Date     Essential hypertension      Gastric cancer (H) 8/8/2012     Gastric ulcer      Gastro-oesophageal reflux disease      GERD (gastroesophageal reflux disease)      Hyperlipidemia      Type 2  diabetes mellitus (H)      Past Surgical History:   Procedure Laterality Date     Carpel tunnel surgery Right      COLONOSCOPY  2012    Procedure: COLONOSCOPY;  COLONOSCOPY & ESOPHAGOSCOPY, GASTROSCOPY, DUODENOSCOPY (EGD) and cold bx pre-pyloric large ulcer/mass/WW;  Surgeon: Endy Radford MD;  Location:  GI     ESOPHAGOSCOPY, GASTROSCOPY, DUODENOSCOPY (EGD), COMBINED       GASTRECTOMY  2012    Procedure: GASTRECTOMY;  Partial GASTRECTOMY  with lymph node dissection;  Surgeon: Jonathan Frausto MD;  Location: [facility] OR     Family Hx:  Family History   Problem Relation Age of Onset     C.A.D. No family hx of      Diabetes No family hx of      Hypertension No family hx of      Cerebrovascular Disease No family hx of      Breast Cancer No family hx of      Cancer - colorectal No family hx of      Prostate Cancer No family hx of            Social Hx:  Social History     Socioeconomic History     Marital status:      Spouse name: Not on file     Number of children: Not on file     Years of education: Not on file     Highest education level: Not on file   Occupational History     Not on file   Social Needs     Financial resource strain: Not on file     Food insecurity     Worry: Not on file     Inability: Not on file     Transportation needs     Medical: Not on file     Non-medical: Not on file   Tobacco Use     Smoking status: Former Smoker     Packs/day: 0.10     Years: 15.00     Pack years: 1.50     Types: Cigarettes     Last attempt to quit: 1990     Years since quittin.7     Smokeless tobacco: Never Used   Substance and Sexual Activity     Alcohol use: Not Currently     Alcohol/week: 0.0 standard drinks     Drug use: No     Sexual activity: Not Currently   Lifestyle     Physical activity     Days per week: Not on file     Minutes per session: Not on file     Stress: Not on file   Relationships     Social connections     Talks on phone: Not on file     Gets together: Not on file      Attends Restorationist service: Not on file     Active member of club or organization: Not on file     Attends meetings of clubs or organizations: Not on file     Relationship status: Not on file     Intimate partner violence     Fear of current or ex partner: Not on file     Emotionally abused: Not on file     Physically abused: Not on file     Forced sexual activity: Not on file   Other Topics Concern     Parent/sibling w/ CABG, MI or angioplasty before 65F 55M? Not Asked   Social History Narrative     Not on file          MEDICATIONS:  has a current medication list which includes the following prescription(s): blood glucose, blood glucose, blood glucose monitoring, cetirizine, glipizide, lisinopril, lovastatin, metformin, and omeprazole.    Review of Systems  10 point ROS neg other than the symptoms noted above in the HPI.    Physical Exam   VS: There were no vitals taken for this visit.    LABS:  TFTs:  ENDO THYROID LABS-[facility] Latest Ref Rng & Units 8/14/2020 5/9/2019   TSH 0.40 - 4.00 mU/L 3.81 4.80 (H)   T4 FREE 0.76 - 1.46 ng/dL 0.84 0.96     ENDO THYROID LABS-[facility] Latest Ref Rng & Units 1/29/2018 5/13/2016   TSH 0.40 - 4.00 mU/L 2.20 3.68   T4 FREE 0.76 - 1.46 ng/dL       Thyroid Ultrasound:  ULTRASOUND THYROID August 25, 2020 10:03 AM     CLINICAL HISTORY: 2 x 1 cm thyroid nodule. Start with US, schedule /  do bx if clinically indicated. Thyroid nodule.     TECHNIQUE: Thyroid ultrasound.      COMPARISON: CT 4/5/2019.      FINDINGS:  RIGHT lobe: 4.5 x 1.4 x 1.9 cm. Homogeneous echotexture.  Isthmus: 3 mm.  LEFT lobe: 4.1 x 1.8 x 1.2 cm.      NECK: No cervical lymphadenopathy.     NODULES:     Nodule 1: 1.3 x 0.8 x 1.2 cm nodule in the medial inferior left thyroid lobe.   Composition: Mixed cystic and solid, 1 point   Echogenicity: Hypoechoic, 2 points   Shape: Wider-than-tall, 0 points   Margin: Smooth, 0 points   Echogenic Foci: Punctate echoic foci, 3 points   Point Total: 4-6 points. TI-RADS 4. If 1.5 cm or  larger, recommend  FNA; if 1 cm or larger, follow up US (annually for 5 years).                                                                     IMPRESSION:  1.3 cm TI-RADS category 4 left thyroid nodule. Subsequent biopsy is  scheduled.       FNA 8/2020:  CYTOLOGIC INTERPRETATION:      Thyroid, left nodule, ultrasound guided fine needle aspiration: Atypia   of undetermined significance.    Atypia of undetermined significance   The Foley implied risk of malignancy and recommended clinical   management:   Atypia of undetermined significance has a 10-30% risk of malignancy,   recommend repeat FNA in 4-6 weeks,   molecular testing or lobectomy     Specimen Adequacy: Satisfactory for evaluation but limited by:   ...scant follicular cells present.     All pertinent notes, labs, and images personally reviewed by me.     A/P  Mr.Wang BECKI Alfaro is a 65 year old here for the evaluation of thyroid nodule:    #1. Thyroid Nodule:  Thyroid nodules are common and are frequently benign. Data suggest that the prevalence of palpable thyroid nodules is 3% to 7% in [place]; the prevalence is as high as 50% based on ultrasonography (US) or autopsy data. All patients with a palpable thyroid nodule, however, should undergo US examination. US-guided FNA (US-FNA) is recommended for nodules ?10 mm; US-FNA is suggested for nodules <10 mm only if clinical information or US features are suspicious.  The frequency of thyroid nodules in general population was discussed. Also discussed possibility of malignancy, potential for thyroid autonomy.   Thyroid US 8/2020: Nodule 1: 1.3 x 0.8 x 1.2 cm nodule in the medial inferior left thyroid lobe.   S/p FNA 8/2020: Thyroid, left nodule c/w   Atypia of undetermined significance.  No FH of thyroid cancer.  No history of radiation.  No compressive s/s.  Thyroid labs in acceptable range.  She is not on thyroid hormone replacement.  No other major risk factors.  Plan:  Discussed diagnosis,  pathophysiology, management and treatment options of condition with pt.  Discussed AUS in general: Discussed repeat biopsy, molecular marker testing and surgical option.  Recommend repeat biopsy in next few weeks.  Discussed possible outcomes of biopsy including possible benign, possible malignancy and possible AUS. If AUS indication for molecular marker testing.      Causes of thyroid Nodules: Benign (Multinodular goiter, Hashimoto s thyroiditis, Simple or hemorrhagic cysts, Follicular adenomas, Subacute thyroiditis) or Malignant(Papillary carcinoma, Follicular carcinoma, Hürthle cell carcinoma, Medullary carcinoma, Anaplastic carcinoma, Primary thyroid lymphoma, or Metastatic malignant lesion).    MultiNodule:  The risk of cancer is not significantly higher in palpable solitary thyroid nodules than in multinodular lesions or in nodules in diffuse goiters. In multinodular thyroid glands, the cytologic sampling should be focused on lesions characterized by suspicious US features rather than on larger or clinically dominant nodules.    Cyst:  Most complex thyroid nodules with a dominant fluid component are benign. USFNA, however, should always be performed because the rare papillary thyroid carcinoma (PTC) can be cystic. An unsatisfactory (nondiagnostic) specimen usually results from a cystic nodule that yields few or no follicular cells. Reaspiration yields satisfactory results in 50% of cases.    All questions were answered.  The patient indicates understanding of the above issues and agrees with the plan set forth.    Follow-up:  After FNA.    Maria Del Rosario Baker MD  Endocrinology   [facility]    Cc: Mazin Ford    Addendum to above note and clinic visit:    Labs reviewed.    See result note/telephone encounter.

## 2020-09-28 NOTE — LETTER
"    9/28/2020         RE: Nader Alfaro  [address] [facility] [place] [address]        Dear Colleague,    Thank you [facility] referring your patient[facility] Please see a copy of my visit note below.    This is a telephone encounter.    Start time: 1:10    End time: 1:51    More than 10 min spent reviewing chart, labs, results, imaging studies and in patient communication.    In the setting of COVID-19 outbreak patients visits are transitioned to phone visits/ virtual visits as per system and leadership guidelines.  I have personally reviewed data including notes, lab tests. I have reviewed and interpreted images personally.  This encounter is potentially equivalent to NEW 4 level ([address]99) clinic visit.    The patient has been notified of following:      \"This telephone visit will be conducted via a call between you and your physician/provider. We have found that certain health care needs can be provided without the need for a physical exam.  This service lets us provide the care you need with a short phone conversation.  If a prescription is necessary we can send it directly to your pharmacy.  If lab work is needed we can place an order for that and you can then stop by our lab to have the test done at a later time.     We will bill your insurance company for this service.  Please check with your medical insurance if this type of visit is covered. You may be responsible for the cost of this type of visit if insurance coverage is denied.  The typical cost is $30 (10min), $59 (11-20min) and $85 (21-30min).  Most often these visits are shorter than 10 minutes. With new updates with corona virus patient might be billed as clinic visit.     If during the course of the call the physician/provider feels a telephone visit is not appropriate, you will not be charged for this service.\"      Past medical history, social history, family history, allergy and medications were reviewed and updated " as appropriate.  Reviewed all pertinent labs, notes and imaging studies as appropriate.    Patient verbally consented to phone visit today: yes.    Disclaimer: This note consists of symbols derived from keyboarding, dictation and/or voice recognition software. As a result, there may be errors in the script that have gone undetected. Please consider this when interpreting information found in this chart.    ROS neg expect noted in notes.  Patient feels well at this time and denies any tachycardia, palpitations, heat intolerance, tremor, insomnia, diarrhea, or unexplained weight loss.  Patient also denies  cold intolerance, constipation, or unexplained weight gain.   Name: Nader Alfaro  Seen in consultation with Mazin Ford for thyroid nodule.   HPI:  Nader Alfaro is a 65 year old male who presents for the evaluation of thyroid nodule.   has a past medical history of Essential hypertension, Gastric cancer (H) (8/8/2012), Gastric ulcer, Gastro-oesophageal reflux disease, GERD (gastroesophageal reflux disease), Hyperlipidemia, and Type 2 diabetes mellitus (H).    H/o Stage 3 gastric cancer- followed by oncology.  Had CT chest with Oncology in 5/2020 which showed incidental finding of left tyroid nodule.  Thyroid US 8/2020: Nodule 1: 1.3 x 0.8 x 1.2 cm nodule in the medial inferior left thyroid lobe.   S/p FNA 8/2020: Thyroid, left nodule c/w   Atypia of undetermined significance.    No FH of thyroid cancer.  No history of radiation.  No compressive s/s.  Thyroid labs in acceptable range.  She is not on thyroid hormone replacement.  No other major risk factors.    FH of thyroid problem: no  History of thyroid dysfunction: h/o subclinical hypothyroidism- was not on medication.  Patient feels well at this time and denies any tachycardia, palpitations, heat intolerance, tremor, insomnia, diarrhea, or unexplained weight loss.  Patient also denies  cold intolerance, constipation, or unexplained weight gain.     PMH/PSH:  Past  Medical History:   Diagnosis Date     Essential hypertension      Gastric cancer (H) 2012     Gastric ulcer      Gastro-oesophageal reflux disease      GERD (gastroesophageal reflux disease)      Hyperlipidemia      Type 2 diabetes mellitus (H)      Past Surgical History:   Procedure Laterality Date     Carpel tunnel surgery Right      COLONOSCOPY  2012    Procedure: COLONOSCOPY;  COLONOSCOPY & ESOPHAGOSCOPY, GASTROSCOPY, DUODENOSCOPY (EGD) and cold bx pre-pyloric large ulcer/mass/WW;  Surgeon: Endy Radford MD;  Location:  GI     ESOPHAGOSCOPY, GASTROSCOPY, DUODENOSCOPY (EGD), COMBINED       GASTRECTOMY  2012    Procedure: GASTRECTOMY;  Partial GASTRECTOMY  with lymph node dissection;  Surgeon: Jonathan Frausto MD;  Location: [facility] OR     Family Hx:  Family History   Problem Relation Age of Onset     C.A.D. No family hx of      Diabetes No family hx of      Hypertension No family hx of      Cerebrovascular Disease No family hx of      Breast Cancer No family hx of      Cancer - colorectal No family hx of      Prostate Cancer No family hx of            Social Hx:  Social History     Socioeconomic History     Marital status:      Spouse name: Not on file     Number of children: Not on file     Years of education: Not on file     Highest education level: Not on file   Occupational History     Not on file   Social Needs     Financial resource strain: Not on file     Food insecurity     Worry: Not on file     Inability: Not on file     Transportation needs     Medical: Not on file     Non-medical: Not on file   Tobacco Use     Smoking status: Former Smoker     Packs/day: 0.10     Years: 15.00     Pack years: 1.50     Types: Cigarettes     Last attempt to quit: 1990     Years since quittin.7     Smokeless tobacco: Never Used   Substance and Sexual Activity     Alcohol use: Not Currently     Alcohol/week: 0.0 standard drinks     Drug use: No     Sexual activity: Not Currently    Lifestyle     Physical activity     Days per week: Not on file     Minutes per session: Not on file     Stress: Not on file   Relationships     Social connections     Talks on phone: Not on file     Gets together: Not on file     Attends Voodoo service: Not on file     Active member of club or organization: Not on file     Attends meetings of clubs or organizations: Not on file     Relationship status: Not on file     Intimate partner violence     Fear of current or ex partner: Not on file     Emotionally abused: Not on file     Physically abused: Not on file     Forced sexual activity: Not on file   Other Topics Concern     Parent/sibling w/ CABG, MI or angioplasty before 65F 55M? Not Asked   Social History Narrative     Not on file          MEDICATIONS:  has a current medication list which includes the following prescription(s): blood glucose, blood glucose, blood glucose monitoring, cetirizine, glipizide, lisinopril, lovastatin, metformin, and omeprazole.    Review of Systems  10 point ROS neg other than the symptoms noted above in the HPI.    Physical Exam   VS: There were no vitals taken for this visit.    LABS:  TFTs:  ENDO THYROID LABS-[facility] Latest Ref Rng & Units 8/14/2020 5/9/2019   TSH 0.40 - 4.00 mU/L 3.81 4.80 (H)   T4 FREE 0.76 - 1.46 ng/dL 0.84 0.96     ENDO THYROID LABS-[facility] Latest Ref Rng & Units 1/29/2018 5/13/2016   TSH 0.40 - 4.00 mU/L 2.20 3.68   T4 FREE 0.76 - 1.46 ng/dL       Thyroid Ultrasound:  ULTRASOUND THYROID August 25, 2020 10:03 AM     CLINICAL HISTORY: 2 x 1 cm thyroid nodule. Start with US, schedule /  do bx if clinically indicated. Thyroid nodule.     TECHNIQUE: Thyroid ultrasound.      COMPARISON: CT 4/5/2019.      FINDINGS:  RIGHT lobe: 4.5 x 1.4 x 1.9 cm. Homogeneous echotexture.  Isthmus: 3 mm.  LEFT lobe: 4.1 x 1.8 x 1.2 cm.      NECK: No cervical lymphadenopathy.     NODULES:     Nodule 1: 1.3 x 0.8 x 1.2 cm nodule in the medial inferior left thyroid lobe.   Composition:  Mixed cystic and solid, 1 point   Echogenicity: Hypoechoic, 2 points   Shape: Wider-than-tall, 0 points   Margin: Smooth, 0 points   Echogenic Foci: Punctate echoic foci, 3 points   Point Total: 4-6 points. TI-RADS 4. If 1.5 cm or larger, recommend  FNA; if 1 cm or larger, follow up US (annually for 5 years).                                                                     IMPRESSION:  1.3 cm TI-RADS category 4 left thyroid nodule. Subsequent biopsy is  scheduled.       FNA 8/2020:  CYTOLOGIC INTERPRETATION:      Thyroid, left nodule, ultrasound guided fine needle aspiration: Atypia   of undetermined significance.    Atypia of undetermined significance   The [place] implied risk of malignancy and recommended clinical   management:   Atypia of undetermined significance has a 10-30% risk of malignancy,   recommend repeat FNA in 4-6 weeks,   molecular testing or lobectomy     Specimen Adequacy: Satisfactory for evaluation but limited by:   ...scant follicular cells present.     All pertinent notes, labs, and images personally reviewed by me.     A/P  Mr.Wang BECKI Alfaro is a 65 year old here for the evaluation of thyroid nodule:    #1. Thyroid Nodule:  Thyroid nodules are common and are frequently benign. Data suggest that the prevalence of palpable thyroid nodules is 3% to 7% in [place]; the prevalence is as high as 50% based on ultrasonography (US) or autopsy data. All patients with a palpable thyroid nodule, however, should undergo US examination. US-guided FNA (US-FNA) is recommended for nodules ?10 mm; US-FNA is suggested for nodules <10 mm only if clinical information or US features are suspicious.  The frequency of thyroid nodules in general population was discussed. Also discussed possibility of malignancy, potential for thyroid autonomy.   Thyroid US 8/2020: Nodule 1: 1.3 x 0.8 x 1.2 cm nodule in the medial inferior left thyroid lobe.   S/p FNA 8/2020: Thyroid, left nodule c/w   Atypia of undetermined  significance.  No FH of thyroid cancer.  No history of radiation.  No compressive s/s.  Thyroid labs in acceptable range.  She is not on thyroid hormone replacement.  No other major risk factors.  Plan:  Discussed diagnosis, pathophysiology, management and treatment options of condition with pt.  Discussed AUS in general: Discussed repeat biopsy, molecular marker testing and surgical option.  Recommend repeat biopsy in next few weeks.  Discussed possible outcomes of biopsy including possible benign, possible malignancy and possible AUS. If AUS indication for molecular marker testing.      Causes of thyroid Nodules: Benign (Multinodular goiter, Hashimoto s thyroiditis, Simple or hemorrhagic cysts, Follicular adenomas, Subacute thyroiditis) or Malignant(Papillary carcinoma, Follicular carcinoma, Hürthle cell carcinoma, Medullary carcinoma, Anaplastic carcinoma, Primary thyroid lymphoma, or Metastatic malignant lesion).    MultiNodule:  The risk of cancer is not significantly higher in palpable solitary thyroid nodules than in multinodular lesions or in nodules in diffuse goiters. In multinodular thyroid glands, the cytologic sampling should be focused on lesions characterized by suspicious US features rather than on larger or clinically dominant nodules.    Cyst:  Most complex thyroid nodules with a dominant fluid component are benign. USFNA, however, should always be performed because the rare papillary thyroid carcinoma (PTC) can be cystic. An unsatisfactory (nondiagnostic) specimen usually results from a cystic nodule that yields few or no follicular cells. Reaspiration yields satisfactory results in 50% of cases.    All questions were answered.  The patient indicates understanding of the above issues and agrees with the plan set forth.    Follow-up:  After FNA.    Maria Del Rosario Baker MD  Endocrinology   [facility]    Cc: Mazin Ford    Addendum to above note and clinic visit:    Labs  reviewed.    See result note/telephone encounter.          Again, thank you for allowing me to participate in the care of your patient.        Sincerely,        Maria Del Rosario Baker MD

## 2020-09-28 NOTE — PATIENT INSTRUCTIONS
[facility] & [place] locations   Dr Baker, Endocrinology Department      [facility]   [address]  [place] [address]  Appointment [phone]  Fax: [phone]  [place]: Monday and Tuesday         [facility]   [address]  [place] [address]  Appointment [phone]  Fax: [phone]  [place]: Wednesday and Thursday            Please check the cost coverage and copay with insurance before recommended tests, services and medications (especially if new medications are prescribed).     If ordered, please get blood work done 1 week prior to your next appointment so they will be available to Dr. Baker at your visit.    Recommend repeat biopsy of left thyroid nodule with radiology.  Follow up after above.     [facility] radiology scheduleing   [place]  [phone]   [facility] Radiology scheduling  [place]  [phone]     Please call and schedule the recommended test as discussed in clinic visit.These are the numbers to call.      Patient Education     Thyroid Fine Needle Aspiration (FNA) Biopsy    The thyroid is a gland at the front of the neck. A thyroid fine needle aspiration (FNA) biopsy is a procedure to remove a small piece of tissue from your thyroid gland. The tissue is removed with a small, hollow needle. The sample is sent to a lab to be examined to find a diagnosis.  Why thyroid FNA biopsy is done  Hard nodules can sometimes form inside the thyroid gland. You may notice a small bump in the gland area. Thyroid nodules are common. The nodules are often not dangerous. But in some cases they can be thyroid cancer. A thyroid FNA biopsy can test for cancer.  Risks of thyroid FNA biopsy  All procedures have some risks. The risks of thyroid FNA biopsy include:    Bleeding at the biopsy site    Infection    Damage to areas near the thyroid (rare)    Need for a repeat  biopsy if the test result is in doubt.      Getting ready for your procedure  Talk with your healthcare provider how to get ready. Tell him or her about all the medicines you take. This includes over-the-counter medicines such as ibuprofen. It also includes vitamins, herbs, and other supplements. You may need to stop taking some medicines before the procedure, such as blood thinners and aspirin. You should be able to eat and drink normally before the procedure.  On the day of your procedure  Your procedure may be done in a hospital or a medical clinic. You should be able to go home the same day. A typical procedure goes like this:    You may be given a medicine to help you relax, if needed.          A local anesthetic (numbing medicine) may be injected in the area in the front of your neck. Because the biopsy needle is so small, this may not be needed.    Your healthcare provider may use an ultrasound machine during the biopsy. This machine uses sound waves and a computer to show live images of the tissues in your neck. This helps your healthcare provider guide the needle to the right spot. A gel will be put on your neck to help the ultrasound wand maintain contact with your skin and enhance the signal generated by the sound waves.     The biopsy area will be cleaned. A thin, fine (narrow) needle will be put through your skin and into your thyroid gland. You may feel a small amount of pain or pressure. The needle will be gently pushed into the nodule. A syringe attached to the needle will use gentle suction to remove a small piece of tissue from the nodule. This process may be repeated several times in the same nodule to get different samples from all parts of the nodule. You will need to be very still and not cough, talk, or swallow while the needle is put in.     The needle will then be removed. A small bandage will be put on the needle insertion site. The tissue will be sent to a pathology lab to be looked at for  signs of cancer.    Sometimes the healthcare provider will use the ultrasound want again 15 to 30 minutes later to be sure there is no bleeding or swelling where the biopsy was performed.  After your procedure  You ll likely be able to go home that day and can go back to your normal activities right away. You can remove your bandage within a few hours.  The site of the biopsy may be sore for a day or two after the procedure. You can take over-the-counter pain medicine such as acetaminophen as needed. Follow any other instructions that your healthcare provider gives you.  Follow-up care  Ask your healthcare provider when to expect to get your results from the biopsy. It may take several days. In some cases, the biopsy result may be inconclusive. This means the lab can t be sure if your nodule is cancer. You may need a repeat biopsy or surgery.  If your thyroid nodule is not cancer, you may not need any treatment. Your healthcare provider may want to keep track of your nodule. You may need another biopsy in the future.  If your nodule is cancer, you may need surgery or other treatment. Your healthcare provider will tell you more about what to expect and what needs to be done next.  When to call your healthcare provider  Call your healthcare provider right away if you have any of the following:    Fever of 100.4 F (38 C) or higher    Redness, swelling, bleeding, or fluid leaking from the biopsy site    Pain around the biopsy site that gets worse  Be sure you know how to reach your healthcare provider after office hours and on weekends and holidays.  Date Last Reviewed: 6/1/2018 2000-2019 [employer]. [address] [place] [address], [place] [address]. All rights reserved. This information is not intended as a substitute for professional medical care. Always follow your healthcare professional's instructions.

## 2020-09-30 DIAGNOSIS — Z11.59 ENCOUNTER FOR SCREENING FOR OTHER VIRAL DISEASES: Primary | ICD-10-CM

## 2020-10-05 DIAGNOSIS — Z11.59 ENCOUNTER FOR SCREENING FOR OTHER VIRAL DISEASES: ICD-10-CM

## 2020-10-05 PROCEDURE — U0003 INFECTIOUS AGENT DETECTION BY NUCLEIC ACID (DNA OR RNA); SEVERE ACUTE RESPIRATORY SYNDROME CORONAVIRUS 2 (SARS-COV-2) (CORONAVIRUS DISEASE [COVID-19]), AMPLIFIED PROBE TECHNIQUE, MAKING USE OF HIGH THROUGHPUT TECHNOLOGIES AS DESCRIBED BY CMS-2020-01-R: HCPCS | Performed by: INTERNAL MEDICINE

## 2020-10-06 LAB
SARS-COV-2 RNA SPEC QL NAA+PROBE: NOT DETECTED
SPECIMEN SOURCE: NORMAL

## 2020-10-08 ENCOUNTER — HOSPITAL ENCOUNTER (OUTPATIENT)
Dept: ULTRASOUND IMAGING | Facility: CLINIC | Age: 65
Discharge: HOME | End: 2020-10-08
Attending: INTERNAL MEDICINE | Admitting: INTERNAL MEDICINE
Payer: COMMERCIAL

## 2020-10-08 DIAGNOSIS — E04.1 THYROID NODULE: ICD-10-CM

## 2020-10-08 PROCEDURE — 250N000009 HC RX 250: Performed by: INTERNAL MEDICINE

## 2020-10-08 PROCEDURE — 999N001014 HC STATISTIC CYTO WRIGHT STAIN TC: Performed by: INTERNAL MEDICINE

## 2020-10-08 PROCEDURE — 88173 CYTOPATH EVAL FNA REPORT: CPT | Mod: TC | Performed by: INTERNAL MEDICINE

## 2020-10-08 PROCEDURE — 10005 FNA BX W/US GDN 1ST LES: CPT

## 2020-10-08 PROCEDURE — 88173 CYTOPATH EVAL FNA REPORT: CPT | Mod: 26

## 2020-10-08 RX ADMIN — LIDOCAINE HYDROCHLORIDE 10 ML: 10 INJECTION, SOLUTION EPIDURAL; INFILTRATION; INTRACAUDAL; PERINEURAL at 08:55

## 2020-10-08 NOTE — IR NOTE
ULTRASOUND-GUIDED THYROID FINE NEEDLE ASPIRATION BIOPSY 10/8/2020 9:09 AM     HISTORY: Thyroid nodule    TECHNIQUE: After obtaining informed consent, the patient was placed in a supine position on the ultrasound table. The neck was prepped and draped in the usual sterile manner. 1% lidocaine was injected for local anesthesia. Under ultrasound guidance, using a 25-gauge needle, fine-needle aspirations were obtained from the requested nodule. The samples were submitted to pathology.    The patient tolerated the procedure well. There were no immediate postprocedure complications.    IMPRESSION: Ultrasound-guided thyroid biopsy performed as above.

## 2020-10-09 LAB — COPATH REPORT: NORMAL

## 2020-10-15 ENCOUNTER — TELEPHONE (OUTPATIENT)
Dept: ENDOCRINOLOGY | Facility: CLINIC | Age: 65
End: 2020-10-15

## 2020-10-15 DIAGNOSIS — E04.1 THYROID NODULE: Primary | ICD-10-CM

## 2020-10-15 NOTE — LETTER
[facility]  [address], [address]  [place] [address]  [phone]        October 19, 2020    Nader Alfaro  [address] [address] [address]            Dear Mr. Nader CONNELL Dominic:    Plan is to continue to monitor and repeat Ultrasound in 6 months.  Will recommend repeat thyroid US in 6 months to assess for interval change in size and characteristics of thyroid nodules.  Follow up after that.     Please note that schedule gets booked out fast and it is difficult to add on patients when schedule is full. So it is highly advised that you make appointment ahead of time so that we can follow up on labs/imaging studies in timely manner. This will help us to serve you better.  If you have any further questions or problems, please contact our office.    Sincerely,      Dr. Maria Del Rosario Baker

## 2020-10-15 NOTE — TELEPHONE ENCOUNTER
CYTOLOGIC INTERPRETATION:     Thyroid, left, ultrasound-guided fine needle aspiration:   Nondiagnostic   specimen.     The [place] implied risk of malignancy and recommended clinical   management:   Nondiagnostic or Unsatisfactory has a 5-10% risk of malignancy,   recommended management is repeat FNA with   ultrasound guidance in 4-6 weeks.     Specimen Adequacy: Unsatisfactory for evaluation.  Specimen processed and   evaluated, but unsatisfactory for   evaluation of epithelial cell abnormality due to:   ...insufficient numbers of follicular cells for diagnosis.     Called pt.    Previous FNA was c/w AUS.  Nodule 1: 1.3 x 0.8 x 1.2 cm nodule in the medial inferior left thyroid lobe.   Composition: Mixed cystic and solid.    Pt not clear with English.  He prefers to speak with .    This FNA c/w non diagnostic sample- not good sample- it is a complex nodule so it was difficult biopsy.  Plan is to continue to monitor and repeat Ultrasound in 6 months.  Will recommend repeat thyroid US in 6 months to assess for interval change in size and characteristics of thyroid nodules.  Follow up after that.     [facility] radiology scheduleing   [place]  [phone]   [facility] Radiology scheduling  [place]  [phone]     Please call and schedule the recommended test as discussed in clinic visit. These are the numbers to call.      Please note that schedule gets booked out fast and it is difficult to add on patients when schedule is full.  So it is highly advised that you make appointment ahead of time so that we can follow up on labs/imaging studies in timely manner. This will help us to serve you better.  Please call the clinic to make appropriate appointments.  Let us know if you have any questions or if you need any help with scheduling.    Please inform patient with help of . I am sending [employer] message as well.

## 2020-10-19 NOTE — TELEPHONE ENCOUNTER
Patient has been called and notified of results  .  Please call and schedule the recommended test as discussed in clinic visit. Plan is to continue to monitor and repeat Ultrasound in 6 months.  Will recommend repeat thyroid US in 6 months to assess for interval change in size and characteristics of thyroid nodules.  Follow up after that.    Please note that schedule gets booked out fast and it is difficult to add on patients when schedule is full. So it is highly advised that you make appointment ahead of time so that we can follow up on labs/imaging studies in timely manner. This will help us to serve you better.     Please inform patient with help of . I am sending [employer] message as well.

## 2020-12-08 ENCOUNTER — HOSPITAL ENCOUNTER (OUTPATIENT)
Dept: CT IMAGING | Facility: CLINIC | Age: 65
Discharge: HOME | End: 2020-12-08
Attending: SURGERY | Admitting: SURGERY
Payer: COMMERCIAL

## 2020-12-08 DIAGNOSIS — I72.3 ILIAC ARTERY ANEURYSM, RIGHT (H): ICD-10-CM

## 2020-12-08 LAB
CREAT BLD-MCNC: 1.1 MG/DL (ref 0.66–1.25)
GFR SERPL CREATININE-BSD FRML MDRD: 68 ML/MIN/{1.73_M2}

## 2020-12-08 PROCEDURE — 250N000011 HC RX IP 250 OP 636: Performed by: RADIOLOGY

## 2020-12-08 PROCEDURE — 250N000009 HC RX 250: Performed by: RADIOLOGY

## 2020-12-08 PROCEDURE — 74174 CTA ABD&PLVS W/CONTRAST: CPT

## 2020-12-08 PROCEDURE — 82565 ASSAY OF CREATININE: CPT

## 2020-12-08 RX ORDER — IOPAMIDOL 755 MG/ML
500 INJECTION, SOLUTION INTRAVASCULAR ONCE
Status: COMPLETED | OUTPATIENT
Start: 2020-12-08 | End: 2020-12-08

## 2020-12-08 RX ADMIN — SODIUM CHLORIDE 80 ML: 9 INJECTION, SOLUTION INTRAVENOUS at 10:06

## 2020-12-08 RX ADMIN — IOPAMIDOL 80 ML: 755 INJECTION, SOLUTION INTRAVENOUS at 10:06

## 2020-12-11 ENCOUNTER — OFFICE VISIT (OUTPATIENT)
Dept: OTHER | Facility: CLINIC | Age: 65
End: 2020-12-11
Attending: SURGERY
Payer: COMMERCIAL

## 2020-12-11 VITALS — TEMPERATURE: 98.4 F | DIASTOLIC BLOOD PRESSURE: 78 MMHG | HEART RATE: 73 BPM | SYSTOLIC BLOOD PRESSURE: 138 MMHG

## 2020-12-11 DIAGNOSIS — I72.3 ILIAC ARTERY ANEURYSM, RIGHT (H): Primary | ICD-10-CM

## 2020-12-11 PROCEDURE — 99213 OFFICE O/P EST LOW 20 MIN: CPT | Performed by: SURGERY

## 2020-12-11 PROCEDURE — G0463 HOSPITAL OUTPT CLINIC VISIT: HCPCS

## 2020-12-11 NOTE — PROGRESS NOTES
Vascular Surgery Progress Note     Date: December 11, 2020     Reason for Visit:  F/u iliac artery aneurysm    Subjective:  Mr. Alfaro reports no changes in the last year. He has undergone recent evaluation for thyroid nodules. He reports no recent changes in his health. He has been staying well during the pandemic. He has not had back or abdominal pain. No difficulty with eating, no weight loss/gain. No trouble with walking.       Current Outpatient Medications:      blood glucose (NO BRAND SPECIFIED) lancets standard, Use to test blood sugar daily or as directed., Disp: 100 each, Rfl: 3     blood glucose (NO BRAND SPECIFIED) test strip, Use to test blood sugar daily or as directed., Disp: 100 strip, Rfl: 3     blood glucose monitoring (NO BRAND SPECIFIED) meter device kit, Use to test blood sugar daily or as directed., Disp: 1 kit, Rfl: 1     cetirizine (ZYRTEC) 10 MG tablet, Take 10 mg by mouth daily, Disp: , Rfl:      glipiZIDE (GLUCOTROL XL) 5 MG 24 hr tablet, Take 1 tablet (5 mg) by mouth daily, Disp: 90 tablet, Rfl: 3     lisinopril (PRINIVIL/ZESTRIL) 5 MG tablet, Take 1 tablet (5 mg) by mouth daily, Disp: 90 tablet, Rfl: 3     lovastatin (MEVACOR) 40 MG tablet, TAKE ONE TABLET BY MOUTH AT BEDTIME, Disp: 90 tablet, Rfl: 3     metFORMIN (GLUCOPHAGE) 1000 MG tablet, TAKE ONE TABLET BY MOUTH TWICE A DAY WITH MEALS (BREAKFAST AND DINNER), Disp: 180 tablet, Rfl: 3     omeprazole (PRILOSEC) 40 MG DR capsule, TAKE ONE CAPSULE BY MOUTH EVERY DAY; TAKE 30 TO 60 MINUTES BEFORE A MEAL, Disp: 90 capsule, Rfl: 3     Physical Exam   Temp: 98.4  F (36.9  C) Temp src: Oral BP: 138/78 Pulse: 73            Vital Signs with Ranges  Temp:  [98.4  F (36.9  C)] 98.4  F (36.9  C)  Pulse:  [73] 73  BP: (138-152)/(78-84) 138/78  0 lbs 0 oz    Constitutional: cooperative, no apparent distress, sitting comfortably in chair. Accompanied by his wife. (Telephone  used for interview and discussion).   Cardiopulm: RRR, no m/g  "appreciated; lungs CTAB anteriorly  Vascular: bilateral radial pulses palpable  Musculoskeletal: grossly normal and symmetric ROM and strength in BL extremities   Neurologic: Awake, alert, oriented to name, place, time, and situation    Imaging:  I have reviewed the following imaging studies:  CTA abd/pelvis (5/20/20 - earlier this year): \"Abdominal aorta: There is scattered calcified plaque in the abdominal aorta. This does not contribute to a significant stenosis. There is mild ectasia of the distal abdominal aorta which measures approximately 2.5 x 2.6 cm. There is a moderate stenosis in the proximal celiac trunk. The superior mesenteric artery and inferior mesenteric artery are patent without significant stenoses. The renal arteries are patent without significant stenoses. Iliac arteries: There is a 3.0 cm right common iliac artery aneurysm. The left common iliac artery measures approximately 14 mm in diameter. The external iliac arteries and proximal femoral arteries are patent without significant stenoses.\" On my review of the previous films, this appears to be relatively stable in size and morphology, with approximately 1-2 mm of enlargement from 4/5/19.      CTA abd/pelvis (12/8/20): \"Again identified is a right common iliac artery aneurysm. This has maximum anterior-posterior and transverse dimensions of approximately 3.0 x 2.9 cm. These measurements are not significantly changed when compared to previous Exam. The iliac arteries are otherwise patent without significant stenoses. Again noted is mild ectasia of the distal abdominal aorta which has a maximum diameter of approximately 2.6 cm. There is a moderate stenosis at the origin of the celiac trunk. The superior mesenteric artery, renal arteries, and inferior mesenteric artery are patent without significant stenoses.\" On maximal measurements approx 30.2 mm x 28.8 mm on right common iliac artery; in comparison, the same region was approx 28.7 x 28.4 in " 05/2020--perhaps 2 mm growth, not significantly changed.        Assessment & Plan   Mr. Alfaro is a 65 year old male with history of HTN, DL, and well-controlled DM, who presents for follow-up evaluation of a right common iliac artery aneurysm. His right ROSINA is approximately 3.0 cm in greatest diameter, and aneurysmal enlargement appears to extend to both the aortic bifurcation and the iliac bifurcation.       I discussed and reviewed the imaging with Mr. Alfaro and his wife.     He has had no significant interval growth. We discussed that we would wait until a size around 3.5 cm developed before proceeding with repair.     He will repeat imaging in 6 months and I will see him back in clinic at that point.     He understands to present for evaluation if develops sudden back or abdominal pain.     Marlys Luna

## 2020-12-11 NOTE — PROGRESS NOTES
"Nader Alfaro is a 65 year old male who presents for:  Chief Complaint   Patient presents with     RECHECK     CTA done 12/8/20- f/u of known right ROSINA aneurysm        Vitals:    Vitals:    12/11/20 1100 12/11/20 1110   BP: (!) 152/84 138/78   BP Location: Left arm Left arm   Patient Position: Chair Chair   Cuff Size: Adult Regular Adult Regular   Pulse: 73    Temp: 98.4  F (36.9  C)    TempSrc: Oral        BMI:  Estimated body mass index is 25.5 kg/m  as calculated from the following:    Height as of 8/14/20: 5' 2.13\" (1.578 m).    Weight as of 8/14/20: 140 lb (63.5 kg).    Pain Score:  Data Unavailable        Mady Beasley MA    "

## 2021-01-13 ENCOUNTER — TELEPHONE (OUTPATIENT)
Dept: PEDIATRICS | Facility: CLINIC | Age: 66
End: 2021-01-13

## 2021-01-13 NOTE — TELEPHONE ENCOUNTER
Called patient patiens son and informed him that he needs to call medical records the phone number is [phone]. Maame Seay CMA on 1/13/2021 at 1:20 PM

## 2021-01-13 NOTE — TELEPHONE ENCOUNTER
Reason for Call:  Other      Detailed comments: Patient son called and patient need 3 ov visit summaries for his long term disability for 2020 and if they can be printed an given to the  patient or a family member will picked them up.    Phone Number Patient can be reached at: Home number on file [phone] (home)    Best Time: Any time    Can we leave a detailed message on this number? YES    Call taken on 1/13/2021 at 1:01 PM by Lamar Hinton

## 2021-01-15 ENCOUNTER — HEALTH MAINTENANCE LETTER (OUTPATIENT)
Age: 66
End: 2021-01-15

## 2021-02-04 ENCOUNTER — TRANSFERRED RECORDS (OUTPATIENT)
Dept: HEALTH INFORMATION MANAGEMENT | Facility: CLINIC | Age: 66
End: 2021-02-04

## 2021-02-05 ENCOUNTER — ANCILLARY PROCEDURE (OUTPATIENT)
Dept: GENERAL RADIOLOGY | Facility: CLINIC | Age: 66
End: 2021-02-05
Attending: INTERNAL MEDICINE
Payer: COMMERCIAL

## 2021-02-05 ENCOUNTER — OFFICE VISIT (OUTPATIENT)
Dept: PEDIATRICS | Facility: CLINIC | Age: 66
End: 2021-02-05
Payer: COMMERCIAL

## 2021-02-05 VITALS
SYSTOLIC BLOOD PRESSURE: 120 MMHG | BODY MASS INDEX: 24.41 KG/M2 | OXYGEN SATURATION: 97 % | TEMPERATURE: 97.6 F | DIASTOLIC BLOOD PRESSURE: 67 MMHG | WEIGHT: 134 LBS | HEART RATE: 66 BPM

## 2021-02-05 DIAGNOSIS — R05.9 COUGH: ICD-10-CM

## 2021-02-05 DIAGNOSIS — I10 ESSENTIAL HYPERTENSION: ICD-10-CM

## 2021-02-05 DIAGNOSIS — E11.9 TYPE 2 DIABETES MELLITUS WITHOUT COMPLICATION, WITHOUT LONG-TERM CURRENT USE OF INSULIN (H): Primary | ICD-10-CM

## 2021-02-05 DIAGNOSIS — Z12.5 SCREENING PSA (PROSTATE SPECIFIC ANTIGEN): ICD-10-CM

## 2021-02-05 DIAGNOSIS — C16.9 MALIGNANT NEOPLASM OF STOMACH, UNSPECIFIED LOCATION (H): ICD-10-CM

## 2021-02-05 DIAGNOSIS — E78.5 HYPERLIPIDEMIA LDL GOAL <100: ICD-10-CM

## 2021-02-05 LAB
CREAT UR-MCNC: 64 MG/DL
HBA1C MFR BLD: 6.7 % (ref 0–5.6)
MICROALBUMIN UR-MCNC: 25 MG/L
MICROALBUMIN/CREAT UR: 39.03 MG/G CR (ref 0–17)

## 2021-02-05 PROCEDURE — 99214 OFFICE O/P EST MOD 30 MIN: CPT | Performed by: INTERNAL MEDICINE

## 2021-02-05 PROCEDURE — 83036 HEMOGLOBIN GLYCOSYLATED A1C: CPT | Performed by: INTERNAL MEDICINE

## 2021-02-05 PROCEDURE — 80053 COMPREHEN METABOLIC PANEL: CPT | Performed by: INTERNAL MEDICINE

## 2021-02-05 PROCEDURE — 82043 UR ALBUMIN QUANTITATIVE: CPT | Performed by: INTERNAL MEDICINE

## 2021-02-05 PROCEDURE — 80061 LIPID PANEL: CPT | Performed by: INTERNAL MEDICINE

## 2021-02-05 PROCEDURE — 71046 X-RAY EXAM CHEST 2 VIEWS: CPT | Performed by: RADIOLOGY

## 2021-02-05 PROCEDURE — 36415 COLL VENOUS BLD VENIPUNCTURE: CPT | Performed by: INTERNAL MEDICINE

## 2021-02-05 PROCEDURE — G0103 PSA SCREENING: HCPCS | Performed by: INTERNAL MEDICINE

## 2021-02-05 RX ORDER — LOVASTATIN 40 MG
TABLET ORAL
Qty: 90 TABLET | Refills: 3 | Status: SHIPPED | OUTPATIENT
Start: 2021-02-05 | End: 2022-02-02

## 2021-02-05 RX ORDER — GLIPIZIDE 5 MG/1
5 TABLET, FILM COATED, EXTENDED RELEASE ORAL DAILY
Qty: 90 TABLET | Refills: 3 | Status: SHIPPED | OUTPATIENT
Start: 2021-02-05 | End: 2022-02-02

## 2021-02-05 RX ORDER — OMEPRAZOLE 40 MG/1
CAPSULE, DELAYED RELEASE ORAL
Qty: 90 CAPSULE | Refills: 3 | Status: SHIPPED | OUTPATIENT
Start: 2021-02-05 | End: 2022-02-02

## 2021-02-05 RX ORDER — LISINOPRIL 5 MG/1
5 TABLET ORAL DAILY
Qty: 90 TABLET | Refills: 3 | Status: SHIPPED | OUTPATIENT
Start: 2021-02-05 | End: 2022-02-02

## 2021-02-05 NOTE — PROGRESS NOTES
ICD-10-CM    1. Type 2 diabetes mellitus without complication, without long-term current use of insulin (H)  E11.9 Lipid Profile     Hemoglobin A1c     Albumin Random Urine Quantitative with Creat Ratio     glipiZIDE (GLUCOTROL XL) 5 MG 24 hr tablet     lisinopril (ZESTRIL) 5 MG tablet     metFORMIN (GLUCOPHAGE) 1000 MG tablet     blood glucose (NO BRAND SPECIFIED) lancets standard     blood glucose (NO BRAND SPECIFIED) test strip     blood glucose monitoring (NO BRAND SPECIFIED) meter device kit   2. Hyperlipidemia LDL goal <100  E78.5 Lipid Profile     lovastatin (MEVACOR) 40 MG tablet   3. Hypertension  I10 Comprehensive metabolic panel     lisinopril (ZESTRIL) 5 MG tablet   4. Malignant neoplasm of stomach, unspecified location (H)  C16.9 omeprazole (PRILOSEC) 40 MG DR capsule   5. Screening PSA (prostate specific antigen)  Z12.5 Prostate spec antigen screen   6. Cough  R05 XR Chest 2 Views     Diabetes - overall doing well. CPM.    Hyperlipidemia.   Lab Results   Component Value Date    LDL 93 02/07/2020     05/09/2019     HTN. BP is controlled. No med changes needed.    Stomach cancer. Continue w/ GI/onc f/u as needed and PPI.    Cough - CXR is normal. Possible nasal drainage. Consider OTC AR meds.    Mazin Ford MD      Subjective     Doe is a 65 year old who presents to clinic today for the following health issues  accompanied by his spouse and video interpretor as well.    HPI       Diabetes Follow-up    How often are you checking your blood sugar? A few times a week  What time of day are you checking your blood sugars (select all that apply)?  Before meals  Have you had any blood sugars above 200?  No  Have you had any blood sugars below 70?  No    What symptoms do you notice when your blood sugar is low?  None    What concerns do you have today about your diabetes? None     Do you have any of these symptoms? (Select all that apply)  No numbness or tingling in feet.  No redness, sores or  blisters on feet.  No complaints of excessive thirst.  No reports of blurry vision.  No significant changes to weight.    Have you had a diabetic eye exam in the last 12 months? No      Hyperlipidemia Follow-Up      Are you regularly taking any medication or supplement to lower your cholesterol?   Yes- .    Are you having muscle aches or other side effects that you think could be caused by your cholesterol lowering medication?  No    Hypertension Follow-up      Do you check your blood pressure regularly outside of the clinic? Yes     Are you following a low salt diet? Yes    Are your blood pressures ever more than 140 on the top number (systolic) OR more   than 90 on the bottom number (diastolic), for example 140/90? No    BP Readings from Last 2 Encounters:   02/05/21 120/67   12/11/20 138/78     Hemoglobin A1C (%)   Date Value   08/14/2020 7.3 (H)   02/07/2020 6.6 (H)     LDL Cholesterol Calculated (mg/dL)   Date Value   02/07/2020 93   05/09/2019 113 (H)         How many servings of fruits and vegetables do you eat daily?  4 or more    On average, how many sweetened beverages do you drink each day (Examples: soda, juice, sweet tea, etc.  Do NOT count diet or artificially sweetened beverages)?   3 a week     How many days per week do you exercise enough to make your heart beat faster? 3 or less    How many minutes a day do you exercise enough to make your heart beat faster? 10 - 19  How many days per week do you miss taking your medication? 1    What makes it hard for you to take your medications?  remembering to take    Hx of gastric cancer.  Taking omeprazole scheduled.  EGD was done yesterday - result not availble to me today.     4-5 mo hx of cough.  Non-productive most of the time.  Sometimes can have some mucus.         Objective    /67 (BP Location: Right arm, Cuff Size: Adult Regular)   Pulse 66   Temp 97.6  F (36.4  C) (Tympanic)   Wt 60.8 kg (134 lb)   SpO2 97%   BMI 24.41 kg/m    Body mass  index is 24.41 kg/m .  Physical Exam   GENERAL: healthy, alert and no distress  EYES: Eyes grossly normal to inspection, PERRL and conjunctivae and sclerae normal  HENT: ear canals and TM's normal  NECK: no adenopathy, no asymmetry, thyroid normal to palpation  RESP: lungs clear to auscultation - no rales, rhonchi or wheezes  CV: regular rate and rhythm, normal S1 S2, no murmur, no peripheral edema and peripheral pulses strong  ABDOMEN: soft, nontender, bowel sounds normal  MS: no gross musculoskeletal defects noted, no edema  SKIN: no suspicious lesions or rashes  NEURO: Normal strength and tone, mentation intact and speech normal  PSYCH: mentation appears normal, affect normal/bright  Diabetic foot exam: normal DP and PT pulses, no trophic changes or ulcerative lesions and normal sensory exam    Recent Results (from the past 744 hour(s))   XR Chest 2 Views    Narrative    CHEST TWO VIEWS  2/5/2021 11:08 AM     HISTORY: 65-year-old patient with history of cough.       Impression    IMPRESSION: Since April 6, 2017, heart size is normal. No pleural  effusion, pneumothorax, or abnormal area of consolidation.    GEORGE ZENDEJAS MD

## 2021-02-06 LAB
ALBUMIN SERPL-MCNC: 3.5 G/DL (ref 3.4–5)
ALP SERPL-CCNC: 64 U/L (ref 40–150)
ALT SERPL W P-5'-P-CCNC: 31 U/L (ref 0–70)
ANION GAP SERPL CALCULATED.3IONS-SCNC: 6 MMOL/L (ref 3–14)
AST SERPL W P-5'-P-CCNC: 24 U/L (ref 0–45)
BILIRUB SERPL-MCNC: 0.6 MG/DL (ref 0.2–1.3)
BUN SERPL-MCNC: 18 MG/DL (ref 7–30)
CALCIUM SERPL-MCNC: 9.8 MG/DL (ref 8.5–10.1)
CHLORIDE SERPL-SCNC: 105 MMOL/L (ref 94–109)
CHOLEST SERPL-MCNC: 175 MG/DL
CO2 SERPL-SCNC: 25 MMOL/L (ref 20–32)
CREAT SERPL-MCNC: 0.98 MG/DL (ref 0.66–1.25)
GFR SERPL CREATININE-BSD FRML MDRD: 81 ML/MIN/{1.73_M2}
GLUCOSE SERPL-MCNC: 146 MG/DL (ref 70–99)
HDLC SERPL-MCNC: 59 MG/DL
LDLC SERPL CALC-MCNC: 98 MG/DL
NONHDLC SERPL-MCNC: 116 MG/DL
POTASSIUM SERPL-SCNC: 4.2 MMOL/L (ref 3.4–5.3)
PROT SERPL-MCNC: 7.2 G/DL (ref 6.8–8.8)
PSA SERPL-ACNC: 2.43 UG/L (ref 0–4)
SODIUM SERPL-SCNC: 136 MMOL/L (ref 133–144)
TRIGL SERPL-MCNC: 92 MG/DL

## 2021-05-10 ENCOUNTER — TRANSFERRED RECORDS (OUTPATIENT)
Dept: HEALTH INFORMATION MANAGEMENT | Facility: CLINIC | Age: 66
End: 2021-05-10

## 2021-05-12 ENCOUNTER — TRANSFERRED RECORDS (OUTPATIENT)
Dept: HEALTH INFORMATION MANAGEMENT | Facility: CLINIC | Age: 66
End: 2021-05-12

## 2021-05-19 ENCOUNTER — TELEPHONE (OUTPATIENT)
Dept: OTHER | Facility: CLINIC | Age: 66
End: 2021-05-19

## 2021-05-19 NOTE — TELEPHONE ENCOUNTER
Patient completed CTA 5/10/2021 in [facility]. Per wife, she is wondering if CTA is the same as the one ordered by Dr. Luna. Wondering if CTA needs to be redone?     Informed will send message to nurse to review and call back when instructions are given.       Evelin HAYNES

## 2021-05-19 NOTE — TELEPHONE ENCOUNTER
I called [facility] and patient completed a CT chest abdomen pelvis on 5/14/21 with [facility]. Image is being pushed and report is being faxed. Will review image report once it is obtained.    Archana RENDON, RN    [facility]  Office: [phone]  Fax: [phone]

## 2021-05-21 NOTE — TELEPHONE ENCOUNTER
CT done at [facility] is sufficient for this exam with Dr. Luna.    Sent images to [facility].    Please arrange for appointment with Dr. Luna.    Next available is fine.  No other labs/images prior.    Carlotta Larose RN BSN  [facility]  [phone]

## 2021-05-25 ENCOUNTER — OFFICE VISIT (OUTPATIENT)
Dept: OTHER | Facility: CLINIC | Age: 66
End: 2021-05-25
Attending: SURGERY
Payer: COMMERCIAL

## 2021-05-25 VITALS
HEART RATE: 74 BPM | DIASTOLIC BLOOD PRESSURE: 71 MMHG | HEIGHT: 62 IN | SYSTOLIC BLOOD PRESSURE: 123 MMHG | WEIGHT: 140 LBS | BODY MASS INDEX: 25.76 KG/M2

## 2021-05-25 DIAGNOSIS — I72.3 ILIAC ARTERY ANEURYSM, RIGHT (H): Primary | ICD-10-CM

## 2021-05-25 PROCEDURE — 99215 OFFICE O/P EST HI 40 MIN: CPT | Performed by: SURGERY

## 2021-05-25 PROCEDURE — G0463 HOSPITAL OUTPT CLINIC VISIT: HCPCS

## 2021-05-25 ASSESSMENT — MIFFLIN-ST. JEOR: SCORE: 1309.29

## 2021-05-25 NOTE — PROGRESS NOTES
"[facility]      Patient is in clinic today to see Dr. Luna.      Patient is here for a  follow up  to discuss test results  Pt is currently taking Statin.    Patient's condition is stable.    /71 (BP Location: Right arm, Patient Position: Chair, Cuff Size: Adult Regular)   Pulse 74   Ht 5' 2\" (1.575 m)   Wt 140 lb (63.5 kg)   BMI 25.61 kg/m      The provider has been notified that the patient has no concerns.     Questions patient would like addressed today are: N/A.    Refills are needed: No    At the end of the visit the patient was provided with education both verbally and on their AVS regarding follow up appointment(s).        Mady Beasley MA    "

## 2021-05-25 NOTE — PROGRESS NOTES
Vascular Surgery Progress Note     Date: May 25, 2021     Reason for Visit:  Follow-up of right common iliac aneurysm    Interim History:   GI updates: Longitudinal follow-up of gastric adenocarcinoma: 2/4/21 UGI consistent with non-erosive gastritis; cold biopsies taken (I am unable to see the pathology reports). Colonoscopy last performed in 2012; due for repeat next year.    Subjective:  Mr. Alfaro reports that he was fairly depressed after his last visit - both about the aneurysm and the Covid-19 virus. He says he is feeling somewhat better now.     He denies any new chest pain, back pain, abdominal pain. He has not had any weight loss or weight gain. He is walking minimally due to the pandemic but continues to exercise at home. No pain in his buttocks or hips.       Current Outpatient Medications:      blood glucose (NO BRAND SPECIFIED) lancets standard, Use to test blood sugar daily or as directed., Disp: 100 each, Rfl: 3     blood glucose (NO BRAND SPECIFIED) test strip, Use to test blood sugar daily or as directed., Disp: 100 strip, Rfl: 3     blood glucose monitoring (NO BRAND SPECIFIED) meter device kit, Use to test blood sugar daily or as directed., Disp: 1 kit, Rfl: 1     cetirizine (ZYRTEC) 10 MG tablet, Take 10 mg by mouth daily, Disp: , Rfl:      glipiZIDE (GLUCOTROL XL) 5 MG 24 hr tablet, Take 1 tablet (5 mg) by mouth daily, Disp: 90 tablet, Rfl: 3     lisinopril (ZESTRIL) 5 MG tablet, Take 1 tablet (5 mg) by mouth daily, Disp: 90 tablet, Rfl: 3     lovastatin (MEVACOR) 40 MG tablet, TAKE ONE TABLET BY MOUTH AT BEDTIME, Disp: 90 tablet, Rfl: 3     metFORMIN (GLUCOPHAGE) 1000 MG tablet, TAKE ONE TABLET BY MOUTH TWICE A DAY WITH MEALS (BREAKFAST AND DINNER), Disp: 180 tablet, Rfl: 3     omeprazole (PRILOSEC) 40 MG DR capsule, TAKE ONE CAPSULE BY MOUTH EVERY DAY; TAKE 30 TO 60 MINUTES BEFORE A MEAL, Disp: 90 capsule, Rfl: 3     Physical Exam                      Vital Signs with Ranges  Pulse:  [74]  "74  BP: (123)/(71) 123/71  0 lbs 0 oz    Constitutional: cooperative, no apparent distress, sitting comfortably in chair. Thin and muscular habitus. Accompanied by his wife. Hearing aides in place.  Cardiopulm: RRR, no m/g appreciated  Vasc: no carotid bruits. Palp BL radial and PT pulses.  Musculoskeletal: grossly normal and symmetric ROM and strength in BL extremities   Neurologic: Awake, alert, oriented to name, place, time, and situation    ong  assisted with the visit.    Imaging:  I have reviewed the following imaging studies:  CTA abd/pelvis (12/8/20 - prior imaging): \"Again identified is a right common iliac artery aneurysm. This has maximum anterior-posterior and transverse dimensions of approximately 3.0 x 2.9 cm. These measurements are not significantly changed when compared to previous Exam. The iliac arteries are otherwise patent without significant stenoses. Again noted is mild ectasia of the distal abdominal aorta which has a maximum diameter of approximately 2.6 cm. There is a moderate stenosis at the origin of the celiac trunk. The superior mesenteric artery, renal arteries, and inferior mesenteric artery are patent without significant stenoses.\" On maximal measurements approx 30.2 mm x 28.8 mm on right common iliac artery; in comparison, the same region was approx 28.7 x 28.4 in 05/2020.    CT chest/abd/pelvis with contrast (5/10/21): Complete read available as a scan; images available in PACs. Right common iliac artery aneurysm read as 2.9 x 2.7 cm. On my review: 30.9 x 27.4 mm--this larger measurement is not completely orthogonal to the longitudinal axis and therefore may be slightly more diagonal/elongated. There is no drastic change in morphology and no other clear arterial degeneration.        Assessment & Plan   Mr. Alfaro is a 65 year old male with history of HTN, DL, remote history of gastric adenocarcinoma (stage II, T2 N1 M0, s/p Bilroth 1 partial gastrectomy in 2012) and " well-controlled DM, who presents for follow-up evaluation of a right common iliac artery aneurysm. His right ROSINA is approximately 3.0 cm in greatest diameter, and aneurysmal enlargement appears to extend to both the aortic bifurcation and the iliac bifurcation.       Defer to Dr. Ford (PCP) and Dr. Vallejo (Oncology), but in reviewing records, he will be due for colonoscopy next year.     Discussed with him that he has had very minimal (if any) growth in the right ROSINA aneurysm in the last 6 months.     Threshold for repair at 35 mm or greater.    Explained that there is no specific dietary recommendation I would give, and no medication to specifically reduce the size of aneurysms. Remaining on statin therapy is helpful.     Will plan to repeat imaging with an iliac duplex US in 6 months to re-evaluate the size of the R ROSINA aneurysm. This will be a different imaging modality and may not be directly comparable, but should show significant growth if present.     Marlys Luna    Total time spent on the date of this encounter doing: chart review, review of test results, patient visit, physical exam, education, counseling, developing plan of care, and documenting = 40 minutes

## 2021-08-04 ENCOUNTER — OFFICE VISIT (OUTPATIENT)
Dept: PEDIATRICS | Facility: CLINIC | Age: 66
End: 2021-08-04
Payer: COMMERCIAL

## 2021-08-04 VITALS
DIASTOLIC BLOOD PRESSURE: 76 MMHG | BODY MASS INDEX: 25.43 KG/M2 | WEIGHT: 138.2 LBS | HEART RATE: 65 BPM | HEIGHT: 62 IN | SYSTOLIC BLOOD PRESSURE: 116 MMHG | OXYGEN SATURATION: 99 % | RESPIRATION RATE: 16 BRPM | TEMPERATURE: 97.3 F

## 2021-08-04 DIAGNOSIS — I10 ESSENTIAL HYPERTENSION: ICD-10-CM

## 2021-08-04 DIAGNOSIS — H00.014 HORDEOLUM EXTERNUM OF LEFT UPPER EYELID: ICD-10-CM

## 2021-08-04 DIAGNOSIS — M25.562 ACUTE PAIN OF LEFT KNEE: ICD-10-CM

## 2021-08-04 DIAGNOSIS — E11.9 TYPE 2 DIABETES MELLITUS WITHOUT COMPLICATION, WITHOUT LONG-TERM CURRENT USE OF INSULIN (H): Primary | ICD-10-CM

## 2021-08-04 DIAGNOSIS — E78.5 HYPERLIPIDEMIA LDL GOAL <100: ICD-10-CM

## 2021-08-04 LAB — HBA1C MFR BLD: 6.8 % (ref 0–5.6)

## 2021-08-04 PROCEDURE — 99214 OFFICE O/P EST MOD 30 MIN: CPT | Performed by: INTERNAL MEDICINE

## 2021-08-04 PROCEDURE — 36415 COLL VENOUS BLD VENIPUNCTURE: CPT | Performed by: INTERNAL MEDICINE

## 2021-08-04 PROCEDURE — 83036 HEMOGLOBIN GLYCOSYLATED A1C: CPT | Performed by: INTERNAL MEDICINE

## 2021-08-04 ASSESSMENT — MIFFLIN-ST. JEOR: SCORE: 1294.37

## 2021-08-04 NOTE — PROGRESS NOTES
Assessment & Plan       ICD-10-CM    1. Type 2 diabetes mellitus without complication, without long-term current use of insulin (H)  E11.9 Hemoglobin A1c   2. Hordeolum externum of left upper eyelid  H00.014 Adult Eye Referral   3. Acute pain of left knee  M25.562 Knee Supplies Order for DME - ONLY FOR DME   4. Hyperlipidemia LDL goal <100  E78.5    5. Hypertension  I10      T2DM. Has been well controlled. Check A1C today.    Left upper eyelid lesion. Clinically likely stye. Warm compress TID. In case does not improve, ophthalmology referral placed.    Left knee pain. Concern for meniscal injury. Start w/ knee sleeve. If sx do not improve, call for FSOC referral.    Hyperlipid, HTN. CPM.       Return in about 26 weeks (around 2/2/2022) for Diabetes follow-Up.    Mazin Ford MD  [facility] ALMA ROSA Doe is a 66 year old who presents for the following health issues     HPI     Diabetes Follow-up    How often are you checking your blood sugar? One time daily  What time of day are you checking your blood sugars (select all that apply)?  Before meals  Have you had any blood sugars above 200?  No  Have you had any blood sugars below 70?  No    What symptoms do you notice when your blood sugar is low?  None    What concerns do you have today about your diabetes? None     Do you have any of these symptoms? (Select all that apply)  No numbness or tingling in feet.  No redness, sores or blisters on feet.  No complaints of excessive thirst.  No reports of blurry vision.  No significant changes to weight.   Numbness tingling in hands/fingers     Have you had a diabetic eye exam in the last 12 months? No    HTN. No cardiac sx such as CP, palpitations, PND, orthopnea, VILLALOBOS or peripheral edema.      BP Readings from Last 2 Encounters:   08/04/21 116/76   05/25/21 123/71     Hemoglobin A1C (%)   Date Value   02/05/2021 6.7 (H)   08/14/2020 7.3 (H)     LDL Cholesterol Calculated (mg/dL)   Date Value  "  02/05/2021 98   02/07/2020 93     Hyperlipidemia. Labs UTD.    Left upper eyelid swelling. Past few weeks. No pain.     Left knee pain. Began after stepping and twisted knee. Mild swelling. Pain anteromedial knee.         Objective    /76 (BP Location: Left arm, Patient Position: Sitting, Cuff Size: Adult Regular)   Pulse 65   Temp 97.3  F (36.3  C) (Tympanic)   Resp 16   Ht 1.572 m (5' 1.89\")   Wt 62.7 kg (138 lb 3.2 oz)   SpO2 99%   BMI 25.37 kg/m    Body mass index is 25.37 kg/m .  Physical Exam   GENERAL: healthy, alert and no distress  EYES: Left upper eyelid with cystic structure mid lid. PERRL. EOMI.   HENT: ear canals and TM's normal, nose and mouth without ulcers or lesions  NECK: no adenopathy, no asymmetry, masses, or scars and thyroid normal to palpation  RESP: lungs clear to auscultation - no rales, rhonchi or wheezes  CV: regular rate and rhythm, normal S1 S2, no S3 or S4, no murmur, click or rub, no peripheral edema and peripheral pulses strong  MS: left knee w/ mild effusion. Discomfort shannon-medial knee. FROM.   SKIN: no suspicious lesions or rashes  PSYCH: mentation appears normal, affect normal/bright  Diabetic foot exam: normal DP and PT pulses, no trophic changes or ulcerative lesions and normal sensory exam            "

## 2021-08-04 NOTE — PATIENT INSTRUCTIONS
Patient Education     Sty (or Stye)  A sty is when the oil gland of the eyelid becomes inflamed. It may develop into an infection with a small pocket of pus (an abscess). This can cause pain, redness, and swelling. In early stages, a sty is treated with antibiotic cream, eye drops, or a small towel soaked in warm water (a warm compress). More severe cases may need to be opened and drained by a healthcare provider.   Home care    Artificial tears may also be used to lubricate the eye and make it more comfortable. You can buy these over the counter without a prescription. Talk with your healthcare provider before using any over-the-counter treatment for a sty.    Apply a warm, damp towel to the affected area for at least 5 minutes, 3 to 4 times a day for a week. Warm compresses open the pores and speed the healing. Make sure the compresses are not too hot, as they may burn your eyelid.    Sometimes the sty will drain with this treatment alone. If this happens, keep using the antibiotic until all the redness and swelling are gone.    Wash your hands before and after touching the infected eyelid.    Don t squeeze or try to break open the sty.    Follow-up care  Call to schedule an ophthalmology appointment if the stye is not gone by next week      For your knee pain, wear the knee brace during the daytime  If your knee does not improve over the next 1-2 weeks, call and I can refer you to see a knee specialist

## 2021-09-05 ENCOUNTER — HEALTH MAINTENANCE LETTER (OUTPATIENT)
Age: 66
End: 2021-09-05

## 2021-09-29 ENCOUNTER — OFFICE VISIT (OUTPATIENT)
Dept: PEDIATRICS | Facility: CLINIC | Age: 66
End: 2021-09-29
Payer: COMMERCIAL

## 2021-09-29 VITALS
TEMPERATURE: 98 F | HEART RATE: 61 BPM | WEIGHT: 134 LBS | BODY MASS INDEX: 24.6 KG/M2 | SYSTOLIC BLOOD PRESSURE: 112 MMHG | OXYGEN SATURATION: 97 % | DIASTOLIC BLOOD PRESSURE: 70 MMHG

## 2021-09-29 DIAGNOSIS — M25.562 CHRONIC PAIN OF LEFT KNEE: ICD-10-CM

## 2021-09-29 DIAGNOSIS — G89.29 CHRONIC PAIN OF LEFT KNEE: ICD-10-CM

## 2021-09-29 DIAGNOSIS — H00.014 HORDEOLUM EXTERNUM LEFT UPPER EYELID: ICD-10-CM

## 2021-09-29 DIAGNOSIS — M25.512 ACUTE PAIN OF LEFT SHOULDER: ICD-10-CM

## 2021-09-29 DIAGNOSIS — Z23 NEED FOR VACCINATION: ICD-10-CM

## 2021-09-29 DIAGNOSIS — L30.9 DERMATITIS: Primary | ICD-10-CM

## 2021-09-29 PROCEDURE — G0008 ADMIN INFLUENZA VIRUS VAC: HCPCS | Performed by: INTERNAL MEDICINE

## 2021-09-29 PROCEDURE — 90682 RIV4 VACC RECOMBINANT DNA IM: CPT | Performed by: INTERNAL MEDICINE

## 2021-09-29 PROCEDURE — 99214 OFFICE O/P EST MOD 30 MIN: CPT | Mod: 25 | Performed by: INTERNAL MEDICINE

## 2021-09-29 RX ORDER — CLOTRIMAZOLE 1 %
CREAM (GRAM) TOPICAL 2 TIMES DAILY
Qty: 60 G | Refills: 1 | Status: SHIPPED | OUTPATIENT
Start: 2021-09-29

## 2021-09-29 RX ORDER — TRIAMCINOLONE ACETONIDE 1 MG/G
CREAM TOPICAL 2 TIMES DAILY
Qty: 60 G | Refills: 1 | Status: SHIPPED | OUTPATIENT
Start: 2021-09-29

## 2021-09-29 NOTE — PROGRESS NOTES
Assessment & Plan       ICD-10-CM    1. Dermatitis  L30.9 clotrimazole (LOTRIMIN) 1 % external cream     triamcinolone (KENALOG) 0.1 % external cream   2. Acute pain of left shoulder  M25.512 Orthopedic  Referral   3. Chronic pain of left knee  M25.562 Orthopedic  Referral    G89.29    4. Hordeolum externum left upper eyelid  H00.014    5. Need for vaccination  Z23 VA RIV4 (FLUBLOK) VACCINE RECOMBINANT DNA PRSRV ANTIBIO FREE, IM ([id number])     Left neck dermatitis. Clinically c/w fungal cause. Will treat with both clotrimazole and triamcinolone. If does not improve, recommend derm evaluation.    Left shoulder pain. Potentially rotator cuff origin. Discussed options. Will refer to [facility] to help with dx and management options.    Left knee pain. This is more chronic. Also will refer.    Stye left upper eyelid. Chronic. Needs ophthalmology evaluation.     Return in about 2 weeks (around 10/13/2021), or if symptoms worsen or fail to improve.    Mazin Ford MD  [facility] [place] ROSA Doe is a 66 year old who presents for the following health issues  accompanied by his son:    HPI     Concern - multiple concerns   Onset: neck issue started since 08/04, knee and shoulder issue started 2 days ago.   Description: left side neck feels tingly and twitches;   Intensity: mild, moderate  Progression of Symptoms:  worsening  Accompanying Signs & Symptoms: left shoulder felt heavy and is painful.   Previous history of similar problem: had cyst located on his chest previously and unsure if related or causing the twitching. left stye on eyelid that has not imporved.   Precipitating factors:        Worsened by: movement of the arm or lifting the arm   Alleviating factors:        Improved by: patches   Therapies tried and outcome: patch help temporarily     Skin changes on the Left side of neck.  Ongoing for at least the past few weeks.  Tried an OTC topical cream w/o change in sx. No  antifungal creams tried.     Also knee and shoulder pains.  Left knee. Anterior and medial knee.   Ongoing for several months.  Will give way at times.    Shoulder pain. Left shoulder.   Ongoing for 3 days.   No injury known. Did move his elliptical machine.   No swelling noted.     Stye left upper eyelid.  ongoing x months.             Objective    /70 (BP Location: Right arm, Patient Position: Sitting, Cuff Size: Adult Regular)   Pulse 61   Temp 98  F (36.7  C) (Temporal)   Wt 60.8 kg (134 lb)   SpO2 97%   BMI 24.60 kg/m    Body mass index is 24.6 kg/m .  Physical Exam   GEN: No distress  SKIN: pink large patch on the left lateral neck to the angle of the jaw. Few small satellite lesions at the upper aspect.   HEENT: Left upper eyelid mid aspect with stye noted.  NECK: Supple.  LUNGS: CTA danette  CV: RRR. No M.  MS: Left shoulder w/o significant impingement pain. No effusion noted. Rotator cuff movement with some increase in pain.   Left knee w/o effusion. Ligaments clinically intact. Pain over the medial aspect with several different movements.   NEURO: no focal deficits

## 2021-09-29 NOTE — PATIENT INSTRUCTIONS
Apply 2 creams to the affected area (both twice per day for 2 weeks)   Clotrimazole 1% cream    Anti-fungal cream   Triamcinolone 0.1% cream    Steroid cream to reduce inflammation and itching    If the rash is not improved within 2 weeks, call for a dermatology referral.      You will be contacted to schedule a sports medicine visit to further evaluate your shoulder and knee   For now, no exercises with the left shoulder

## 2021-10-12 NOTE — PROGRESS NOTES
ASSESSMENT & PLAN  Patient Instructions     1. Tendinitis of left rotator cuff    2. Acute pain of left shoulder    3. Chronic pain of left knee    4. Impingement syndrome of left shoulder      -Patient has left shoulder pain due to inflammation of rotator cuff muscles and bursa and left knee pain due to degeneration of the cartilage and possibly the medial meniscus  -Patient will start formal physical therapy and home exercise program  -Patient cannot take oral anti-inflammatories due to gastric issues.  Patient will take 1000 mg of Tylenol every 8 hours as needed for pain.  Patient will start Flexeril, a muscle relaxer at bedtime for nighttime pain.  Patient may also purchase over-the-counter 4% lidocaine patches to be applied to the left shoulder as needed  -Patient will follow up in 4 weeks for evaluation.  If no improvement in pain, to consider a left subacromial cortisone injection in possibly a left knee cortisone injection  -Call direct clinic number [[phone]] at any time with questions or concerns.    Albert Yeo MD [facility] Orthopedics and Sports Medicine  [facility]          -----    SUBJECTIVE  Nader Alfaro is a/an 66 year old male who is seen in consultation at the request of  Mazin Ford M.D. for evaluation of left knee and left shoulder pain. The patient is seen with their son.  Son is translating today. His son states his knee locks,  alexus and gives out on him a lot. Does not experience much pain, chief complaint is locking of knee.  Notes 3 weeks of constant left shoulder pain with movement of arm, son states he works out at home with machines daily.     Left knee:  Onset: 2 month(s) ago. Reports insidious onset without acute precipitating event.  Location of Pain: left knee, entire anterior knee  Rating of Pain at worst: 8/10  Rating of Pain Currently: 0/10  Worsened by: walking  Better with: activity avoidance, rest   Treatments tried: rest, Tylenol   Associated  "symptoms: locking, buckling, giving out  Orthopedic history: NO  Relevant surgical history: NO    Left Shoulder:  Onset: 3 week(s) ago. Reports insidious onset without acute precipitating event.  Location of Pain: left shoulder, patient states \"entire joint\"   Rating of Pain at worst: 9/10  Rating of Pain Currently: 8/10  Worsened by: lifting his arm - cannot raise arm above chest height   Better with: Lidocaine patches   Treatments tried: Lidocaine patches, ice  Associated symptoms: limited function and limited range of motion   Orthopedic history: NO  Relevant surgical history: NO  Social history: social history: retired    Past Medical History:   Diagnosis Date     Essential hypertension      Gastric cancer (H) 2012     GERD (gastroesophageal reflux disease)      Hyperlipidemia      Thyroid nodule 2020     Type 2 diabetes mellitus (H)      Social History     Socioeconomic History     Marital status:      Spouse name: Not on file     Number of children: Not on file     Years of education: Not on file     Highest education level: Not on file   Occupational History     Not on file   Tobacco Use     Smoking status: Former Smoker     Packs/day: 0.10     Years: 15.00     Pack years: 1.50     Types: Cigarettes     Quit date: 1990     Years since quittin.8     Smokeless tobacco: Never Used   Substance and Sexual Activity     Alcohol use: Not Currently     Alcohol/week: 0.0 standard drinks     Drug use: No     Sexual activity: Not Currently   Other Topics Concern     Parent/sibling w/ CABG, MI or angioplasty before 65F 55M? Not Asked   Social History Narrative     Not on file     Social Determinants of Health     Financial Resource Strain:      Difficulty of Paying Living Expenses:    Food Insecurity:      Worried About Running Out of Food in the Last Year:      Ran Out of Food in the Last Year:    Transportation Needs:      Lack of Transportation (Medical):      Lack of Transportation " "(Non-Medical):    Physical Activity:      Days of Exercise per Week:      Minutes of Exercise per Session:    Stress:      Feeling of Stress :    Social Connections:      Frequency of Communication with Friends and Family:      Frequency of Social Gatherings with Friends and Family:      Attends [employer] Services:      Active Member of Clubs or Organizations:      Attends Club or Organization Meetings:      Marital Status:    Intimate Partner Violence:      Fear of Current or Ex-Partner:      Emotionally Abused:      Physically Abused:      Sexually Abused:          Patient's past medical, surgical, social, and family histories were reviewed today and no changes are noted.    REVIEW OF SYSTEMS:  10 point ROS is negative other than symptoms noted above in HPI, Past Medical History or as stated below  Constitutional: NEGATIVE for fever, chills, change in weight  Skin: NEGATIVE for worrisome rashes, moles or lesions  GI/: NEGATIVE for bowel or bladder changes  Neuro: NEGATIVE for weakness, dizziness or paresthesias    OBJECTIVE:  /62   Ht 1.575 m (5' 2\")   Wt 60.8 kg (134 lb)   BMI 24.51 kg/m     General: healthy, alert and in no distress  HEENT: no scleral icterus or conjunctival erythema  Skin: no suspicious lesions or rash. No jaundice.  CV: no pedal edema  Resp: normal respiratory effort without conversational dyspnea   Psych: normal mood and affect  Gait: normal steady gait with appropriate coordination and balance  Neuro: Normal light sensory exam of lower extremity  MSK:  LEFT KNEE  Inspection:    normal alignment  Palpation:    Tender about the medial joint line. Remainder of bony and ligamentous landmarks are nontender.    No effusion is present    Patellofemoral crepitus is Absent  Range of Motion:     00 extension to 1200 flexion  Strength:    Quadriceps grossly intact    Extensor mechanism intact  Special Tests:    Positive: none    Negative: patellar apprehension, MCL/valgus stress (0 & 30 " deg), LCL/varus stress (0 & 30 deg), Lachman's, anterior drawer, posterior drawer, Karime's    LEFT SHOULDER  Inspection:    no swelling, bruising, discoloration, or obvious deformity or asymmetry  Palpation:    Tender about the proximal biceps tendon and supraspinatus insertion. Remainder of bony and tendinous landmarks are nontender.  Active Range of Motion:     Abduction 450, , , IR SI joint.      Scapular dyskinesis absent  Strength:    Scapular plane abduction limited by pain,  ER grossly intact, IR grossly intact, biceps grossly intact, triceps grossly intact  Special Tests:    Positive: Neer's, Luna', supraspinatus (empty can), crossed arm adduction and Merrick's    Negative: drop arm/painful arc, Speed's and Yergason's        Independent visualization of the below image:  Recent Results (from the past 24 hour(s))   XR Knee Standing AP Bilat [place] Bilat Lat Left    Narrative    No acute fracture, dislocation or osseous abnormalities.       X-rays taken office today of the left shoulder show no acute fracture, dislocation or osseous abnormalities.    Albert Yeo MD [facility] and Orthopedic Care

## 2021-10-13 ENCOUNTER — ANCILLARY PROCEDURE (OUTPATIENT)
Dept: GENERAL RADIOLOGY | Facility: CLINIC | Age: 66
End: 2021-10-13
Attending: FAMILY MEDICINE
Payer: COMMERCIAL

## 2021-10-13 ENCOUNTER — OFFICE VISIT (OUTPATIENT)
Dept: ORTHOPEDICS | Facility: CLINIC | Age: 66
End: 2021-10-13
Attending: INTERNAL MEDICINE
Payer: COMMERCIAL

## 2021-10-13 VITALS
DIASTOLIC BLOOD PRESSURE: 62 MMHG | HEIGHT: 62 IN | SYSTOLIC BLOOD PRESSURE: 102 MMHG | WEIGHT: 134 LBS | BODY MASS INDEX: 24.66 KG/M2

## 2021-10-13 DIAGNOSIS — M75.42 IMPINGEMENT SYNDROME OF LEFT SHOULDER: ICD-10-CM

## 2021-10-13 DIAGNOSIS — G89.29 CHRONIC PAIN OF LEFT KNEE: ICD-10-CM

## 2021-10-13 DIAGNOSIS — M25.562 CHRONIC PAIN OF LEFT KNEE: ICD-10-CM

## 2021-10-13 DIAGNOSIS — M25.512 ACUTE PAIN OF LEFT SHOULDER: ICD-10-CM

## 2021-10-13 DIAGNOSIS — M75.82 TENDINITIS OF LEFT ROTATOR CUFF: Primary | ICD-10-CM

## 2021-10-13 PROCEDURE — 73562 X-RAY EXAM OF KNEE 3: CPT | Performed by: FAMILY MEDICINE

## 2021-10-13 PROCEDURE — 73030 X-RAY EXAM OF SHOULDER: CPT | Mod: LT | Performed by: RADIOLOGY

## 2021-10-13 PROCEDURE — 99204 OFFICE O/P NEW MOD 45 MIN: CPT | Performed by: FAMILY MEDICINE

## 2021-10-13 PROCEDURE — 73565 X-RAY EXAM OF KNEES: CPT | Performed by: RADIOLOGY

## 2021-10-13 RX ORDER — CYCLOBENZAPRINE HCL 10 MG
10 TABLET ORAL
Qty: 30 TABLET | Refills: 0 | Status: SHIPPED | OUTPATIENT
Start: 2021-10-13 | End: 2024-08-05

## 2021-10-13 ASSESSMENT — MIFFLIN-ST. JEOR: SCORE: 1277.07

## 2021-10-13 NOTE — PATIENT INSTRUCTIONS
1. Tendinitis of left rotator cuff    2. Acute pain of left shoulder    3. Chronic pain of left knee    4. Impingement syndrome of left shoulder      -Patient has left shoulder pain due to inflammation of rotator cuff muscles and bursa and left knee pain due to degeneration of the cartilage and possibly the medial meniscus  -Patient will start formal physical therapy and home exercise program  -Patient cannot take oral anti-inflammatories due to gastric issues.  Patient will take 1000 mg of Tylenol every 8 hours as needed for pain.  Patient will start Flexeril, a muscle relaxer at bedtime for nighttime pain.  Patient may also purchase over-the-counter 4% lidocaine patches to be applied to the left shoulder as needed  -Patient will follow up in 4 weeks for evaluation.  If no improvement in pain, to consider a left subacromial cortisone injection in possibly a left knee cortisone injection  -Call direct clinic number [[phone]] at any time with questions or concerns.    Albert Yeo MD [facility]  [facility] [place]

## 2021-10-13 NOTE — LETTER
10/13/2021         RE: Nader Alfaro  [address] [address] [address]        Dear Colleague,    Thank you for referring your patient, Nader Alfaro, to the [facility] [place]. Please see a copy of my visit note below.    ASSESSMENT & PLAN  Patient Instructions     1. Tendinitis of left rotator cuff    2. Acute pain of left shoulder    3. Chronic pain of left knee    4. Impingement syndrome of left shoulder      -Patient has left shoulder pain due to inflammation of rotator cuff muscles and bursa and left knee pain due to degeneration of the cartilage and possibly the medial meniscus  -Patient will start formal physical therapy and home exercise program  -Patient cannot take oral anti-inflammatories due to gastric issues.  Patient will take 1000 mg of Tylenol every 8 hours as needed for pain.  Patient will start Flexeril, a muscle relaxer at bedtime for nighttime pain.  Patient may also purchase over-the-counter 4% lidocaine patches to be applied to the left shoulder as needed  -Patient will follow up in 4 weeks for evaluation.  If no improvement in pain, to consider a left subacromial cortisone injection in possibly a left knee cortisone injection  -Call direct clinic number [[phone]] at any time with questions or concerns.    Albert Yeo MD [facility] Orthopedics and Sports Medicine  [facility] [place]          -----    SUBJECTIVE  Nader Alfaro is a/an 66 year old male who is seen in consultation at the request of  Mazin Ford M.D. for evaluation of left knee and left shoulder pain. The patient is seen with their son.  Son is translating today. His son states his knee locks,  alexus and gives out on him a lot. Does not experience much pain, chief complaint is locking of knee.  Notes 3 weeks of constant left shoulder pain with movement of arm, son states he works out at home with machines daily.     Left knee:  Onset: 2 month(s) ago. Reports insidious  "onset without acute precipitating event.  Location of Pain: left knee, entire anterior knee  Rating of Pain at worst: 8/10  Rating of Pain Currently: 0/10  Worsened by: walking  Better with: activity avoidance, rest   Treatments tried: rest, Tylenol   Associated symptoms: locking, buckling, giving out  Orthopedic history: NO  Relevant surgical history: NO    Left Shoulder:  Onset: 3 week(s) ago. Reports insidious onset without acute precipitating event.  Location of Pain: left shoulder, patient states \"entire joint\"   Rating of Pain at worst: 9/10  Rating of Pain Currently: 8/10  Worsened by: lifting his arm - cannot raise arm above chest height   Better with: Lidocaine patches   Treatments tried: Lidocaine patches, ice  Associated symptoms: limited function and limited range of motion   Orthopedic history: NO  Relevant surgical history: NO  Social history: social history: retired    Past Medical History:   Diagnosis Date     Essential hypertension      Gastric cancer (H) 2012     GERD (gastroesophageal reflux disease)      Hyperlipidemia      Thyroid nodule 2020     Type 2 diabetes mellitus (H)      Social History     Socioeconomic History     Marital status:      Spouse name: Not on file     Number of children: Not on file     Years of education: Not on file     Highest education level: Not on file   Occupational History     Not on file   Tobacco Use     Smoking status: Former Smoker     Packs/day: 0.10     Years: 15.00     Pack years: 1.50     Types: Cigarettes     Quit date: 1990     Years since quittin.8     Smokeless tobacco: Never Used   Substance and Sexual Activity     Alcohol use: Not Currently     Alcohol/week: 0.0 standard drinks     Drug use: No     Sexual activity: Not Currently   Other Topics Concern     Parent/sibling w/ CABG, MI or angioplasty before 65F 55M? Not Asked   Social History Narrative     Not on file     Social Determinants of Health     Financial Resource " "Strain:      Difficulty of Paying Living Expenses:    Food Insecurity:      Worried About Running Out of Food in the Last Year:      Ran Out of Food in the Last Year:    Transportation Needs:      Lack of Transportation (Medical):      Lack of Transportation (Non-Medical):    Physical Activity:      Days of Exercise per Week:      Minutes of Exercise per Session:    Stress:      Feeling of Stress :    Social Connections:      Frequency of Communication with Friends and Family:      Frequency of Social Gatherings with Friends and Family:      Attends [employer] Services:      Active Member of Clubs or Organizations:      Attends Club or Organization Meetings:      Marital Status:    Intimate Partner Violence:      Fear of Current or Ex-Partner:      Emotionally Abused:      Physically Abused:      Sexually Abused:          Patient's past medical, surgical, social, and family histories were reviewed today and no changes are noted.    REVIEW OF SYSTEMS:  10 point ROS is negative other than symptoms noted above in HPI, Past Medical History or as stated below  Constitutional: NEGATIVE for fever, chills, change in weight  Skin: NEGATIVE for worrisome rashes, moles or lesions  GI/: NEGATIVE for bowel or bladder changes  Neuro: NEGATIVE for weakness, dizziness or paresthesias    OBJECTIVE:  /62   Ht 1.575 m (5' 2\")   Wt 60.8 kg (134 lb)   BMI 24.51 kg/m     General: healthy, alert and in no distress  HEENT: no scleral icterus or conjunctival erythema  Skin: no suspicious lesions or rash. No jaundice.  CV: no pedal edema  Resp: normal respiratory effort without conversational dyspnea   Psych: normal mood and affect  Gait: normal steady gait with appropriate coordination and balance  Neuro: Normal light sensory exam of lower extremity  MSK:  LEFT KNEE  Inspection:    normal alignment  Palpation:    Tender about the medial joint line. Remainder of bony and ligamentous landmarks are nontender.    No effusion is " present    Patellofemoral crepitus is Absent  Range of Motion:     00 extension to 1200 flexion  Strength:    Quadriceps grossly intact    Extensor mechanism intact  Special Tests:    Positive: none    Negative: patellar apprehension, MCL/valgus stress (0 & 30 deg), LCL/varus stress (0 & 30 deg), Lachman's, anterior drawer, posterior drawer, Karime's    LEFT SHOULDER  Inspection:    no swelling, bruising, discoloration, or obvious deformity or asymmetry  Palpation:    Tender about the proximal biceps tendon and supraspinatus insertion. Remainder of bony and tendinous landmarks are nontender.  Active Range of Motion:     Abduction 450, , , IR SI joint.      Scapular dyskinesis absent  Strength:    Scapular plane abduction limited by pain,  ER grossly intact, IR grossly intact, biceps grossly intact, triceps grossly intact  Special Tests:    Positive: Neer's, Luna', supraspinatus (empty can), crossed arm adduction and Dyer's    Negative: drop arm/painful arc, Speed's and Yergason's        Independent visualization of the below image:  Recent Results (from the past 24 hour(s))   XR Knee Standing AP Bilat [place] Bilat Lat Left    Narrative    No acute fracture, dislocation or osseous abnormalities.       X-rays taken office today of the left shoulder show no acute fracture, dislocation or osseous abnormalities.    Albert Yeo MD [facility] Orthopedic [facility]        Again, thank you for allowing me to participate in the care of your patient.        Sincerely,        Albert Yeo, MD

## 2021-10-18 ENCOUNTER — TELEPHONE (OUTPATIENT)
Dept: ORTHOPEDICS | Facility: CLINIC | Age: 66
End: 2021-10-18

## 2021-10-18 NOTE — TELEPHONE ENCOUNTER
Prior Authorization Retail Medication Request    Medication/Dose: Cyclobenzaprine 10mg  ICD code (if different than what is on RX):  M25.512  Previously Tried and Failed: Has tried Tylenol, cannot take oral anti-inflammatories  Rationale:  Patient cannot take oral anti-inflammatories due to gastric issues. Patient will start Flexeril, a muscle relaxer at bedtime for nighttime pain.     Insurance Name:  BCBS Medicare Advantage  Insurance ID:  [id number]       Pharmacy Information (if different than what is on RX)  Name:  [facility]  Phone:  [phone]

## 2021-10-20 NOTE — TELEPHONE ENCOUNTER
Prior Authorization Approval    Authorization Effective Date: 7/22/2021  Authorization Expiration Date: 10/20/2022  Medication: cyclobenzaprine (FLEXERIL) 10 MG tablet--APPROVED  Approved Dose/Quantity:   Reference #:     Insurance Company: [employer] - Phone [phone] Fax [phone]  Expected CoPay:       CoPay Card Available:      Foundation Assistance Needed:    Which Pharmacy is filling the prescription (Not needed for infusion/clinic administered): [id number] [place], [place] - [address] AT [place] [address] & [facility]  Pharmacy Notified: Yes  Patient Notified: Yes **Instructed pharmacy to notify patient when script is ready to /ship.**

## 2021-10-20 NOTE — TELEPHONE ENCOUNTER
PA Initiation    Medication: cyclobenzaprine (FLEXERIL) 10 MG tablet   Insurance Company: [employer] - Phone [phone] Fax [phone]  Pharmacy Filling the Rx: [id number] [place], [place] - [address] AT [place] [address] & [facility]  Filling Pharmacy Phone: [phone]  Filling Pharmacy Fax: [phone]  Start Date: 10/20/2021

## 2021-10-21 ENCOUNTER — THERAPY VISIT (OUTPATIENT)
Dept: PHYSICAL THERAPY | Facility: CLINIC | Age: 66
End: 2021-10-21
Attending: FAMILY MEDICINE
Payer: COMMERCIAL

## 2021-10-21 DIAGNOSIS — M25.562 CHRONIC PAIN OF LEFT KNEE: ICD-10-CM

## 2021-10-21 DIAGNOSIS — M75.82 TENDINITIS OF LEFT ROTATOR CUFF: ICD-10-CM

## 2021-10-21 DIAGNOSIS — G89.29 CHRONIC PAIN OF LEFT KNEE: ICD-10-CM

## 2021-10-21 DIAGNOSIS — M75.42 IMPINGEMENT SYNDROME OF LEFT SHOULDER: ICD-10-CM

## 2021-10-21 DIAGNOSIS — M25.512 LEFT SHOULDER PAIN: ICD-10-CM

## 2021-10-21 PROCEDURE — 97110 THERAPEUTIC EXERCISES: CPT | Mod: GP | Performed by: PHYSICAL THERAPIST

## 2021-10-21 PROCEDURE — 97112 NEUROMUSCULAR REEDUCATION: CPT | Mod: GP | Performed by: PHYSICAL THERAPIST

## 2021-10-21 PROCEDURE — 97162 PT EVAL MOD COMPLEX 30 MIN: CPT | Mod: GP | Performed by: PHYSICAL THERAPIST

## 2021-10-21 ASSESSMENT — ACTIVITIES OF DAILY LIVING (ADL)
KNEE_ACTIVITY_OF_DAILY_LIVING_SUM: 22
STAND: NOT ANSWERED
LIMPING: I HAVE THE SYMPTOM BUT IT DOES NOT AFFECT MY ACTIVITY
GO UP STAIRS: NOT ANSWERED
STIFFNESS: I HAVE THE SYMPTOM BUT IT DOES NOT AFFECT MY ACTIVITY
GIVING WAY, BUCKLING OR SHIFTING OF KNEE: THE SYMPTOM AFFECTS MY ACTIVITY MODERATELY
WALK: NOT ANSWERED
KNEEL ON THE FRONT OF YOUR KNEE: NOT ANSWERED
GO DOWN STAIRS: NOT ANSWERED
WEAKNESS: I HAVE THE SYMPTOM BUT IT DOES NOT AFFECT MY ACTIVITY
SIT WITH YOUR KNEE BENT: NOT ANSWERED
SWELLING: I DO NOT HAVE THE SYMPTOM
RISE FROM A CHAIR: NOT ANSWERED
SQUAT: NOT ANSWERED
PAIN: I HAVE THE SYMPTOM BUT IT DOES NOT AFFECT MY ACTIVITY

## 2021-10-21 NOTE — PROGRESS NOTES
[facility] Initial Evaluation -- Upper Extremity    Evaluation Date: October 21, 2021  Nader Alfaro is a 66 year old male with a L shoulder condition.   Referral: Dr. Yeo  Work mechanical stresses: retired  Employment status:  n/a  Leisure mechanical stresses: home workouts, yards work  Functional disability score (SPADI): see flow sheet  VAS score (0-10): 7/10  Handedness (R/L):  R  Patient goals/expectations:  To get pain relief    HISTORY    Present symptoms: anterior posterior.    Pain quality (sharp/shooting/stabbing/aching/burning/cramping):  Aching, sharp    Present since (onset date):  September 2021    Symptoms (improving/unchanging/worsening):  improving.    Symptoms commenced as a result of: no apparent reason   Condition occurred in the following environment: home    Symptoms at onset: L shoulder  Paresthesia (yes/no):  yes  Spinal history: none   Cough/Sneeze (pos/neg):  neg    Constant symptoms:   Intermittent symptoms: L shoulder    Symptoms are worse with the following: Always Dressing, Always Reaching and Always Sleeping (prone/sup/side R/L) - L   Symptoms are better with the following: Always When still and Other - muscle relaxant    Continued use makes the pain (better/worse/no effect): worse    Disturbed night (yes/no):  yes    Pain at rest (yes/no): no  Site (neck/shoulder/elbow/wrist/hand): L shoulder    Other questions (swelling/catching/clicking/locking/subluxing):      Previous episodes: none previous  Previous treatments: none    Specific Questions:  General health (excellent/good/fair/poor):  good  Pertinent medical history includes: Cancer, Diabetes and High blood pressure  Medications (nil/NSAIDS/analg/steroids/anticoag/other):  OTC analgesic and Muscle relaxants  Medical allergies:  none  Imaging (None/Xray/MRI/Other): xrays  Recent or major surgery (yes/no): no  Night pain (yes/no): yes   Accidents (yes/no): no  Unexplained weight loss (yes/no): none  Barriers at  home: none  Other red flags: none    Sites for physical examination (neck/shoulder/elbow/wrist/hand): L shoulder    EXAMINATION    Posture:  Sitting (good/fair/poor): poor  Correction of posture (better/worse/no effect/NA): no effecgt  Standing (good/fair/poor): n/a  Other observations:  n/a    Neurological (NA/motor/sensory/reflexes/dural): +ULNTT L    Baselines (pain or functional activity): reaching    Extremities (Shoulder/Elbow/Wrist/Hand): L shoulder    Movement Loss Emile Mod Min Nil Pain   Flexion   x     Extension   x     Abduction   x     Internal Rotation/IR  X top of L buttocks x     External Rotation   x     Supination        Pronation        Radial Deviation        Ulnar Deviation           Passive Movement (+/- overpressure)/(PDM/ERP):  erp w/passive flex, abd, ER, EXT  Resisted Test Response (pain): MMT L ER=4+/5, painfree  Other Tests:     Spine:  Movement Loss: L Rot=mod loss, RET=mod to major loss, EXT=major loss  Effect of repeated movements: rep RET + PT op: decr L UE NT/better/incr ROM L Cx RET, L Rot, L UE elevation, ABD; improved MMT ER  Effect of static positioning:   Spine testing (not relevant/relevant/secondary problem): relevant    Baseline Symptoms:   Repeated Tests Symptom Response Mechanical Response   Active/Passive movement, resisted test, functional test During - Produce, Abolish, Increase, Decrease, NE After -   Better, Worse, NB, NW, NE Effect -   ? or ? ROM, strength or key functional test No Effect                               Effect of static positioning                  Provisional Classification (Extremity/Spine): Spine - Derangement - Asymmetrical, unilateral, symptoms below elbow and Extremity - Derangement      Principle of Management:  Education:  Posture, origin of NT and L arm pain;   Equipment provided:  Suggested low back support/posture correction at home  Exercise and dosage:  Rep RET in supine progressing to sitting as pt. Has great difficulty  understanding/performing seated RET.     ASSESSMENT/PLAN:    Son present to interpret--patient has significant hearing loss making communication difficult; unable to perform L knee evaluation due to time.  Patient is a 66 year old male with left side shoulder complaints.    Patient has the following significant findings with corresponding treatment plan.                Diagnosis 1:  L shoulder pain  Pain -  manual therapy, self management, education, directional preference exercise and home program  Decreased ROM/flexibility - manual therapy and therapeutic exercise  Decreased joint mobility - manual therapy and therapeutic exercise  Decreased strength - therapeutic exercise and therapeutic activities  Decreased function - therapeutic activities  Impaired posture - neuro re-education    Therapy Evaluation Codes:   1) History comprised of:   Personal factors that impact the plan of care:      Language.    Comorbidity factors that impact the plan of care are:      Cancer, Diabetes, High blood pressure and hearing loss.     Medications impacting care: High blood pressure and Muscle relaxant.  2) Examination of Body Systems comprised of:   Body structures and functions that impact the plan of care:      Cervical spine and Shoulder.   Activity limitations that impact the plan of care are:      Dressing, Working and Sleeping.  3) Clinical presentation characteristics are:   Evolving/Changing.  4) Decision-Making    Moderate complexity using standardized patient assessment instrument and/or measureable assessment of functional outcome.  Cumulative Therapy Evaluation is: Moderate complexity.    Previous and current functional limitations:  (See Goal Flow Sheet for this information)    Short term and Long term goals: (See Goal Flow Sheet for this information)     Communication ability:  Patient appears to be able to clearly communicate and understand verbal and written communication and follow directions correctly.  Patient  has an  for communication clarity.  Treatment Explanation - The following has been discussed with the patient:   RX ordered/plan of care  Anticipated outcomes  Possible risks and side effects  This patient would benefit from PT intervention to resume normal activities.   Rehab potential is good.    Frequency:  1 X week, once daily  Duration:  for 6 weeks  Discharge Plan:  Achieve all LTG.  Independent in home treatment program.  Reach maximal therapeutic benefit.    Please refer to the daily flowsheet for treatment today, total treatment time and time spent performing 1:1 timed codes.

## 2021-10-28 ENCOUNTER — THERAPY VISIT (OUTPATIENT)
Dept: PHYSICAL THERAPY | Facility: CLINIC | Age: 66
End: 2021-10-28
Payer: COMMERCIAL

## 2021-10-28 DIAGNOSIS — M25.512 ACUTE PAIN OF LEFT SHOULDER: ICD-10-CM

## 2021-10-28 PROCEDURE — 97112 NEUROMUSCULAR REEDUCATION: CPT | Mod: GP | Performed by: PHYSICAL THERAPY ASSISTANT

## 2021-10-28 PROCEDURE — 97110 THERAPEUTIC EXERCISES: CPT | Mod: GP | Performed by: PHYSICAL THERAPY ASSISTANT

## 2021-12-03 ENCOUNTER — OFFICE VISIT (OUTPATIENT)
Dept: OTHER | Facility: CLINIC | Age: 66
End: 2021-12-03
Attending: SURGERY
Payer: COMMERCIAL

## 2021-12-03 ENCOUNTER — HOSPITAL ENCOUNTER (OUTPATIENT)
Dept: ULTRASOUND IMAGING | Facility: CLINIC | Age: 66
End: 2021-12-03
Attending: SURGERY
Payer: COMMERCIAL

## 2021-12-03 VITALS — DIASTOLIC BLOOD PRESSURE: 75 MMHG | RESPIRATION RATE: 16 BRPM | HEART RATE: 81 BPM | SYSTOLIC BLOOD PRESSURE: 115 MMHG

## 2021-12-03 DIAGNOSIS — I72.3 ILIAC ARTERY ANEURYSM, RIGHT (H): ICD-10-CM

## 2021-12-03 DIAGNOSIS — I72.3 ILIAC ARTERY ANEURYSM, RIGHT (H): Primary | ICD-10-CM

## 2021-12-03 PROCEDURE — 93978 VASCULAR STUDY: CPT

## 2021-12-03 PROCEDURE — 99215 OFFICE O/P EST HI 40 MIN: CPT | Performed by: SURGERY

## 2021-12-03 PROCEDURE — G0463 HOSPITAL OUTPT CLINIC VISIT: HCPCS

## 2021-12-03 PROCEDURE — 93978 VASCULAR STUDY: CPT | Mod: 26 | Performed by: SURGERY

## 2021-12-03 NOTE — PROGRESS NOTES
[facility]        Patient is here for a follow up  to discuss about US results      /75 (BP Location: Left arm, Patient Position: Chair, Cuff Size: Adult Regular)   Pulse 81   Resp 16     The provider has been notified that the patient has no concerns.     Questions patient would like addressed today are: N/A.    Refills are needed: No    Has homecare services and agency name:  Donna Winter [facility]

## 2021-12-03 NOTE — PROGRESS NOTES
Vascular Surgery Progress Note     Date: December 3, 2021     Reason for Visit:  Longitudinal follow-up of iliac aneurysms    Subjective:  Mr. Doe reports that he has had left hand numbness, occasionally right hand numbness, to the point where it is difficult to feel the fingertips. He has been seen in the last few months by Orthopedics for left rotator cuff tendonitis and left knee degnerative disease. He notices the hand numbness occurs from both positional changes (like resting on his left arm) and occurs while doing housework. It seems to resolve spontaneously, without clear alleviating factors. The physical therapy did help his left shoulder feel better. The finger symptoms have been going on for 4-5 years.       Current Outpatient Medications:      blood glucose (NO BRAND SPECIFIED) lancets standard, Use to test blood sugar daily or as directed., Disp: 100 each, Rfl: 3     blood glucose (NO BRAND SPECIFIED) test strip, Use to test blood sugar daily or as directed., Disp: 100 strip, Rfl: 3     blood glucose monitoring (NO BRAND SPECIFIED) meter device kit, Use to test blood sugar daily or as directed., Disp: 1 kit, Rfl: 1     cetirizine (ZYRTEC) 10 MG tablet, Take 10 mg by mouth daily, Disp: , Rfl:      clotrimazole (LOTRIMIN) 1 % external cream, Apply topically 2 times daily, Disp: 60 g, Rfl: 1     cyclobenzaprine (FLEXERIL) 10 MG tablet, Take 1 tablet (10 mg) by mouth nightly as needed for muscle spasms, Disp: 30 tablet, Rfl: 0     glipiZIDE (GLUCOTROL XL) 5 MG 24 hr tablet, Take 1 tablet (5 mg) by mouth daily, Disp: 90 tablet, Rfl: 3     lisinopril (ZESTRIL) 5 MG tablet, Take 1 tablet (5 mg) by mouth daily, Disp: 90 tablet, Rfl: 3     lovastatin (MEVACOR) 40 MG tablet, TAKE ONE TABLET BY MOUTH AT BEDTIME, Disp: 90 tablet, Rfl: 3     metFORMIN (GLUCOPHAGE) 1000 MG tablet, TAKE ONE TABLET BY MOUTH TWICE A DAY WITH MEALS (BREAKFAST AND DINNER), Disp: 180 tablet, Rfl: 3     omeprazole (PRILOSEC) 40 MG DR  "capsule, TAKE ONE CAPSULE BY MOUTH EVERY DAY; TAKE 30 TO 60 MINUTES BEFORE A MEAL, Disp: 90 capsule, Rfl: 3     triamcinolone (KENALOG) 0.1 % external cream, Apply topically 2 times daily, Disp: 60 g, Rfl: 1     Physical Exam       BP: 115/75 Pulse: 81   Resp: 16        Vital Signs with Ranges  Pulse:  [81] 81  Resp:  [16] 16  BP: (115)/(75) 115/75  0 lbs 0 oz    Constitutional: cooperative, no apparent distress, sitting comfortably in chair. Accompanied by his daughter, who provides interpretation during the visit.  Vascular: Bilateral radial pulses palpable and symmetric; BL DP and PT pulses palpable and symmetric.  Musculoskeletal: grossly normal and symmetric ROM and strength in BL extremities. Symmetric  strength, symmetric finger abduction/adduction.  Neurologic: Awake, alert, oriented to name, place, time, and situation    Imaging:  I have reviewed the following imaging studies:  US Aorta (12/3/21): \"1. Ectasia of the infrarenal abdominal aorta, measuring 2.97 cm in greatest dimension.  2. Aneurysm of the right common iliac artery, measuring 2.97 x 2.80 cm in maximal diameter. This is unchanged compared to prior CT.  3. Ectasia of the left common and external iliac arteries.\"    CTA abd/pelvis (12/8/21 -prior imaging; see also CT from 5/10/21-read available): \"Vascular examination: Again identified is a right common iliac artery aneurysm. This has maximum anterior-posterior and transverse dimensions of approximately 3.0 x 2.9 cm. These measurements are not significantly changed when compared to previous Exam. The iliac arteries are otherwise patent without significant stenoses. Again noted is mild ectasia of the distal abdominal aorta which has a maximum diameter of approximately 2.6 cm. There is a moderate stenosis at the origin of the celiac trunk. The superior mesenteric artery, renal arteries, and inferior mesenteric artery are patent without significant stenoses.\"       Assessment & Plan   Mr. Alfaro is " a 65 year old male with history of HTN, DL, remote history of gastric adenocarcinoma (stage II, T2 N1 M0, s/p Bilroth 1 partial gastrectomy in 2012) and well-controlled DM, who presents for follow-up evaluation of a right common iliac artery aneurysm. His right ROSINA is approximately 3.0 cm in greatest diameter, and aneurysmal enlargement appears to extend to both the aortic bifurcation and the iliac bifurcation.       Reviewed the imaging with Mr. Alfaro and his daughter.     No significant changes in aneurysm size seen on duplex, with what appears to be accurate sizing despite change in imaging modalities (CT vs US).    Will plan to repeat duplex aorta and iliac arteries in 6 months.    Threshold for repair at 35 mm.     Marlys Luna    Total time spent on the date of this encounter doing: chart review, review of test results, patient visit, physical exam, education, counseling, developing plan of care, and documenting = 40 minutes

## 2021-12-23 PROBLEM — M25.512 LEFT SHOULDER PAIN: Status: RESOLVED | Noted: 2021-10-21 | Resolved: 2021-12-23

## 2021-12-23 NOTE — PROGRESS NOTES
Subjective:  HPI  Physical Exam                    Objective:  System    Physical Exam    General     ROS    Assessment/Plan:    DISCHARGE REPORT    Progress reporting period is from 10-21-21 to 10-28-21.      SUBJECTIVE  Subjective changes noted by patient:  Patient reports that he is feeling better.  He has been moving and doing a lot of organization of the house.  Limited lifting. Patient does not have any numbness but has arthritis and the hands and knees are stiff.  Patient does exercises and does do the elliptical everyday.   Current pain level is .       Previous pain level was:   Initial Pain level: 7/10 (w/reaching)   Changes in function:  Yes (See Goal flowsheet attached for changes in current functional level) Changes in function: Yes, see goal flow sheet for change in function   Adverse reaction to treatment or activity: None     OBJECTIVE  Changes noted in objective findings:  Patient has failed to return to therapy so current objective findings are unknown.  Objective: Discussed with patient proper posture throughout the day and with the exercises.  Advanced the exercises today.

## 2021-12-23 NOTE — PROGRESS NOTES
Subjective:  HPI  Physical Exam                    Objective:  System    Physical Exam    General     ROS    Assessment/Plan:    ASSESSMENT/PLAN  Updated problem list and treatment plan: Diagnosis 1:  L shoulder/knee pain  Pain -  self management, education, directional preference exercise and home program  Decreased ROM/flexibility - manual therapy and therapeutic exercise  Decreased joint mobility - manual therapy and therapeutic exercise  Decreased strength - therapeutic exercise and therapeutic activities  Decreased function - therapeutic activities  STG/LTGs have been met:  None  Progress toward STG/LTGs have been made:  None  Assessment of Progress: The patient has not returned to therapy. Current status is unknown.  Self Management Plans:  Patient is independent in a home treatment program.  Patient is independent in self management of symptoms.  I have re-evaluated this patient and find that the nature, scope, duration and intensity of the therapy is appropriate for the medical condition of the patient.  Doe continues to require the following intervention to meet STG and LT's:  PT intervention is no longer required to meet STG/LTG.    Recommendations:  This patient is ready to be discharged from therapy and continue their home treatment program.    Please refer to the daily flowsheet for treatment today, total treatment time and time spent performing 1:1 timed codes.

## 2022-01-24 ENCOUNTER — DOCUMENTATION ONLY (OUTPATIENT)
Dept: LAB | Facility: CLINIC | Age: 67
End: 2022-01-24
Payer: COMMERCIAL

## 2022-01-24 DIAGNOSIS — E78.5 HYPERLIPIDEMIA LDL GOAL <100: ICD-10-CM

## 2022-01-24 DIAGNOSIS — E04.1 THYROID NODULE: ICD-10-CM

## 2022-01-24 DIAGNOSIS — E11.9 TYPE 2 DIABETES MELLITUS WITHOUT COMPLICATION, WITHOUT LONG-TERM CURRENT USE OF INSULIN (H): Primary | ICD-10-CM

## 2022-01-31 ENCOUNTER — LAB (OUTPATIENT)
Dept: LAB | Facility: CLINIC | Age: 67
End: 2022-01-31
Payer: COMMERCIAL

## 2022-01-31 DIAGNOSIS — E04.1 THYROID NODULE: ICD-10-CM

## 2022-01-31 DIAGNOSIS — E78.5 HYPERLIPIDEMIA LDL GOAL <100: ICD-10-CM

## 2022-01-31 DIAGNOSIS — E11.9 TYPE 2 DIABETES MELLITUS WITHOUT COMPLICATION, WITHOUT LONG-TERM CURRENT USE OF INSULIN (H): ICD-10-CM

## 2022-01-31 LAB
ALBUMIN SERPL-MCNC: 3.6 G/DL (ref 3.4–5)
ALP SERPL-CCNC: 62 U/L (ref 40–150)
ALT SERPL W P-5'-P-CCNC: 29 U/L (ref 0–70)
ANION GAP SERPL CALCULATED.3IONS-SCNC: 5 MMOL/L (ref 3–14)
AST SERPL W P-5'-P-CCNC: 20 U/L (ref 0–45)
BILIRUB SERPL-MCNC: 0.5 MG/DL (ref 0.2–1.3)
BUN SERPL-MCNC: 16 MG/DL (ref 7–30)
CALCIUM SERPL-MCNC: 8.9 MG/DL (ref 8.5–10.1)
CHLORIDE BLD-SCNC: 103 MMOL/L (ref 94–109)
CHOLEST SERPL-MCNC: 161 MG/DL
CO2 SERPL-SCNC: 28 MMOL/L (ref 20–32)
CREAT SERPL-MCNC: 1.06 MG/DL (ref 0.66–1.25)
CREAT UR-MCNC: 47 MG/DL
FASTING STATUS PATIENT QL REPORTED: NO
GFR SERPL CREATININE-BSD FRML MDRD: 78 ML/MIN/1.73M2
GLUCOSE BLD-MCNC: 210 MG/DL (ref 70–99)
HBA1C MFR BLD: 7.1 % (ref 0–5.6)
HDLC SERPL-MCNC: 50 MG/DL
LDLC SERPL CALC-MCNC: 88 MG/DL
MICROALBUMIN UR-MCNC: 64 MG/L
MICROALBUMIN/CREAT UR: 136.17 MG/G CR (ref 0–17)
NONHDLC SERPL-MCNC: 111 MG/DL
POTASSIUM BLD-SCNC: 4.7 MMOL/L (ref 3.4–5.3)
PROT SERPL-MCNC: 7.1 G/DL (ref 6.8–8.8)
SODIUM SERPL-SCNC: 136 MMOL/L (ref 133–144)
T4 FREE SERPL-MCNC: 0.9 NG/DL (ref 0.76–1.46)
TRIGL SERPL-MCNC: 114 MG/DL
TSH SERPL DL<=0.005 MIU/L-ACNC: 4.33 MU/L (ref 0.4–4)

## 2022-01-31 PROCEDURE — 84443 ASSAY THYROID STIM HORMONE: CPT

## 2022-01-31 PROCEDURE — 80061 LIPID PANEL: CPT

## 2022-01-31 PROCEDURE — 36415 COLL VENOUS BLD VENIPUNCTURE: CPT

## 2022-01-31 PROCEDURE — 83036 HEMOGLOBIN GLYCOSYLATED A1C: CPT

## 2022-01-31 PROCEDURE — 82043 UR ALBUMIN QUANTITATIVE: CPT

## 2022-01-31 PROCEDURE — 84439 ASSAY OF FREE THYROXINE: CPT

## 2022-01-31 PROCEDURE — 80053 COMPREHEN METABOLIC PANEL: CPT

## 2022-02-02 ENCOUNTER — OFFICE VISIT (OUTPATIENT)
Dept: PEDIATRICS | Facility: CLINIC | Age: 67
End: 2022-02-02
Payer: COMMERCIAL

## 2022-02-02 VITALS
DIASTOLIC BLOOD PRESSURE: 74 MMHG | OXYGEN SATURATION: 98 % | BODY MASS INDEX: 25.55 KG/M2 | SYSTOLIC BLOOD PRESSURE: 118 MMHG | WEIGHT: 139.7 LBS | HEART RATE: 64 BPM | TEMPERATURE: 97.5 F | RESPIRATION RATE: 12 BRPM

## 2022-02-02 DIAGNOSIS — I10 ESSENTIAL HYPERTENSION: ICD-10-CM

## 2022-02-02 DIAGNOSIS — I72.3 ILIAC ARTERY ANEURYSM, RIGHT (H): ICD-10-CM

## 2022-02-02 DIAGNOSIS — E78.5 HYPERLIPIDEMIA LDL GOAL <100: ICD-10-CM

## 2022-02-02 DIAGNOSIS — E11.9 TYPE 2 DIABETES MELLITUS WITHOUT COMPLICATION, WITHOUT LONG-TERM CURRENT USE OF INSULIN (H): Primary | ICD-10-CM

## 2022-02-02 DIAGNOSIS — Z85.028 HISTORY OF GASTRIC CANCER: ICD-10-CM

## 2022-02-02 PROCEDURE — 99214 OFFICE O/P EST MOD 30 MIN: CPT | Performed by: INTERNAL MEDICINE

## 2022-02-02 RX ORDER — GLIPIZIDE 5 MG/1
5 TABLET, FILM COATED, EXTENDED RELEASE ORAL DAILY
Qty: 90 TABLET | Refills: 3 | Status: SHIPPED | OUTPATIENT
Start: 2022-02-02 | End: 2023-01-04

## 2022-02-02 RX ORDER — LISINOPRIL 5 MG/1
5 TABLET ORAL DAILY
Qty: 90 TABLET | Refills: 3 | Status: SHIPPED | OUTPATIENT
Start: 2022-02-02 | End: 2023-01-04

## 2022-02-02 RX ORDER — OMEPRAZOLE 40 MG/1
CAPSULE, DELAYED RELEASE ORAL
Qty: 90 CAPSULE | Refills: 3 | Status: SHIPPED | OUTPATIENT
Start: 2022-02-02 | End: 2023-01-04

## 2022-02-02 RX ORDER — LOVASTATIN 40 MG
TABLET ORAL
Qty: 90 TABLET | Refills: 3 | Status: SHIPPED | OUTPATIENT
Start: 2022-02-02 | End: 2023-01-04

## 2022-02-02 NOTE — PROGRESS NOTES
Assessment & Plan       ICD-10-CM    1. Type 2 diabetes mellitus without complication, without long-term current use of insulin (H)  E11.9 blood glucose (NO BRAND SPECIFIED) lancets standard     blood glucose (NO BRAND SPECIFIED) test strip     glipiZIDE (GLUCOTROL XL) 5 MG 24 hr tablet     lisinopril (ZESTRIL) 5 MG tablet     metFORMIN (GLUCOPHAGE) 1000 MG tablet   2. Hypertension  I10 lisinopril (ZESTRIL) 5 MG tablet   3. Hyperlipidemia LDL goal <100  E78.5 lovastatin (MEVACOR) 40 MG tablet   4. Iliac artery aneurysm, right (H)  I72.3    5. History of gastric cancer  Z85.028      No change w/ medications today.    Return in 26 weeks (on 8/3/2022) for Diabetes follow-Up.    Mazin Ford MD  [facility] ROSA Doe is a 66 year old who presents for the following health issues     HPI     Diabetes Follow-up    How often are you checking your blood sugar? One time daily  What time of day are you checking your blood sugars (select all that apply)?  Before meals  Have you had any blood sugars above 200?  No  Have you had any blood sugars below 70?  No    What symptoms do you notice when your blood sugar is low?  None    What concerns do you have today about your diabetes? None     Do you have any of these symptoms? (Select all that apply)  No numbness or tingling in feet.  No redness, sores or blisters on feet.  No complaints of excessive thirst.  No reports of blurry vision.  No significant changes to weight.    Have you had a diabetic eye exam in the last 12 months? No        BP Readings from Last 2 Encounters:   02/02/22 118/74   12/03/21 115/75     Hemoglobin A1C POCT (%)   Date Value   02/05/2021 6.7 (H)   08/14/2020 7.3 (H)     Hemoglobin A1C (%)   Date Value   01/31/2022 7.1 (H)   08/04/2021 6.8 (H)     LDL Cholesterol Calculated (mg/dL)   Date Value   01/31/2022 88   02/05/2021 98   02/07/2020 93         How many servings of fruits and vegetables do you eat daily?  2-3    On  average, how many sweetened beverages do you drink each day (Examples: soda, juice, sweet tea, etc.  Do NOT count diet or artificially sweetened beverages)?   0    How many days per week do you exercise enough to make your heart beat faster? 3 or less    How many minutes a day do you exercise enough to make your heart beat faster? 30 - 60    How many days per week do you miss taking your medication? 0    T2DM.   A1C is slightly higher than previous checks, but is just over 7%. Doing well w/ meds.    HTN. No cardiac sx such as CP, palpitations, PND, orthopnea, VILLALOBOS or peripheral edema.    Iliac artery aneurysm. Managed by vascular. Last imaging was in December. No change in size.    Hyperlipidemia.   Lab Results   Component Value Date    LDL 88 01/31/2022    LDL 98 02/05/2021    LDL 93 02/07/2020     Hx of gastric cancer. Active surveillance by H/O. No active GI sx.         Objective    /74 (BP Location: Right arm, Patient Position: Sitting, Cuff Size: Adult Regular)   Pulse 64   Temp 97.5  F (36.4  C) (Temporal)   Resp 12   Wt 63.4 kg (139 lb 11.2 oz)   SpO2 98%   BMI 25.55 kg/m    Body mass index is 25.55 kg/m .  Physical Exam   GENERAL: healthy, alert and no distress  EYES: PERRL, EOMI  RESP: lungs clear to auscultation - no rales, rhonchi or wheezes  CV: regular rate and rhythm, normal S1 S2, no murmur, no peripheral edema and peripheral pulses strong  ABDOMEN: soft, nontender, bowel sounds normal  MS: no gross musculoskeletal defects noted, no edema  SKIN: no suspicious lesions or rashes  NEURO: Normal strength and tone  PSYCH: mentation appears normal, affect normal/bright

## 2022-02-20 ENCOUNTER — HEALTH MAINTENANCE LETTER (OUTPATIENT)
Age: 67
End: 2022-02-20

## 2022-05-09 ENCOUNTER — TRANSFERRED RECORDS (OUTPATIENT)
Dept: HEALTH INFORMATION MANAGEMENT | Facility: CLINIC | Age: 67
End: 2022-05-09
Payer: COMMERCIAL

## 2022-06-14 ENCOUNTER — OFFICE VISIT (OUTPATIENT)
Dept: OTHER | Facility: CLINIC | Age: 67
End: 2022-06-14
Attending: SURGERY
Payer: COMMERCIAL

## 2022-06-14 ENCOUNTER — HOSPITAL ENCOUNTER (OUTPATIENT)
Dept: ULTRASOUND IMAGING | Facility: CLINIC | Age: 67
Discharge: HOME | End: 2022-06-14
Attending: SURGERY
Payer: COMMERCIAL

## 2022-06-14 VITALS
HEART RATE: 74 BPM | HEIGHT: 62 IN | WEIGHT: 139.8 LBS | SYSTOLIC BLOOD PRESSURE: 128 MMHG | DIASTOLIC BLOOD PRESSURE: 75 MMHG | BODY MASS INDEX: 25.73 KG/M2

## 2022-06-14 DIAGNOSIS — I72.3 ILIAC ARTERY ANEURYSM, RIGHT (H): ICD-10-CM

## 2022-06-14 DIAGNOSIS — I72.3 ILIAC ARTERY ANEURYSM, RIGHT (H): Primary | ICD-10-CM

## 2022-06-14 PROCEDURE — 93978 VASCULAR STUDY: CPT

## 2022-06-14 PROCEDURE — 99214 OFFICE O/P EST MOD 30 MIN: CPT | Performed by: SURGERY

## 2022-06-14 PROCEDURE — G0463 HOSPITAL OUTPT CLINIC VISIT: HCPCS

## 2022-06-14 PROCEDURE — 93978 VASCULAR STUDY: CPT | Mod: 26 | Performed by: SURGERY

## 2022-06-14 PROCEDURE — G0463 HOSPITAL OUTPT CLINIC VISIT: HCPCS | Mod: 25

## 2022-06-14 NOTE — PROGRESS NOTES
Vascular Surgery Progress Note     Date: June 14, 2022     Reason for Visit:  Longitudinal follow-up of aneurysms    Subjective:  Mr. Alfaro declined a ; his wife provided some of the translation for him.   They report no recent changes in health or medications. He is overall doing well; he gets occasional joint aches and pains, including occasional soreness in the bilateral hips; this has not prevented him from walking or moving. He gets some shortness of breath with prolonged walking or while carrying heavy objects but has no other chest pain, SOB, abdominal pain.        Current Outpatient Medications:      blood glucose (NO BRAND SPECIFIED) lancets standard, Use to test blood sugar daily or as directed., Disp: 100 each, Rfl: 3     blood glucose (NO BRAND SPECIFIED) test strip, Use to test blood sugar daily or as directed., Disp: 100 strip, Rfl: 3     blood glucose monitoring (NO BRAND SPECIFIED) meter device kit, Use to test blood sugar daily or as directed., Disp: 1 kit, Rfl: 1     cetirizine (ZYRTEC) 10 MG tablet, Take 10 mg by mouth daily, Disp: , Rfl:      clotrimazole (LOTRIMIN) 1 % external cream, Apply topically 2 times daily, Disp: 60 g, Rfl: 1     cyclobenzaprine (FLEXERIL) 10 MG tablet, Take 1 tablet (10 mg) by mouth nightly as needed for muscle spasms, Disp: 30 tablet, Rfl: 0     glipiZIDE (GLUCOTROL XL) 5 MG 24 hr tablet, Take 1 tablet (5 mg) by mouth daily, Disp: 90 tablet, Rfl: 3     lisinopril (ZESTRIL) 5 MG tablet, Take 1 tablet (5 mg) by mouth daily, Disp: 90 tablet, Rfl: 3     lovastatin (MEVACOR) 40 MG tablet, TAKE ONE TABLET BY MOUTH AT BEDTIME, Disp: 90 tablet, Rfl: 3     metFORMIN (GLUCOPHAGE) 1000 MG tablet, TAKE ONE TABLET BY MOUTH TWICE A DAY WITH MEALS (BREAKFAST AND DINNER), Disp: 180 tablet, Rfl: 3     omeprazole (PRILOSEC) 40 MG DR capsule, TAKE ONE CAPSULE BY MOUTH EVERY DAY; TAKE 30 TO 60 MINUTES BEFORE A MEAL, Disp: 90 capsule, Rfl: 3     triamcinolone (KENALOG) 0.1 %  "external cream, Apply topically 2 times daily, Disp: 60 g, Rfl: 1     Physical Exam       BP: 128/75 Pulse: 74            Vital Signs with Ranges  Pulse:  [74] 74  BP: (128)/(75) 128/75  139 lbs 12.8 oz    Constitutional: cooperative, no apparent distress, sitting comfortably in chair. Accompanied by his wife.  Vascular: Bilateral DP and PT pulses palpable  Musculoskeletal: grossly normal and symmetric ROM and strength in BL extremities   Neurologic: Awake, alert, oriented to name, place, time, and situation    Imaging:  I have reviewed the following imaging studies:  US Aortoiliac (6/14/22): \"Right common iliac artery aneurysm, measuring 2.71 x 2.93 cm in maximal diameter, without significant interval change from prior studies. 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 No significant changes from prior studies. Well-preserved arterial flow throughout aorta and iliac arteries.\"    US Aortoiliac (12/3/21 - prior study): \"1. Ectasia of the infrarenal abdominal aorta, measuring 2.97 cm in greatest dimension.  2. Aneurysm of the right common iliac artery, measuring 2.97 x 2.80 cm in maximal diameter. This is unchanged compared to prior CT.  3. Ectasia of the left common and external iliac arteries.\"     Note: CT imaging done for oncology purposes in 05/9/2022; images are not available but report notes that the right ROSINA aneurysm measures 2.8 cm.       Assessment & Plan   Mr. Alfaro is a 67 year old male with history of HTN, DL, remote history of gastric adenocarcinoma (stage II, T2 N1 M0, s/p Bilroth 1 partial gastrectomy in 2012) and well-controlled DM, who presents for follow-up evaluation of a right common iliac artery aneurysm. His right ROSINA is approximately 3.0 cm in greatest diameter, with aneurysmal enlargement extending to both the aortic bifurcation and the iliac bifurcation.       Images are not available from earlier studies, but " a CT in 2018 was read as 2.5 cm and from 2019 onward the right ROSINA aneurysm has been read as 2.9 - 3.0 cm, with stability again demonstrated on today's study.    Reviewed the imaging findings with Mr. Alfaro and his wife    Threshold for repair when iliac aneurysm > 3.5 cm    Will extend his follow-up to an annual basis, with repeat imaging in 1 year with aortoiliac US    Marlys Luna MD    Total time spent on the date of this encounter doing: chart review, review of test results, patient visit, physical exam, education, counseling, developing plan of care, and documenting = 30 minutes

## 2022-06-14 NOTE — PROGRESS NOTES
"[facility]        Patient is here for a  follow up.     Pt is currently taking Statin.    /75 (BP Location: Right arm, Patient Position: Chair, Cuff Size: Adult Regular)   Pulse 74   Ht 5' 2\" (1.575 m)   Wt 139 lb 12.8 oz (63.4 kg)   BMI 25.57 kg/m      The provider has been notified that the patient has no concerns.     Questions patient would like addressed today are: N/A.    Refills are needed: N/A    Has homecare services and agency name:  Donna Beasley MA    "

## 2022-08-03 ENCOUNTER — OFFICE VISIT (OUTPATIENT)
Dept: PEDIATRICS | Facility: CLINIC | Age: 67
End: 2022-08-03
Payer: COMMERCIAL

## 2022-08-03 VITALS
HEART RATE: 74 BPM | SYSTOLIC BLOOD PRESSURE: 128 MMHG | OXYGEN SATURATION: 96 % | DIASTOLIC BLOOD PRESSURE: 78 MMHG | RESPIRATION RATE: 14 BRPM | BODY MASS INDEX: 25.03 KG/M2 | WEIGHT: 136 LBS | TEMPERATURE: 98.4 F | HEIGHT: 62 IN

## 2022-08-03 DIAGNOSIS — Z12.11 SCREEN FOR COLON CANCER: ICD-10-CM

## 2022-08-03 DIAGNOSIS — E78.5 HYPERLIPIDEMIA LDL GOAL <100: ICD-10-CM

## 2022-08-03 DIAGNOSIS — I10 ESSENTIAL HYPERTENSION: ICD-10-CM

## 2022-08-03 DIAGNOSIS — E11.9 TYPE 2 DIABETES MELLITUS WITHOUT COMPLICATION, WITHOUT LONG-TERM CURRENT USE OF INSULIN (H): Primary | ICD-10-CM

## 2022-08-03 LAB — HBA1C MFR BLD: 7.6 % (ref 0–5.6)

## 2022-08-03 PROCEDURE — 99214 OFFICE O/P EST MOD 30 MIN: CPT | Mod: 25 | Performed by: INTERNAL MEDICINE

## 2022-08-03 PROCEDURE — 83036 HEMOGLOBIN GLYCOSYLATED A1C: CPT | Performed by: INTERNAL MEDICINE

## 2022-08-03 PROCEDURE — G0009 ADMIN PNEUMOCOCCAL VACCINE: HCPCS | Performed by: INTERNAL MEDICINE

## 2022-08-03 PROCEDURE — 90677 PCV20 VACCINE IM: CPT | Performed by: INTERNAL MEDICINE

## 2022-08-03 PROCEDURE — 36415 COLL VENOUS BLD VENIPUNCTURE: CPT | Performed by: INTERNAL MEDICINE

## 2022-08-03 ASSESSMENT — PAIN SCALES - GENERAL: PAINLEVEL: NO PAIN (0)

## 2022-08-03 NOTE — PROGRESS NOTES
Assessment & Plan       ICD-10-CM    1. Type 2 diabetes mellitus without complication, without long-term current use of insulin (H)  E11.9 HEMOGLOBIN A1C   2. Hyperlipidemia LDL goal <100  E78.5    3. Hypertension  I10    4. Screen for colon cancer  Z12.11 Colonscopy Screening  Referral     T2DM - overall well controlled. CPM.  Continue w/ meds for lipids and HTN.    Return in about 6 months (around 2/3/2023) for Routine Visit.    Mazin Ford MD  [facility] ALMA ROSA Doe is a 67 year old, presenting for the following health issues:  Follow Up      HPI     Diabetes Follow-up    How often are you checking your blood sugar? One time daily  What time of day are you checking your blood sugars (select all that apply)?  Before meals  Have you had any blood sugars above 200?  No  Have you had any blood sugars below 70?  No    What symptoms do you notice when your blood sugar is low?  None    What concerns do you have today about your diabetes? None     Do you have any of these symptoms? (Select all that apply)  No numbness or tingling in feet.  No redness, sores or blisters on feet.  No complaints of excessive thirst.  No reports of blurry vision.  No significant changes to weight.    Have you had a diabetic eye exam in the last 12 months? No    Lab Results   Component Value Date    A1C 7.6 08/03/2022    A1C 7.1 01/31/2022    A1C 6.7 02/05/2021    A1C 7.3 08/14/2020     Hyperlipidemia Follow-Up      Are you regularly taking any medication or supplement to lower your cholesterol?   No    Are you having muscle aches or other side effects that you think could be caused by your cholesterol lowering medication?  No    Lab Results   Component Value Date    LDL 88 01/31/2022    LDL 98 02/05/2021    LDL 93 02/07/2020       Hypertension Follow-up      Do you check your blood pressure regularly outside of the clinic? Yes     Are you following a low salt diet? No    Are your blood pressures  "ever more than 140 on the top number (systolic) OR more   than 90 on the bottom number (diastolic), for example 140/90? No    BP Readings from Last 2 Encounters:   08/03/22 128/78   06/14/22 128/75     Hemoglobin A1C (%)   Date Value   08/03/2022 7.6 (H)   01/31/2022 7.1 (H)   02/05/2021 6.7 (H)   08/14/2020 7.3 (H)     LDL Cholesterol Calculated (mg/dL)   Date Value   01/31/2022 88   02/05/2021 98   02/07/2020 93         How many servings of fruits and vegetables do you eat daily?  2-3    On average, how many sweetened beverages do you drink each day (Examples: soda, juice, sweet tea, etc.  Do NOT count diet or artificially sweetened beverages)?   0    How many days per week do you exercise enough to make your heart beat faster? 3 or less    How many minutes a day do you exercise enough to make your heart beat faster? 9 or less    How many days per week do you miss taking your medication? 0    Due for colon cancer screening. Order signed.        Objective    /78   Pulse 74   Temp 98.4  F (36.9  C)   Resp 14   Ht 1.575 m (5' 2\")   Wt 61.7 kg (136 lb)   SpO2 96%   BMI 24.87 kg/m    Body mass index is 24.87 kg/m .  Physical Exam   GENERAL: healthy, alert and no distress  EYES: PERRL, EOMI  HENT: ear canals and TM's normal  NECK: no adenopathy  RESP: lungs clear to auscultation - no rales, rhonchi or wheezes  CV: regular rate and rhythm, normal S1 S2, no murmur, no peripheral edema and peripheral pulses strong  ABDOMEN: soft, nontender, bowel sounds normal  MS: no gross musculoskeletal defects noted, no edema  SKIN: no suspicious lesions or rashes  NEURO: Normal strength and tone  PSYCH: mentation appears normal, affect normal/bright  Diabetic foot exam: normal DP and PT pulses, no trophic changes or ulcerative lesions and normal monofilament exam     Results for orders placed or performed in visit on 08/03/22   HEMOGLOBIN A1C     Status: Abnormal   Result Value Ref Range    Hemoglobin A1C 7.6 (H) 0.0 - " 5.6 %                .  ..

## 2022-08-07 ENCOUNTER — HEALTH MAINTENANCE LETTER (OUTPATIENT)
Age: 67
End: 2022-08-07

## 2022-10-23 ENCOUNTER — HEALTH MAINTENANCE LETTER (OUTPATIENT)
Age: 67
End: 2022-10-23

## 2023-01-03 DIAGNOSIS — E11.9 TYPE 2 DIABETES MELLITUS WITHOUT COMPLICATION, WITHOUT LONG-TERM CURRENT USE OF INSULIN (H): ICD-10-CM

## 2023-01-03 DIAGNOSIS — I10 ESSENTIAL HYPERTENSION: ICD-10-CM

## 2023-01-03 DIAGNOSIS — Z85.028 HISTORY OF GASTRIC CANCER: Primary | ICD-10-CM

## 2023-01-03 DIAGNOSIS — E78.5 HYPERLIPIDEMIA LDL GOAL <100: ICD-10-CM

## 2023-01-04 RX ORDER — GLIPIZIDE 5 MG/1
TABLET, FILM COATED, EXTENDED RELEASE ORAL
Qty: 90 TABLET | Refills: 0 | Status: SHIPPED | OUTPATIENT
Start: 2023-01-04 | End: 2023-02-03

## 2023-01-04 RX ORDER — LOVASTATIN 40 MG
TABLET ORAL
Qty: 90 TABLET | Refills: 0 | Status: SHIPPED | OUTPATIENT
Start: 2023-01-04 | End: 2023-02-03

## 2023-01-04 RX ORDER — LISINOPRIL 5 MG/1
TABLET ORAL
Qty: 90 TABLET | Refills: 0 | Status: SHIPPED | OUTPATIENT
Start: 2023-01-04 | End: 2023-02-03

## 2023-01-04 RX ORDER — OMEPRAZOLE 40 MG/1
CAPSULE, DELAYED RELEASE ORAL
Qty: 90 CAPSULE | Refills: 0 | Status: SHIPPED | OUTPATIENT
Start: 2023-01-04 | End: 2023-02-03

## 2023-01-04 NOTE — TELEPHONE ENCOUNTER
Medication is being filled for 1 time refill only due to:  Patient needs to be seen because due for an appt, has appt scheduled.  .  Should still have some meds until the beginning of February 2023, will be short a few days so sending in refill.  Pt also due for lab work.  This is for lovastatin, glipizide, metformin, lisinopril and omeprazole.      Appointments in Next Year    Feb 01, 2023  9:15 AM  LAB with [facility]  [facility] ([facility] ) [phone]   Feb 03, 2023  9:30 AM  Provider Visit with Mazin Ford MD  [facility] ([facility] ) [phone]             Strong peripheral pulses

## 2023-01-31 ENCOUNTER — DOCUMENTATION ONLY (OUTPATIENT)
Dept: LAB | Facility: CLINIC | Age: 68
End: 2023-01-31
Payer: COMMERCIAL

## 2023-01-31 NOTE — PROGRESS NOTES
Hi-    Patient has a lab appointment on 02/01/2023 but has no lab orders. Please place necessary future lab orders. Thank you! JENNIFER Green

## 2023-02-01 ENCOUNTER — LAB (OUTPATIENT)
Dept: LAB | Facility: CLINIC | Age: 68
End: 2023-02-01
Payer: COMMERCIAL

## 2023-02-01 DIAGNOSIS — Z53.9 ERRONEOUS ENCOUNTER--DISREGARD: Primary | ICD-10-CM

## 2023-02-03 ENCOUNTER — OFFICE VISIT (OUTPATIENT)
Dept: PEDIATRICS | Facility: CLINIC | Age: 68
End: 2023-02-03
Payer: COMMERCIAL

## 2023-02-03 VITALS
DIASTOLIC BLOOD PRESSURE: 60 MMHG | OXYGEN SATURATION: 99 % | RESPIRATION RATE: 20 BRPM | BODY MASS INDEX: 25.36 KG/M2 | TEMPERATURE: 97.6 F | WEIGHT: 137.8 LBS | HEIGHT: 62 IN | SYSTOLIC BLOOD PRESSURE: 92 MMHG | HEART RATE: 62 BPM

## 2023-02-03 DIAGNOSIS — I10 ESSENTIAL HYPERTENSION: ICD-10-CM

## 2023-02-03 DIAGNOSIS — I72.3 ILIAC ARTERY ANEURYSM, RIGHT (H): ICD-10-CM

## 2023-02-03 DIAGNOSIS — E11.9 TYPE 2 DIABETES MELLITUS WITHOUT COMPLICATION, WITHOUT LONG-TERM CURRENT USE OF INSULIN (H): ICD-10-CM

## 2023-02-03 DIAGNOSIS — Z00.00 ROUTINE GENERAL MEDICAL EXAMINATION AT A HEALTH CARE FACILITY: Primary | ICD-10-CM

## 2023-02-03 DIAGNOSIS — Z85.028 HISTORY OF GASTRIC CANCER: ICD-10-CM

## 2023-02-03 DIAGNOSIS — E78.5 HYPERLIPIDEMIA LDL GOAL <100: ICD-10-CM

## 2023-02-03 LAB
ALBUMIN SERPL BCG-MCNC: 4.5 G/DL (ref 3.5–5.2)
ALP SERPL-CCNC: 69 U/L (ref 40–129)
ALT SERPL W P-5'-P-CCNC: 17 U/L (ref 10–50)
ANION GAP SERPL CALCULATED.3IONS-SCNC: 11 MMOL/L (ref 7–15)
AST SERPL W P-5'-P-CCNC: 28 U/L (ref 10–50)
BILIRUB SERPL-MCNC: 0.7 MG/DL
BUN SERPL-MCNC: 18.1 MG/DL (ref 8–23)
CALCIUM SERPL-MCNC: 9.6 MG/DL (ref 8.8–10.2)
CHLORIDE SERPL-SCNC: 99 MMOL/L (ref 98–107)
CHOLEST SERPL-MCNC: 189 MG/DL
CREAT SERPL-MCNC: 1.15 MG/DL (ref 0.67–1.17)
CREAT UR-MCNC: 61.3 MG/DL
DEPRECATED HCO3 PLAS-SCNC: 24 MMOL/L (ref 22–29)
GFR SERPL CREATININE-BSD FRML MDRD: 71 ML/MIN/1.73M2
GLUCOSE SERPL-MCNC: 152 MG/DL (ref 70–99)
HBA1C MFR BLD: 7.9 % (ref 0–5.6)
HDLC SERPL-MCNC: 55 MG/DL
LDLC SERPL CALC-MCNC: 111 MG/DL
MICROALBUMIN UR-MCNC: <12 MG/L
MICROALBUMIN/CREAT UR: NORMAL MG/G{CREAT}
NONHDLC SERPL-MCNC: 134 MG/DL
POTASSIUM SERPL-SCNC: 4.6 MMOL/L (ref 3.4–5.3)
PROT SERPL-MCNC: 7.5 G/DL (ref 6.4–8.3)
SODIUM SERPL-SCNC: 134 MMOL/L (ref 136–145)
T4 FREE SERPL-MCNC: 1.23 NG/DL (ref 0.9–1.7)
TRIGL SERPL-MCNC: 117 MG/DL
TSH SERPL DL<=0.005 MIU/L-ACNC: 4.58 UIU/ML (ref 0.3–4.2)

## 2023-02-03 PROCEDURE — 82043 UR ALBUMIN QUANTITATIVE: CPT | Performed by: INTERNAL MEDICINE

## 2023-02-03 PROCEDURE — 84439 ASSAY OF FREE THYROXINE: CPT | Performed by: INTERNAL MEDICINE

## 2023-02-03 PROCEDURE — 83036 HEMOGLOBIN GLYCOSYLATED A1C: CPT | Performed by: INTERNAL MEDICINE

## 2023-02-03 PROCEDURE — 36415 COLL VENOUS BLD VENIPUNCTURE: CPT | Performed by: INTERNAL MEDICINE

## 2023-02-03 PROCEDURE — 80061 LIPID PANEL: CPT | Performed by: INTERNAL MEDICINE

## 2023-02-03 PROCEDURE — G0438 PPPS, INITIAL VISIT: HCPCS | Performed by: INTERNAL MEDICINE

## 2023-02-03 PROCEDURE — 99214 OFFICE O/P EST MOD 30 MIN: CPT | Mod: 25 | Performed by: INTERNAL MEDICINE

## 2023-02-03 PROCEDURE — 82570 ASSAY OF URINE CREATININE: CPT | Performed by: INTERNAL MEDICINE

## 2023-02-03 PROCEDURE — 84443 ASSAY THYROID STIM HORMONE: CPT | Performed by: INTERNAL MEDICINE

## 2023-02-03 PROCEDURE — 80053 COMPREHEN METABOLIC PANEL: CPT | Performed by: INTERNAL MEDICINE

## 2023-02-03 RX ORDER — OMEPRAZOLE 40 MG/1
40 CAPSULE, DELAYED RELEASE ORAL DAILY
Qty: 90 CAPSULE | Refills: 3 | Status: SHIPPED | OUTPATIENT
Start: 2023-02-03 | End: 2024-02-05

## 2023-02-03 RX ORDER — LOVASTATIN 40 MG
40 TABLET ORAL AT BEDTIME
Qty: 90 TABLET | Refills: 4 | Status: SHIPPED | OUTPATIENT
Start: 2023-02-03 | End: 2024-02-05

## 2023-02-03 RX ORDER — GLIPIZIDE 5 MG/1
5 TABLET, FILM COATED, EXTENDED RELEASE ORAL DAILY
Qty: 90 TABLET | Refills: 3 | Status: SHIPPED | OUTPATIENT
Start: 2023-02-03 | End: 2024-02-05

## 2023-02-03 RX ORDER — LISINOPRIL 5 MG/1
5 TABLET ORAL DAILY
Qty: 90 TABLET | Refills: 4 | Status: SHIPPED | OUTPATIENT
Start: 2023-02-03 | End: 2024-02-05

## 2023-02-03 ASSESSMENT — PAIN SCALES - GENERAL: PAINLEVEL: NO PAIN (0)

## 2023-02-03 NOTE — PROGRESS NOTES
"  Assessment & Plan       ICD-10-CM    1. Routine general medical examination at a health care facility  Z00.00       2. Type 2 diabetes mellitus without complication, without long-term current use of insulin (H)  E11.9 Lipid panel reflex to direct LDL Non-fasting     Albumin Random Urine Quantitative with Creat Ratio     HEMOGLOBIN A1C     glipiZIDE (GLUCOTROL XL) 5 MG 24 hr tablet     lisinopril (ZESTRIL) 5 MG tablet     metFORMIN (GLUCOPHAGE) 1000 MG tablet     blood glucose (NO BRAND SPECIFIED) test strip     blood glucose (NO BRAND SPECIFIED) lancets standard     TSH with free T4 reflex     OFFICE/OUTPT VISIT,EST,LEVL IV      3. Essential hypertension  I10 COMPREHENSIVE METABOLIC PANEL     OFFICE/OUTPT VISIT,EST,LEVL IV      4. Hyperlipidemia LDL goal <100  E78.5 lovastatin (MEVACOR) 40 MG tablet     OFFICE/OUTPT VISIT,EST,LEVL IV      5. Hypertension  I10 lisinopril (ZESTRIL) 5 MG tablet      6. History of gastric cancer  Z85.028 omeprazole (PRILOSEC) 40 MG DR capsule      7. Iliac artery aneurysm, right (H)  I72.3         Overall he is feeling well.   No changes with medications today.  Fasting labwork ordered.  Due for vascular f/u this summer.        BMI:   Estimated body mass index is 25.46 kg/m  as calculated from the following:    Height as of this encounter: 1.567 m (5' 1.69\").    Weight as of this encounter: 62.5 kg (137 lb 12.8 oz).         Appropriate preventive services were discussed with this patient, including applicable screening as appropriate for cardiovascular disease, diabetes, osteopenia/osteoporosis, and glaucoma.  As appropriate for age/gender, discussed screening for colorectal cancer, prostate cancer. Checklist reviewing preventive services available has been given to the patient.    Reviewed patients plan of care and provided an AVS. The Basic Care Plan (routine screening as documented in Health Maintenance) for Nader meets the Care Plan requirement. This Care Plan has been established " "and reviewed with the Patient.    Return in about 6 months (around 8/3/2023) for Diabetes follow-Up.    Mazin Ford MD  [facility] ALMA ROSA Doe is a 67 year old, presenting for the following health issues:  Diabetes and Wellness Visit      HPI             How many servings of fruits and vegetables do you eat daily?  0-1    On average, how many sweetened beverages do you drink each day (Examples: soda, juice, sweet tea, etc.  Do NOT count diet or artificially sweetened beverages)?   0    How many days per week do you exercise enough to make your heart beat faster? 5    How many minutes a day do you exercise enough to make your heart beat faster? 30 - 60    How many days per week do you miss taking your medication? 0      Annual Wellness Visit    Patient has been advised of split billing requirements and indicates understanding: Yes     Are you in the first 12 months of your Medicare Part B coverage?  No    Physical Health:    In general, how would you rate your overall physical health? fair    Outside of work, how many days during the week do you exercise?4-5 days/week    Outside of work, approximately how many minutes a day do you exercise?45-60 minutes    If you drink alcohol do you typically have >3 drinks per day or >7 drinks per week? No    Do you usually eat at least 4 servings of fruit and vegetables a day, include whole grains & fiber and avoid regularly eating high fat or \"junk\" foods? No    Do you have any problems taking medications regularly? No    Do you have any side effects from medications? none    Needs assistance for the following daily activities: no assistance needed    Which of the following safety concerns are present in your home?  none identified     Hearing impairment: No    In the past 6 months, have you been bothered by leaking of urine? no    Mental Health:    In general, how would you rate your overall mental or emotional health? good  PHQ-2 Score: 0    Do " you feel safe in your environment? Yes    Have you ever done Advance Care Planning? (For example, a Health Directive, POLST, or a discussion with a medical provider or your loved ones about your wishes)? No, advance care planning information given to patient to review.  Patient declined advance care planning discussion at this time.    Fall risk:   Fallen 2 or more times in the past year?: No  Any fall with injury in the past year?: No    Cognitive Screenin) Repeat 3 items (Leader, Season, Table)    2) Clock draw: NORMAL  3) 3 item recall: Recalls NO objects   Results: NORMAL clock, 1-2 items recalled: COGNITIVE IMPAIRMENT LESS LIKELY *LANGUAGE BARRIER*     Mini-CogTM Copyright S Raisa. Licensed by the author for use in [facility]; reprinted with permission (alondra@[facility]). All rights reserved.      Do you have sleep apnea, excessive snoring or daytime drowsiness?: no    Current providers sharing in care for this patient include:   Patient Care Team:  Maizn Ford MD as PCP - General  Mazin Ford MD as Assigned PCP  Marlys Luna MD as Assigned Heart and Vascular Provider  Yeo, Albert, MD as Assigned Musculoskeletal Provider      Diabetes Follow-up    How often are you checking your blood sugar? A few times a week  What time of day are you checking your blood sugars (select all that apply)?  Before and after meals  Have you had any blood sugars above 200?  No  Have you had any blood sugars below 70?  No    What symptoms do you notice when your blood sugar is low?  None    What concerns do you have today about your diabetes? None     Do you have any of these symptoms? (Select all that apply)  No numbness or tingling in feet.  No redness, sores or blisters on feet.  No complaints of excessive thirst.  No reports of blurry vision.  No significant changes to weight.    Have you had a diabetic eye exam in the last 12 months? No        BP Readings from Last 2 Encounters:   23  "92/60   08/03/22 128/78     Hemoglobin A1C (%)   Date Value   08/03/2022 7.6 (H)   01/31/2022 7.1 (H)   02/05/2021 6.7 (H)   08/14/2020 7.3 (H)     Hyperlipidemia. Treated with statin.    LDL Cholesterol Calculated (mg/dL)   Date Value   01/31/2022 88   02/05/2021 98   02/07/2020 93     Iliac artery aneurysm.  Has been stable. Followed by vascular.    HTN. No cardiac sx such as CP, palpitations, PND, orthopnea, VILLALOBOS or peripheral edema.     Hx of gastric cancer. Taking omeprazole daily. Has an upcoming EGD and colonoscopy scheduled.        Objective    BP 92/60 (BP Location: Right arm, Patient Position: Sitting, Cuff Size: Adult Regular)   Pulse 62   Temp 97.6  F (36.4  C) (Temporal)   Resp 20   Ht 1.567 m (5' 1.69\")   Wt 62.5 kg (137 lb 12.8 oz)   SpO2 99%   BMI 25.46 kg/m    Body mass index is 25.46 kg/m .  Physical Exam   GENERAL: healthy, alert and no distress  EYES: PERRL, EOMI  HENT: ear canals and TM's normal  NECK: no adenopathy  RESP: lungs clear to auscultation - no rales, rhonchi or wheezes  CV: regular rate and rhythm, normal S1 S2, no murmur, no peripheral edema and peripheral pulses strong  ABDOMEN: soft, nontender, bowel sounds normal  MS: no gross musculoskeletal defects noted  SKIN: no suspicious lesions or rashes  NEURO: Normal strength and tone  PSYCH: mentation appears normal, affect normal/bright  Diabetic foot exam: normal DP and PT pulses, no trophic changes or ulcerative lesions and normal monofilament exam          The patient was provided with suggestions to help him develop a healthy physical lifestyle.  The patient was counseled and encouraged to consider modifying their diet and eating habits. He was provided with information on recommended healthy diet options.  "

## 2023-02-03 NOTE — PATIENT INSTRUCTIONS
Patient Education   Personalized Prevention Plan  You are due for the preventive services outlined below.  Your care team is available to assist you in scheduling these services.  If you have already completed any of these items, please share that information with your care team to update in your medical record.  Health Maintenance Due   Topic Date Due     ANNUAL REVIEW OF HM ORDERS  Never done     Eye Exam  Never done     Zoster (Shingles) Vaccine (2 of 2) 04/03/2020     Colorectal Cancer Screening  07/05/2022     Basic Metabolic Panel  01/31/2023     Comprehensive Metabolic Panel  01/31/2023     Cholesterol Lab  01/31/2023     Kidney Microalbumin Urine Test  01/31/2023     Your Health Risk Assessment indicates you feel you are not in good health    A healthy lifestyle helps keep the body fit and the mind alert. It helps protect you from disease, helps you fight disease, and helps prevent chronic disease (disease that doesn't go away) from getting worse. This is important as you get older and begin to notice twinges in muscles and joints and a decline in the strength and stamina you once took for granted. A healthy lifestyle includes good healthcare, good nutrition, weight control, recreation, and regular exercise. Avoid harmful substances and do what you can to keep safe. Another part of a healthy lifestyle is stay mentally active and socially involved.    Good healthcare     Have a wellness visit every year.     If you have new symptoms, let us know right away. Don't wait until the next checkup.     Take medicines exactly as prescribed and keep your medicines in a safe place. Tell us if your medicine causes problems.   Healthy diet and weight control     Eat 3 or 4 small, nutritious, low-fat, high-fiber meals a day. Include a variety of fruits, vegetables, and whole-grain foods.     Make sure you get enough calcium in your diet. Calcium, vitamin D, and exercise help prevent osteoporosis (bone thinning).     If  you live alone, try eating with others when you can. That way you get a good meal and have company while you eat it.     Try to keep a healthy weight. If you eat more calories than your body uses for energy, it will be stored as fat and you will gain weight.     Recreation   Recreation is not limited to sports and team events. It includes any activity that provides relaxation, interest, enjoyment, and exercise. Recreation provides an outlet for physical, mental, and social energy. It can give a sense of worth and achievement. It can help you stay healthy.    Mental Exercise and Social Involvement  Mental and emotional health is as important as physical health. Keep in touch with friends and family. Stay as active as possible. Continue to learn and challenge yourself.   Things you can do to stay mentally active are:    Learn something new, like a foreign language or musical instrument.     Play SCRABBLE or do crossword puzzles. If you cannot find people to play these games with you at home, you can play them with others on your computer through the Internet.     Join a games club--anything from card games to chess or checkers or lawn bowling.     Start a new hobby.     Go back to school.     Volunteer.     Read.   Keep up with world events.    Understanding USDA MyPlate  The USDA has guidelines to help you make healthy food choices. These are called MyPlate. MyPlate shows the food groups that make up healthy meals using the image of a place setting. Before you eat, think about the healthiest choices for what to put on your plate or in your cup or bowl. To learn more about building a healthy plate, visit www.choosemyplate.gov.    The food groups    Fruits. Any fruit or 100% fruit juice counts as part of the Fruit Group. Fruits may be fresh, canned, frozen, or dried, and may be whole, cut-up, or pureed. Make 1/2 of your plate fruits and vegetables.    Vegetables. Any vegetable or 100% vegetable juice counts as a member  of the Vegetable Group. Vegetables may be fresh, frozen, canned, or dried. They can be served raw or cooked and may be whole, cut-up, or mashed. Make 1/2 of your plate fruits and vegetables.    Grains. All foods made from grains are part of the Grains Group. These include wheat, rice, oats, cornmeal, and barley. Grains are often used to make foods such as bread, pasta, oatmeal, cereal, tortillas, and grits. Grains should be no more than 1/4 of your plate. At least half of your grains should be whole grains.    Protein. This group includes meat, poultry, seafood, beans and peas, eggs, processed soy products (such as tofu), nuts (including nut butters), and seeds. Make protein choices no more than 1/4 of your plate. Meat and poultry choices should be lean or low fat.    Dairy. The Dairy Group includes all fluid milk products and foods made from milk that contain calcium, such as yogurt and cheese. (Foods that have little calcium, such as cream, butter, and cream cheese, are not part of this group.) Most dairy choices should be low-fat or fat-free.    Oils. Oils aren't a food group, but they do contain essential nutrients. However it's important to watch your intake of oils. These are fats that are liquid at room temperature. They include canola, corn, olive, soybean, vegetable, and sunflower oil. Foods that are mainly oil include mayonnaise, certain salad dressings, and soft margarines. You likely already get your daily oil allowance from the foods you eat.  Things to limit  Eating healthy also means limiting these things in your diet:       Salt (sodium). Many processed foods have a lot of sodium. To keep sodium intake down, eat fresh vegetables, meats, poultry, and seafood when possible. Purchase low-sodium, reduced-sodium, or no-salt-added food products at the store. And don't add salt to your meals at home. Instead, season them with herbs and spices such as dill, oregano, cumin, and paprika. Or try adding flavor  with lemon or lime zest and juice.    Saturated fat. Saturated fats are most often found in animal products such as beef, pork, and chicken. They are often solid at room temperature, such as butter. To reduce your saturated fat intake, choose leaner cuts of meat and poultry. And try healthier cooking methods such as grilling, broiling, roasting, or baking. For a simple lower-fat swap, use plain nonfat yogurt instead of mayonnaise when making potato salad or macaroni salad.    Added sugars. These are sugars added to foods. They are in foods such as ice cream, candy, soda, fruit drinks, sports drinks, energy drinks, cookies, pastries, jams, and syrups. Cut down on added sugars by sharing sweet treats with a family member or friend. You can also choose fruit for dessert, and drink water or other unsweetened beverages.     [employer] last reviewed this educational content on 6/1/2020 2000-2021 [employer]. All rights reserved. This information is not intended as a substitute for professional medical care. Always follow your healthcare professional's instructions.

## 2023-03-24 ENCOUNTER — TRANSFERRED RECORDS (OUTPATIENT)
Dept: HEALTH INFORMATION MANAGEMENT | Facility: CLINIC | Age: 68
End: 2023-03-24
Payer: COMMERCIAL

## 2023-05-16 ENCOUNTER — TRANSFERRED RECORDS (OUTPATIENT)
Dept: HEALTH INFORMATION MANAGEMENT | Facility: CLINIC | Age: 68
End: 2023-05-16
Payer: COMMERCIAL

## 2023-05-23 ENCOUNTER — TRANSFERRED RECORDS (OUTPATIENT)
Dept: HEALTH INFORMATION MANAGEMENT | Facility: CLINIC | Age: 68
End: 2023-05-23
Payer: COMMERCIAL

## 2023-06-14 ENCOUNTER — OFFICE VISIT (OUTPATIENT)
Dept: OTHER | Facility: CLINIC | Age: 68
End: 2023-06-14
Attending: SURGERY
Payer: COMMERCIAL

## 2023-06-14 ENCOUNTER — HOSPITAL ENCOUNTER (OUTPATIENT)
Dept: ULTRASOUND IMAGING | Facility: CLINIC | Age: 68
Discharge: HOME | End: 2023-06-14
Attending: SURGERY
Payer: COMMERCIAL

## 2023-06-14 VITALS
WEIGHT: 138.4 LBS | HEART RATE: 56 BPM | SYSTOLIC BLOOD PRESSURE: 130 MMHG | DIASTOLIC BLOOD PRESSURE: 77 MMHG | BODY MASS INDEX: 25.57 KG/M2

## 2023-06-14 DIAGNOSIS — I72.4 ANEURYSM OF ARTERY OF LOWER EXTREMITY (H): ICD-10-CM

## 2023-06-14 DIAGNOSIS — I72.3 ILIAC ARTERY ANEURYSM, RIGHT (H): ICD-10-CM

## 2023-06-14 DIAGNOSIS — I72.3 ILIAC ARTERY ANEURYSM, RIGHT (H): Primary | ICD-10-CM

## 2023-06-14 PROCEDURE — 99214 OFFICE O/P EST MOD 30 MIN: CPT | Performed by: SURGERY

## 2023-06-14 PROCEDURE — 93978 VASCULAR STUDY: CPT | Mod: 26 | Performed by: SURGERY

## 2023-06-14 PROCEDURE — G0463 HOSPITAL OUTPT CLINIC VISIT: HCPCS | Mod: 25

## 2023-06-14 PROCEDURE — 93978 VASCULAR STUDY: CPT

## 2023-06-14 NOTE — PROGRESS NOTES
Vascular Surgery Progress Note     Date: June 14, 2023     Reason for Visit:  Longitudinal follow up of iliac artery aneurysms    Subjective:  Mr. Doe reports that he has had no major medical changes or issues in the last year. Overall he feels well and has remained active.       Current Outpatient Medications:      blood glucose (NO BRAND SPECIFIED) lancets standard, Use to test blood sugar daily or as directed., Disp: 100 each, Rfl: 3     blood glucose (NO BRAND SPECIFIED) test strip, Use to test blood sugar daily or as directed., Disp: 100 strip, Rfl: 3     blood glucose monitoring (NO BRAND SPECIFIED) meter device kit, Use to test blood sugar daily or as directed., Disp: 1 kit, Rfl: 1     cetirizine (ZYRTEC) 10 MG tablet, Take 10 mg by mouth daily, Disp: , Rfl:      glipiZIDE (GLUCOTROL XL) 5 MG 24 hr tablet, Take 1 tablet (5 mg) by mouth daily, Disp: 90 tablet, Rfl: 3     lisinopril (ZESTRIL) 5 MG tablet, Take 1 tablet (5 mg) by mouth daily, Disp: 90 tablet, Rfl: 4     lovastatin (MEVACOR) 40 MG tablet, Take 1 tablet (40 mg) by mouth At Bedtime, Disp: 90 tablet, Rfl: 4     metFORMIN (GLUCOPHAGE) 1000 MG tablet, Take 0.5 tablets (500 mg) by mouth 2 times daily (with meals), Disp: 180 tablet, Rfl: 3     omeprazole (PRILOSEC) 40 MG DR capsule, Take 1 capsule (40 mg) by mouth daily, Disp: 90 capsule, Rfl: 3     triamcinolone (KENALOG) 0.1 % external cream, Apply topically 2 times daily, Disp: 60 g, Rfl: 1     clotrimazole (LOTRIMIN) 1 % external cream, Apply topically 2 times daily (Patient not taking: Reported on 2/3/2023), Disp: 60 g, Rfl: 1     cyclobenzaprine (FLEXERIL) 10 MG tablet, Take 1 tablet (10 mg) by mouth nightly as needed for muscle spasms (Patient not taking: Reported on 2/3/2023), Disp: 30 tablet, Rfl: 0     Physical Exam       BP: 130/77 Pulse: 56            Vital Signs with Ranges  Pulse:  [56] 56  BP: (130)/(77) 130/77  138 lbs 6.4 oz    Constitutional: cooperative, no apparent distress, sitting  "comfortably in chair. [place]ompanied by his wife and [facility].  Vascular: bilateral DP, PT, radial pulses palpable  Musculoskeletal: grossly normal and symmetric ROM and strength in BL extremities   Neurologic: Awake, alert, oriented to name, place, time, and situation    Imaging:  I have reviewed the following imaging studies:  US Aortoiliac (6/14/23): \"1. Patent aortoiliac arterial system. 2. Right  common iliac artery aneurysm measures 2.90 x 2.28 cm, stable compared to previous study.\"    CT images - images not available (5/16/23): Right ROSINA aneurysm noted to be 2.8 cm, unchanged    US Aortoiliac (6/14/22): \"Right common iliac artery aneurysm, measuring 2.71 x 2.93 cm in maximal diameter, without significant interval change from prior studies. 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 No significant changes from prior studies. Well-preserved arterial flow throughout aorta and iliac arteries.\"      Assessment & Plan   Mr. Alfaro is a 68 year old male with history of HTN, DL, remote history of gastric adenocarcinoma (stage II, T2 N1 M0, s/p Bilroth 1 partial gastrectomy in 2012) and well-controlled DM, who presents for follow-up evaluation of a right common iliac artery aneurysm. His right ROSINA is approximately 3.0 cm in greatest diameter, with aneurysmal enlargement extending to both the aortic bifurcation and the iliac bifurcation.       He remains on lovastatin; less than ideal control of his LDL (111, goal is < 70). Defer to PCP, Dr. Ford, but Mr. Alfaro may benefit from higher-intensity statin therapy.     Discussed that it is fine for him to travel (has a trip to [place] and [place] planned this fall to visit family). OK to fly.     Will obtain imaging of the BLE to exclude lower extremity ectasia or aneurysms along with routine US aorto/iliac in 1 year for follow-up of the aneurysm    Discussed the aortic " ectasia (repair indicated if > 5.5 cm) and the stable R ROSINA aneurysm (repair indicated if > 3.5 cm).    Will see him in 1 year for follow-up, or sooner if any questions or concerns arise    Marlys Luna MD    Total time spent on the date of this encounter doing: chart review, review of test results, patient visit, physical exam, education, counseling, developing plan of care, and documenting = 30 minutes

## 2023-06-14 NOTE — PROGRESS NOTES
[facility]        Patient is here for a  follow up.      Pt is currently taking Statin.    /77 (BP Location: Left arm, Patient Position: Chair, Cuff Size: Adult Regular)   Pulse 56   Wt 138 lb 6.4 oz (62.8 kg)   BMI 25.57 kg/m      The provider has been notified that the patient has no concerns.     Questions patient would like addressed today are: N/A.    Refills are needed: N/A    Has homecare services and agency name:  Donna Beasley MA

## 2023-06-23 ENCOUNTER — OFFICE VISIT (OUTPATIENT)
Dept: PEDIATRICS | Facility: CLINIC | Age: 68
End: 2023-06-23
Payer: COMMERCIAL

## 2023-06-23 VITALS
TEMPERATURE: 97.4 F | RESPIRATION RATE: 20 BRPM | HEIGHT: 62 IN | BODY MASS INDEX: 25.52 KG/M2 | DIASTOLIC BLOOD PRESSURE: 60 MMHG | SYSTOLIC BLOOD PRESSURE: 106 MMHG | WEIGHT: 138.7 LBS | OXYGEN SATURATION: 98 % | HEART RATE: 62 BPM

## 2023-06-23 DIAGNOSIS — E11.9 TYPE 2 DIABETES MELLITUS WITHOUT COMPLICATION, WITHOUT LONG-TERM CURRENT USE OF INSULIN (H): ICD-10-CM

## 2023-06-23 PROCEDURE — 99213 OFFICE O/P EST LOW 20 MIN: CPT | Performed by: INTERNAL MEDICINE

## 2023-06-23 ASSESSMENT — PAIN SCALES - GENERAL: PAINLEVEL: NO PAIN (0)

## 2023-06-23 NOTE — PROGRESS NOTES
"  Assessment & Plan       ICD-10-CM    1. Type 2 diabetes mellitus without complication, without long-term current use of insulin (H)  E11.9 metFORMIN (GLUCOPHAGE) 1000 MG tablet        Return metformin to 1000 mg BID dosing.  He has a visit scheduled in August.   Recheck A1C at that time.     Mazin Ford MD  [facility] ALMA ROSA Doe is a 68 year old, presenting for the following health issues:  Follow Up        6/23/2023     8:32 AM   Additional Questions   Roomed by RHS   Accompanied by Wife- May         6/23/2023     8:32 AM   Patient Reported Additional Medications   Patient reports taking the following new medications none     HPI     Patient is here today to follow up from oncology visits.     Had PSA level checked. He has not been told the result as of yet.  Concern about an enlarged prostate with his other cancer hx.  I was able to get the result, 2.30.     Type 2 DM.  Unfortunately his metformin rx was changed from 1,000 mg BID to 500 mg BID in February.  Is noting slight increase in blood sugar levels.          Objective    /60 (BP Location: Right arm, Patient Position: Sitting, Cuff Size: Adult Regular)   Pulse 62   Temp 97.4  F (36.3  C) (Temporal)   Resp 20   Ht 1.567 m (5' 1.69\")   Wt 62.9 kg (138 lb 11.2 oz)   SpO2 98%   BMI 25.62 kg/m    Body mass index is 25.62 kg/m .  Physical Exam   GEN: No distress  SKIN: No rashes  LUNGS: Clear to auscultation bilaterally. No rhonchi, rales, wheezes or retractions.  CV: Regular rate and rhythm.  No murmurs, rubs or gallops. Pulses 2+ radial.       "

## 2023-08-01 ENCOUNTER — LAB (OUTPATIENT)
Dept: LAB | Facility: CLINIC | Age: 68
End: 2023-08-01
Payer: COMMERCIAL

## 2023-08-01 DIAGNOSIS — E11.9 TYPE 2 DIABETES MELLITUS WITHOUT COMPLICATION, WITHOUT LONG-TERM CURRENT USE OF INSULIN (H): ICD-10-CM

## 2023-08-01 LAB — HBA1C MFR BLD: 7.2 % (ref 0–5.6)

## 2023-08-01 PROCEDURE — 83036 HEMOGLOBIN GLYCOSYLATED A1C: CPT

## 2023-08-01 PROCEDURE — 36415 COLL VENOUS BLD VENIPUNCTURE: CPT

## 2023-08-04 ENCOUNTER — OFFICE VISIT (OUTPATIENT)
Dept: PEDIATRICS | Facility: CLINIC | Age: 68
End: 2023-08-04
Payer: COMMERCIAL

## 2023-08-04 VITALS
TEMPERATURE: 97.6 F | HEART RATE: 65 BPM | WEIGHT: 138.3 LBS | RESPIRATION RATE: 16 BRPM | BODY MASS INDEX: 25.55 KG/M2 | DIASTOLIC BLOOD PRESSURE: 66 MMHG | OXYGEN SATURATION: 99 % | SYSTOLIC BLOOD PRESSURE: 108 MMHG

## 2023-08-04 DIAGNOSIS — E78.5 HYPERLIPIDEMIA LDL GOAL <100: ICD-10-CM

## 2023-08-04 DIAGNOSIS — E11.9 TYPE 2 DIABETES MELLITUS WITHOUT COMPLICATION, WITHOUT LONG-TERM CURRENT USE OF INSULIN (H): Primary | ICD-10-CM

## 2023-08-04 DIAGNOSIS — I10 ESSENTIAL HYPERTENSION: ICD-10-CM

## 2023-08-04 DIAGNOSIS — Z12.5 SCREENING FOR PROSTATE CANCER: ICD-10-CM

## 2023-08-04 PROCEDURE — 99214 OFFICE O/P EST MOD 30 MIN: CPT | Performed by: INTERNAL MEDICINE

## 2023-08-04 ASSESSMENT — PAIN SCALES - GENERAL: PAINLEVEL: NO PAIN (0)

## 2023-08-04 NOTE — PROGRESS NOTES
Assessment & Plan       ICD-10-CM    1. Type 2 diabetes mellitus without complication, without long-term current use of insulin (H)  E11.9       2. HYPERLIPIDEMIA LDL GOAL <100  E78.5       3. Hypertension  I10         Overall doing well.  DM has been well controlled. Continue w/ current meds.  Lipids and BP are also controlled.    Future labwork ordered for 6 mo recheck.     Mazin Ford MD  [facility] AMLA ROSA Doe is a 68 year old, presenting for the following health issues:  Diabetes        8/4/2023     8:19 AM   Additional Questions   Roomed by RHS   Accompanied by wife-May         8/4/2023     8:19 AM   Patient Reported Additional Medications   Patient reports taking the following new medications none       HPI     Diabetes Follow-up    How often are you checking your blood sugar? A few times a week  What time of day are you checking your blood sugars (select all that apply)?  Before meals  Have you had any blood sugars above 200?  Yes after eating   Have you had any blood sugars below 70?  No  What symptoms do you notice when your blood sugar is low?  None  What concerns do you have today about your diabetes? None   Do you have any of these symptoms? (Select all that apply)  No numbness or tingling in feet.  No redness, sores or blisters on feet.  No complaints of excessive thirst.  No reports of blurry vision.  No significant changes to weight.   Have you had a diabetic eye exam in the last 12 months? No        BP Readings from Last 2 Encounters:   08/04/23 108/66   06/23/23 106/60     Hemoglobin A1C (%)   Date Value   08/01/2023 7.2 (H)   02/03/2023 7.9 (H)   02/05/2021 6.7 (H)   08/14/2020 7.3 (H)     LDL Cholesterol Calculated (mg/dL)   Date Value   02/03/2023 111 (H)   01/31/2022 88   02/05/2021 98   02/07/2020 93       How many servings of fruits and vegetables do you eat daily?  2-3  On average, how many sweetened beverages do you drink each day (Examples: soda, juice,  sweet tea, etc.  Do NOT count diet or artificially sweetened beverages)?   1  How many days per week do you exercise enough to make your heart beat faster? 3 or less  How many minutes a day do you exercise enough to make your heart beat faster? 9 or less  How many days per week do you miss taking your medication? 0    Type 2 DM. Has been under good control.    HTN. No cardiac sx such as CP, palpitations, PND, orthopnea, VILLALOBOS or peripheral edema.  BP Readings from Last 3 Encounters:   08/04/23 108/66   06/23/23 106/60   06/14/23 130/77     Hyperlipidemia. Tolerating statin.  Lab Results   Component Value Date     02/03/2023    LDL 88 01/31/2022    LDL 98 02/05/2021    LDL 93 02/07/2020           Objective    /66 (BP Location: Right arm, Patient Position: Sitting, Cuff Size: Adult Regular)   Pulse 65   Temp 97.6  F (36.4  C) (Tympanic)   Resp 16   Wt 62.7 kg (138 lb 4.8 oz)   SpO2 99%   BMI 25.55 kg/m    Body mass index is 25.55 kg/m .  Physical Exam   GENERAL: healthy, alert and no distress  EYES: PERRL, EOMI  HENT: ear canals and TM's normal. No nasal discharge. OP moist.  NECK: no adenopathy  RESP: lungs clear to auscultation - no rales, rhonchi or wheezes  CV: regular rate and rhythm, normal S1 S2, no murmur, no peripheral edema and peripheral pulses strong  ABDOMEN: soft, nontender, bowel sounds normal  MS: no gross musculoskeletal defects noted  SKIN: no suspicious lesions or rashes  NEURO: Normal strength and tone  PSYCH: mentation appears normal, affect normal/bright

## 2024-01-04 ENCOUNTER — PATIENT OUTREACH (OUTPATIENT)
Dept: CARE COORDINATION | Facility: CLINIC | Age: 69
End: 2024-01-04
Payer: COMMERCIAL

## 2024-01-18 ENCOUNTER — PATIENT OUTREACH (OUTPATIENT)
Dept: CARE COORDINATION | Facility: CLINIC | Age: 69
End: 2024-01-18
Payer: COMMERCIAL

## 2024-02-01 ENCOUNTER — LAB (OUTPATIENT)
Dept: LAB | Facility: CLINIC | Age: 69
End: 2024-02-01
Payer: COMMERCIAL

## 2024-02-01 DIAGNOSIS — Z12.5 SCREENING FOR PROSTATE CANCER: ICD-10-CM

## 2024-02-01 DIAGNOSIS — I10 ESSENTIAL HYPERTENSION: ICD-10-CM

## 2024-02-01 DIAGNOSIS — E78.5 HYPERLIPIDEMIA LDL GOAL <100: ICD-10-CM

## 2024-02-01 DIAGNOSIS — E11.9 TYPE 2 DIABETES MELLITUS WITHOUT COMPLICATION, WITHOUT LONG-TERM CURRENT USE OF INSULIN (H): ICD-10-CM

## 2024-02-01 LAB
ALBUMIN SERPL BCG-MCNC: 4.4 G/DL (ref 3.5–5.2)
ALP SERPL-CCNC: 61 U/L (ref 40–150)
ALT SERPL W P-5'-P-CCNC: 19 U/L (ref 0–70)
ANION GAP SERPL CALCULATED.3IONS-SCNC: 9 MMOL/L (ref 7–15)
AST SERPL W P-5'-P-CCNC: 27 U/L (ref 0–45)
BILIRUB SERPL-MCNC: 0.6 MG/DL
BUN SERPL-MCNC: 21.8 MG/DL (ref 8–23)
CALCIUM SERPL-MCNC: 9.5 MG/DL (ref 8.8–10.2)
CHLORIDE SERPL-SCNC: 101 MMOL/L (ref 98–107)
CHOLEST SERPL-MCNC: 178 MG/DL
CREAT SERPL-MCNC: 1.32 MG/DL (ref 0.67–1.17)
CREAT UR-MCNC: 88.8 MG/DL
DEPRECATED HCO3 PLAS-SCNC: 25 MMOL/L (ref 22–29)
EGFRCR SERPLBLD CKD-EPI 2021: 59 ML/MIN/1.73M2
FASTING STATUS PATIENT QL REPORTED: YES
GLUCOSE SERPL-MCNC: 144 MG/DL (ref 70–99)
HDLC SERPL-MCNC: 48 MG/DL
LDLC SERPL CALC-MCNC: 104 MG/DL
MICROALBUMIN UR-MCNC: 15.4 MG/L
MICROALBUMIN/CREAT UR: 17.34 MG/G CR (ref 0–17)
NONHDLC SERPL-MCNC: 130 MG/DL
POTASSIUM SERPL-SCNC: 5.5 MMOL/L (ref 3.4–5.3)
PROT SERPL-MCNC: 7.4 G/DL (ref 6.4–8.3)
PSA SERPL DL<=0.01 NG/ML-MCNC: 2.14 NG/ML (ref 0–4.5)
SODIUM SERPL-SCNC: 135 MMOL/L (ref 135–145)
TRIGL SERPL-MCNC: 132 MG/DL
TSH SERPL DL<=0.005 MIU/L-ACNC: 4.18 UIU/ML (ref 0.3–4.2)

## 2024-02-01 PROCEDURE — 36415 COLL VENOUS BLD VENIPUNCTURE: CPT

## 2024-02-01 PROCEDURE — 84443 ASSAY THYROID STIM HORMONE: CPT

## 2024-02-01 PROCEDURE — G0103 PSA SCREENING: HCPCS

## 2024-02-01 PROCEDURE — 82043 UR ALBUMIN QUANTITATIVE: CPT

## 2024-02-01 PROCEDURE — 80061 LIPID PANEL: CPT

## 2024-02-01 PROCEDURE — 82570 ASSAY OF URINE CREATININE: CPT

## 2024-02-01 PROCEDURE — 80053 COMPREHEN METABOLIC PANEL: CPT

## 2024-02-02 ENCOUNTER — TELEPHONE (OUTPATIENT)
Dept: PEDIATRICS | Facility: CLINIC | Age: 69
End: 2024-02-02

## 2024-02-02 ENCOUNTER — LAB (OUTPATIENT)
Dept: LAB | Facility: CLINIC | Age: 69
End: 2024-02-02
Payer: COMMERCIAL

## 2024-02-02 DIAGNOSIS — E11.9 TYPE 2 DIABETES MELLITUS WITHOUT COMPLICATION, WITHOUT LONG-TERM CURRENT USE OF INSULIN (H): ICD-10-CM

## 2024-02-02 DIAGNOSIS — E87.5 HYPERKALEMIA: Primary | ICD-10-CM

## 2024-02-02 DIAGNOSIS — E87.5 HYPERKALEMIA: ICD-10-CM

## 2024-02-02 DIAGNOSIS — R79.89 ELEVATED SERUM CREATININE: ICD-10-CM

## 2024-02-02 LAB
ANION GAP SERPL CALCULATED.3IONS-SCNC: 11 MMOL/L (ref 3–14)
BUN SERPL-MCNC: 21 MG/DL (ref 7–30)
CALCIUM SERPL-MCNC: 10 MG/DL (ref 8.5–10.1)
CHLORIDE BLD-SCNC: 101 MMOL/L (ref 94–109)
CO2 SERPL-SCNC: 28 MMOL/L (ref 20–32)
CREAT SERPL-MCNC: 1.4 MG/DL (ref 0.66–1.25)
EGFRCR SERPLBLD CKD-EPI 2021: 55 ML/MIN/1.73M2
GLUCOSE BLD-MCNC: 110 MG/DL (ref 70–99)
POTASSIUM BLD-SCNC: 4.8 MMOL/L (ref 3.4–5.3)
SODIUM SERPL-SCNC: 140 MMOL/L (ref 135–145)

## 2024-02-02 PROCEDURE — 80048 BASIC METABOLIC PNL TOTAL CA: CPT

## 2024-02-02 PROCEDURE — 36415 COLL VENOUS BLD VENIPUNCTURE: CPT

## 2024-02-02 NOTE — TELEPHONE ENCOUNTER
Called patient via  to relay provider message below. Pt verbalized understanding and agrees with plan. Scheduled pt to be seen. Pt prefers call back with results d/t needing .   Nikos Fournier RN on 2/2/2024 at 9:40 AM

## 2024-02-02 NOTE — TELEPHONE ENCOUNTER
Covering for Dr. Ford  Please call pt-  Labs came back and potassium is a little high. I want to ensure this is rechecked TODAY before the weekend. Pls let him know and get him scheduled.   Thanks  JEANNETTE Wilkes, CNP          -ordered as stat

## 2024-02-05 ENCOUNTER — OFFICE VISIT (OUTPATIENT)
Dept: PEDIATRICS | Facility: CLINIC | Age: 69
End: 2024-02-05
Payer: COMMERCIAL

## 2024-02-05 VITALS
HEART RATE: 63 BPM | OXYGEN SATURATION: 98 % | SYSTOLIC BLOOD PRESSURE: 118 MMHG | WEIGHT: 141 LBS | DIASTOLIC BLOOD PRESSURE: 75 MMHG | RESPIRATION RATE: 16 BRPM | TEMPERATURE: 97.1 F | BODY MASS INDEX: 26.05 KG/M2

## 2024-02-05 DIAGNOSIS — I72.3 ILIAC ARTERY ANEURYSM, RIGHT (H): ICD-10-CM

## 2024-02-05 DIAGNOSIS — E78.5 HYPERLIPIDEMIA LDL GOAL <100: ICD-10-CM

## 2024-02-05 DIAGNOSIS — I10 ESSENTIAL HYPERTENSION: ICD-10-CM

## 2024-02-05 DIAGNOSIS — Z85.028 HISTORY OF GASTRIC CANCER: ICD-10-CM

## 2024-02-05 DIAGNOSIS — E11.9 TYPE 2 DIABETES MELLITUS WITHOUT COMPLICATION, WITHOUT LONG-TERM CURRENT USE OF INSULIN (H): Primary | ICD-10-CM

## 2024-02-05 LAB — HBA1C MFR BLD: 7.3 % (ref 0–5.6)

## 2024-02-05 PROCEDURE — 83036 HEMOGLOBIN GLYCOSYLATED A1C: CPT | Performed by: INTERNAL MEDICINE

## 2024-02-05 PROCEDURE — 99214 OFFICE O/P EST MOD 30 MIN: CPT | Performed by: INTERNAL MEDICINE

## 2024-02-05 PROCEDURE — 36415 COLL VENOUS BLD VENIPUNCTURE: CPT | Performed by: INTERNAL MEDICINE

## 2024-02-05 RX ORDER — LOVASTATIN 40 MG
40 TABLET ORAL AT BEDTIME
Qty: 90 TABLET | Refills: 4 | Status: SHIPPED | OUTPATIENT
Start: 2024-02-05

## 2024-02-05 RX ORDER — GLIPIZIDE 5 MG/1
5 TABLET, FILM COATED, EXTENDED RELEASE ORAL DAILY
Qty: 90 TABLET | Refills: 3 | Status: SHIPPED | OUTPATIENT
Start: 2024-02-05

## 2024-02-05 RX ORDER — OMEPRAZOLE 40 MG/1
40 CAPSULE, DELAYED RELEASE ORAL DAILY
Qty: 90 CAPSULE | Refills: 3 | Status: SHIPPED | OUTPATIENT
Start: 2024-02-05

## 2024-02-05 RX ORDER — LISINOPRIL 5 MG/1
5 TABLET ORAL DAILY
Qty: 90 TABLET | Refills: 4 | Status: SHIPPED | OUTPATIENT
Start: 2024-02-05

## 2024-02-05 ASSESSMENT — PAIN SCALES - GENERAL: PAINLEVEL: NO PAIN (0)

## 2024-02-05 NOTE — PROGRESS NOTES
Assessment & Plan       ICD-10-CM    1. Type 2 diabetes mellitus without complication, without long-term current use of insulin (H)  E11.9 HEMOGLOBIN A1C     blood glucose (NO BRAND SPECIFIED) lancets standard     blood glucose (NO BRAND SPECIFIED) test strip     blood glucose monitoring (NO BRAND SPECIFIED) meter device kit     glipiZIDE (GLUCOTROL XL) 5 MG 24 hr tablet     lisinopril (ZESTRIL) 5 MG tablet     metFORMIN (GLUCOPHAGE) 1000 MG tablet     HEMOGLOBIN A1C      2. Hypertension  I10 lisinopril (ZESTRIL) 5 MG tablet      3. Hyperlipidemia LDL goal <100  E78.5 lovastatin (MEVACOR) 40 MG tablet      4. History of gastric cancer  Z85.028 omeprazole (PRILOSEC) 40 MG DR capsule      5. Iliac artery aneurysm, right (H24)  I72.3         Overall doing well.  No changes made w/ medication doses today. Rx were updated.  Labwork reviewed.  Recommend follow-up in approx 6 months with me.    Mazin Ford MD    Saul Doe is a 68 year old, presenting for the following health issues:  Diabetes      2/5/2024     9:06 AM   Additional Questions   Roomed by S   Accompanied by Andre Lr interpretor via telephone         2/5/2024     9:06 AM   Patient Reported Additional Medications   Patient reports taking the following new medications none     History of Present Illness       Diabetes:   He presents for follow up of diabetes.  He is checking home blood glucose a few times a month.   He checks blood glucose before meals and at bedtime.  Blood glucose is sometimes over 200 and never under 70. He is aware of hypoglycemia symptoms including none and shakiness.   He is concerned about blood sugar frequently over 200.    He is not experiencing numbness or burning in feet, excessive thirst, blurry vision, weight changes or redness, sores or blisters on feet. The patient has not had a diabetic eye exam in the last 12 months.      He consumes 0 sweetened beverage(s) daily. He exercises with enough effort to increase  his heart rate 3 or less days per week. He is missing 1 dose(s) of medications per week.  He is not taking prescribed medications regularly due to remembering to take.       Nader is here to  review his chronic medical issues.    Type 2 diabetes. Has been controlled.  Lab Results   Component Value Date    A1C 7.3 02/05/2024    A1C 7.2 08/01/2023    A1C 6.7 02/05/2021    A1C 7.3 08/14/2020     HTN. No cardiac sx such as CP, palpitations, PND, orthopnea, VILLALOBOS or peripheral edema.  BP Readings from Last 3 Encounters:   02/05/24 118/75   08/04/23 108/66   06/23/23 106/60     Hyperlipidemia. Under good control.  Lab Results   Component Value Date     02/01/2024     02/03/2023    LDL 98 02/05/2021    LDL 93 02/07/2020     Hx of gastric cancer. No digestive sx at this time.    Hx of iliac artery aneurysm. Is followed by vascular.             Objective    /75 (BP Location: Right arm, Patient Position: Sitting, Cuff Size: Adult Regular)   Pulse 63   Temp 97.1  F (36.2  C) (Tympanic)   Resp 16   Wt 64 kg (141 lb)   SpO2 98%   BMI 26.05 kg/m    Body mass index is 26.05 kg/m .  Physical Exam   GENERAL: healthy, alert and no distress  EYES: PERRL, EOMI  HENT: ear canals and TM's normal. No nasal discharge. OP moist.  NECK: no adenopathy  RESP: lungs clear to auscultation - no rales, rhonchi or wheezes  CV: regular rate and rhythm, normal S1 S2, no murmur, no peripheral edema and peripheral pulses strong  ABDOMEN: soft, nontender, bowel sounds normal  MS: no gross musculoskeletal defects noted  SKIN: no suspicious lesions or rashes  NEURO: Normal strength and tone  PSYCH: mentation appears normal, affect normal/bright  Diabetic foot exam: normal DP and PT pulses, no trophic changes or ulcerative lesions, and normal sensory exam            Signed Electronically by: Mazin Ford MD     10

## 2024-03-24 ENCOUNTER — HEALTH MAINTENANCE LETTER (OUTPATIENT)
Age: 69
End: 2024-03-24

## 2024-06-21 ENCOUNTER — TRANSFERRED RECORDS (OUTPATIENT)
Dept: HEALTH INFORMATION MANAGEMENT | Facility: CLINIC | Age: 69
End: 2024-06-21
Payer: COMMERCIAL

## 2024-06-25 ENCOUNTER — HOSPITAL ENCOUNTER (OUTPATIENT)
Dept: ULTRASOUND IMAGING | Facility: CLINIC | Age: 69
Discharge: HOME | End: 2024-06-25
Attending: SURGERY
Payer: COMMERCIAL

## 2024-06-25 ENCOUNTER — OFFICE VISIT (OUTPATIENT)
Dept: OTHER | Facility: CLINIC | Age: 69
End: 2024-06-25
Payer: COMMERCIAL

## 2024-06-25 ENCOUNTER — TRANSFERRED RECORDS (OUTPATIENT)
Dept: HEALTH INFORMATION MANAGEMENT | Facility: CLINIC | Age: 69
End: 2024-06-25

## 2024-06-25 VITALS — HEART RATE: 72 BPM | SYSTOLIC BLOOD PRESSURE: 130 MMHG | DIASTOLIC BLOOD PRESSURE: 68 MMHG

## 2024-06-25 DIAGNOSIS — I72.4 ANEURYSM OF ARTERY OF LOWER EXTREMITY (H): ICD-10-CM

## 2024-06-25 DIAGNOSIS — I72.3 ILIAC ARTERY ANEURYSM, RIGHT (H): ICD-10-CM

## 2024-06-25 DIAGNOSIS — I72.3 ILIAC ARTERY ANEURYSM, RIGHT (H): Primary | ICD-10-CM

## 2024-06-25 PROCEDURE — 93925 LOWER EXTREMITY STUDY: CPT

## 2024-06-25 PROCEDURE — 99203 OFFICE O/P NEW LOW 30 MIN: CPT

## 2024-06-25 PROCEDURE — G0463 HOSPITAL OUTPT CLINIC VISIT: HCPCS | Mod: 25

## 2024-06-25 PROCEDURE — 93978 VASCULAR STUDY: CPT

## 2024-06-25 NOTE — PROGRESS NOTES
VASCULAR SURGERY PROGRESS NOTE    [facility]    Nader Alfaro  Medical Record #: [id number]  YOB: 1955  Age: 69 year old     Date of Service: 6/25/2024    PRIMARY CARE PROVIDER: Mazin Ford    Reason for visit:  Aneurysm surveillance    Mr. Nader Alfaro is a 69 year old male with a history of hypertension, hyperlipidemia, remote history of gastric adenocarcinoma, and well-controlled DM, he is being followed for a right common iliac artery. No major changes to his health in the past year.  His right ROSINA is approximately 3.0 cm in greatest diameter, with aneurysmal enlargement extending to both the aortic bifurcation and the iliac bifurcation.      On examination today Nader has no complaints, palpable pulses bilaterally            RECOMMENDATION/RISKS: Discussed with Mr. 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     We will obtain repeat imaging in 1 years time to monitor aneurysms, we will not need ultrasound of the lower extremities. Follow-up sooner if any concerns.       REVIEW OF SYSTEMS:    A 12 point ROS was reviewed and is negative except for what is listed above in HPI.    PHH:    Past Medical History:   Diagnosis Date    Essential hypertension     Gastric cancer (H) 08/08/2012    GERD (gastroesophageal reflux disease)     Hyperlipidemia     Thyroid nodule 09/28/2020    Type 2 diabetes mellitus (H)           Past Surgical History:   Procedure Laterality Date    Carpel tunnel surgery Right     COLONOSCOPY  07/05/2012    Procedure: COLONOSCOPY;  COLONOSCOPY & ESOPHAGOSCOPY, GASTROSCOPY, DUODENOSCOPY (EGD) and cold bx pre-pyloric large ulcer/mass/WW;  Surgeon: Endy Radford MD;  Location:  GI    ESOPHAGOSCOPY, GASTROSCOPY, DUODENOSCOPY (EGD), COMBINED      GASTRECTOMY  08/08/2012    Procedure: GASTRECTOMY;  Partial GASTRECTOMY, Bilroth 1  reconstruction,  with lymph node dissection;  Surgeon: Jonathan Frausto MD;  Location:  OR       ALLERGIES:  Seasonal allergies    MEDS:    Current Outpatient Medications:     blood glucose (NO BRAND SPECIFIED) lancets standard, Use to test blood sugar daily or as directed., Disp: 100 each, Rfl: 3    blood glucose (NO BRAND SPECIFIED) test strip, Use to test blood sugar daily or as directed., Disp: 100 strip, Rfl: 3    blood glucose monitoring (NO BRAND SPECIFIED) meter device kit, Use to test blood sugar daily or as directed., Disp: 1 kit, Rfl: 1    cetirizine (ZYRTEC) 10 MG tablet, Take 10 mg by mouth daily, Disp: , Rfl:     clotrimazole (LOTRIMIN) 1 % external cream, Apply topically 2 times daily (Patient not taking: Reported on 2/3/2023), Disp: 60 g, Rfl: 1    cyclobenzaprine (FLEXERIL) 10 MG tablet, Take 1 tablet (10 mg) by mouth nightly as needed for muscle spasms (Patient not taking: Reported on 2/3/2023), Disp: 30 tablet, Rfl: 0    glipiZIDE (GLUCOTROL XL) 5 MG 24 hr tablet, Take 1 tablet (5 mg) by mouth daily, Disp: 90 tablet, Rfl: 3    lisinopril (ZESTRIL) 5 MG tablet, Take 1 tablet (5 mg) by mouth daily, Disp: 90 tablet, Rfl: 4    lovastatin (MEVACOR) 40 MG tablet, Take 1 tablet (40 mg) by mouth at bedtime, Disp: 90 tablet, Rfl: 4    metFORMIN (GLUCOPHAGE) 1000 MG tablet, Take 1 tablet (1,000 mg) by mouth 2 times daily (with meals), Disp: 180 tablet, Rfl: 3    omeprazole (PRILOSEC) 40 MG DR capsule, Take 1 capsule (40 mg) by mouth daily, Disp: 90 capsule, Rfl: 3    triamcinolone (KENALOG) 0.1 % external cream, Apply topically 2 times daily, Disp: 60 g, Rfl: 1    SOCIAL HABITS:    History   Smoking Status    Former    Packs/day: 0.10    Years: 15.00    Types: Cigarettes    Quit date: 1/1/1990   Smokeless Tobacco    Never     Social History    Substance and Sexual Activity      Alcohol use: Not Currently        Alcohol/week: 0.0 standard drinks of alcohol      History   Drug Use No       FAMILY HISTORY:     Family History   Problem Relation Age of Onset    C.A.D. No family hx of     Diabetes No family hx of     Hypertension No family hx of     Cerebrovascular Disease No family hx of     Breast Cancer No family hx of     Cancer - colorectal No family hx of     Prostate Cancer No family hx of        PE:  There were no vitals taken for this visit.  Wt Readings from Last 1 Encounters:   02/05/24 141 lb (64 kg)     There is no height or weight on file to calculate BMI.    EXAM:  GENERAL: well-developed 69 year old male who appears his stated age  CARDIAC: normal   CHEST/LUNG: normal respiratory effort   MUSCULOSKELETAL: grossly normal and both lower extremities are intact, no lower extremity edema  NEUROLOGIC: focally intact, alert and oriented x 3  PSYCH: appropriate affect  VASCULAR:     DIAGNOSTIC STUDIES:     Images:  No results found.    LABS:      Sodium   Date Value Ref Range Status   02/02/2024 140 135 - 145 mmol/L Final   02/01/2024 135 135 - 145 mmol/L Final     Comment:     Reference intervals for this test were updated on 09/26/2023 to more accurately reflect our healthy population. There may be differences in the flagging of prior results with similar values performed with this method. Interpretation of those prior results can be made in the context of the updated reference intervals.    02/03/2023 134 (L) 136 - 145 mmol/L Final   02/05/2021 136 133 - 144 mmol/L Final   02/07/2020 135 133 - 144 mmol/L Final   05/09/2019 133 133 - 144 mmol/L Final     Urea Nitrogen   Date Value Ref Range Status   02/02/2024 21 7 - 30 mg/dL Final   02/01/2024 21.8 8.0 - 23.0 mg/dL Final   02/03/2023 18.1 8.0 - 23.0 mg/dL Final   01/31/2022 16 7 - 30 mg/dL Final   02/05/2021 18 7 - 30 mg/dL Final   02/07/2020 24 7 - 30 mg/dL Final   05/09/2019 23 7 - 30 mg/dL Final     Hemoglobin   Date Value Ref Range Status   10/20/2014 12.6 (L) 13.3 - 17.7 g/dL Final   08/17/2012 11.4 (L) 13.3 - 17.7 g/dL Final   08/16/2012 11.9 (L) 13.3 -  17.7 g/dL Final     Platelet Count   Date Value Ref Range Status   10/20/2014 120 (L) 150 - 450 10e9/L Final   08/17/2012 171 150 - 450 10e9/L Final   08/16/2012 172 150 - 450 10e9/L Final     INR   Date Value Ref Range Status   10/20/2014 1.01 0.86 - 1.14 Final   07/04/2012 1.06 0.86 - 1.14 Final       20 minutes spent on the day of encounter doing chart review, history and exam, documentation, and further activities as noted.     Darlene Larson, NP  VASCULAR SURGERY

## 2024-06-25 NOTE — PROGRESS NOTES
[facility]        Patient is here for a  follow up.    Pt is currently taking no meds that would impact our treatment plan.    /68 (BP Location: Left arm, Patient Position: Chair, Cuff Size: Adult Regular)   Pulse 72     The provider has been notified that the patient has no concerns.     Questions patient would like addressed today are: N/A.    Refills are needed: N/A    Has homecare services and agency name:  Donna Beasley MA

## 2024-07-25 ENCOUNTER — DOCUMENTATION ONLY (OUTPATIENT)
Dept: LAB | Facility: CLINIC | Age: 69
End: 2024-07-25
Payer: COMMERCIAL

## 2024-07-25 DIAGNOSIS — E11.9 TYPE 2 DIABETES MELLITUS WITHOUT COMPLICATION, WITHOUT LONG-TERM CURRENT USE OF INSULIN (H): Primary | ICD-10-CM

## 2024-07-25 NOTE — PROGRESS NOTES
Nader Alfaro has an upcoming lab appointment:    Future Appointments   Date Time Provider Department Center   8/2/2024  8:00 AM [facility]   8/5/2024 10:00 AM Mazin Ford MD [facility]     Patient is scheduled for the following lab(s): A1C    There is no order available. Please review and place either future orders or HMPO (Review of Health Maintenance Protocol Orders), as appropriate.    Health Maintenance Due   Topic    ANNUAL REVIEW OF HM ORDERS     A1C      Chencho Alvarenga

## 2024-08-02 ENCOUNTER — LAB (OUTPATIENT)
Dept: LAB | Facility: CLINIC | Age: 69
End: 2024-08-02
Payer: COMMERCIAL

## 2024-08-02 DIAGNOSIS — E11.9 TYPE 2 DIABETES MELLITUS WITHOUT COMPLICATION, WITHOUT LONG-TERM CURRENT USE OF INSULIN (H): ICD-10-CM

## 2024-08-02 LAB — HBA1C MFR BLD: 6.8 % (ref 0–5.6)

## 2024-08-02 PROCEDURE — 36415 COLL VENOUS BLD VENIPUNCTURE: CPT

## 2024-08-02 PROCEDURE — 83036 HEMOGLOBIN GLYCOSYLATED A1C: CPT

## 2024-08-05 ENCOUNTER — OFFICE VISIT (OUTPATIENT)
Dept: PEDIATRICS | Facility: CLINIC | Age: 69
End: 2024-08-05
Payer: COMMERCIAL

## 2024-08-05 VITALS
BODY MASS INDEX: 25.75 KG/M2 | OXYGEN SATURATION: 98 % | SYSTOLIC BLOOD PRESSURE: 126 MMHG | WEIGHT: 136.4 LBS | HEIGHT: 61 IN | HEART RATE: 70 BPM | DIASTOLIC BLOOD PRESSURE: 76 MMHG | TEMPERATURE: 97.6 F | RESPIRATION RATE: 16 BRPM

## 2024-08-05 DIAGNOSIS — E78.5 HYPERLIPIDEMIA LDL GOAL <100: ICD-10-CM

## 2024-08-05 DIAGNOSIS — E11.9 TYPE 2 DIABETES MELLITUS WITHOUT COMPLICATION, WITHOUT LONG-TERM CURRENT USE OF INSULIN (H): Primary | ICD-10-CM

## 2024-08-05 DIAGNOSIS — I10 ESSENTIAL HYPERTENSION: ICD-10-CM

## 2024-08-05 PROCEDURE — 99214 OFFICE O/P EST MOD 30 MIN: CPT | Performed by: INTERNAL MEDICINE

## 2024-08-05 NOTE — PROGRESS NOTES
Assessment & Plan       ICD-10-CM    1. Type 2 diabetes mellitus without complication, without long-term current use of insulin (H)  E11.9       2. Hypertension  I10       3. HYPERLIPIDEMIA LDL GOAL <100  E78.5         Overall he is doing well.  T2DM - under good control. No medication changes made today.  HTN - BP is controlled.   Hyperlipidemia - continue w/ statin.       Mazin Ford MD       Saul Doe is a 69 year old, presenting for the following health issues:  Diabetes        8/5/2024     9:36 AM   Additional Questions   Roomed by DC   Accompanied by wife         8/5/2024     9:36 AM   Patient Reported Additional Medications   Patient reports taking the following new medications None     HPI       Diabetes Follow-up    How often are you checking your blood sugar? One time daily  What time of day are you checking your blood sugars (select all that apply)?  Before meals  Have you had any blood sugars above 200?  No  Have you had any blood sugars below 70?  No  What symptoms do you notice when your blood sugar is low?  None  What concerns do you have today about your diabetes? None   Do you have any of these symptoms? (Select all that apply)  No numbness or tingling in feet.  No redness, sores or blisters on feet.  No complaints of excessive thirst.  No reports of blurry vision.  No significant changes to weight.  Have you had a diabetic eye exam in the last 12 months? No    Blood sugars have been doing well. A1C is lower than previous. Reviewed medications.    Hyperlipidemia. Also controlled. No issues w/ meds.    HTN. No cardiac sx such as CP, palpitations, PND, orthopnea, VILLALOBOS or peripheral edema.       BP Readings from Last 2 Encounters:   08/05/24 126/76   06/25/24 130/68     Hemoglobin A1C (%)   Date Value   08/02/2024 6.8 (H)   02/05/2024 7.3 (H)   02/05/2021 6.7 (H)   08/14/2020 7.3 (H)     LDL Cholesterol Calculated (mg/dL)   Date Value   02/01/2024 104 (H)   02/03/2023 111 (H)   02/05/2021 98  "  02/07/2020 93         How many servings of fruits and vegetables do you eat daily?  0-1  On average, how many sweetened beverages do you drink each day (Examples: soda, juice, sweet tea, etc.  Do NOT count diet or artificially sweetened beverages)?   1  How many days per week do you exercise enough to make your heart beat faster? 7  How many minutes a day do you exercise enough to make your heart beat faster? 60 or more  How many days per week do you miss taking your medication? 0        Objective    /76 (BP Location: Right arm, Patient Position: Sitting, Cuff Size: Adult Large)   Pulse 70   Temp 97.6  F (36.4  C) (Temporal)   Resp 16   Ht 1.562 m (5' 1.5\")   Wt 61.9 kg (136 lb 6.4 oz)   SpO2 98%   BMI 25.36 kg/m    Body mass index is 25.36 kg/m .  Physical Exam   GEN: No distress  NECK: Supple. No LAD or TM.  LUNGS: Clear to auscultation bilaterally. No rhonchi, rales, wheezes or retractions.  CV: Regular rate and rhythm.  No murmurs, rubs or gallops. Pulses 2+ radial.  ABD: Bowel sounds positive throughout. Soft, nontender, nondistended. No organomegaly. No masses.  EXTR: No edema  Diabetic foot exam: normal DP and PT pulses and normal sensory exam          Signed Electronically by: Mazin Ford MD    "

## 2024-08-19 DIAGNOSIS — E78.5 HYPERLIPIDEMIA LDL GOAL <100: ICD-10-CM

## 2024-08-19 DIAGNOSIS — E11.9 TYPE 2 DIABETES MELLITUS WITHOUT COMPLICATION, WITHOUT LONG-TERM CURRENT USE OF INSULIN (H): ICD-10-CM

## 2024-08-19 DIAGNOSIS — I10 ESSENTIAL HYPERTENSION: ICD-10-CM

## 2024-08-19 RX ORDER — GLIPIZIDE 5 MG/1
5 TABLET, FILM COATED, EXTENDED RELEASE ORAL DAILY
Qty: 90 TABLET | Refills: 3 | OUTPATIENT
Start: 2024-08-19

## 2024-08-19 RX ORDER — LOVASTATIN 40 MG
40 TABLET ORAL AT BEDTIME
Qty: 90 TABLET | Refills: 4 | OUTPATIENT
Start: 2024-08-19

## 2024-08-19 RX ORDER — LISINOPRIL 5 MG/1
5 TABLET ORAL DAILY
Qty: 90 TABLET | Refills: 4 | OUTPATIENT
Start: 2024-08-19

## 2025-01-06 ENCOUNTER — DOCUMENTATION ONLY (OUTPATIENT)
Dept: LAB | Facility: CLINIC | Age: 70
End: 2025-01-06
Payer: COMMERCIAL

## 2025-01-06 DIAGNOSIS — E11.9 TYPE 2 DIABETES MELLITUS WITHOUT COMPLICATION, WITHOUT LONG-TERM CURRENT USE OF INSULIN (H): Primary | ICD-10-CM

## 2025-01-06 DIAGNOSIS — E04.1 THYROID NODULE: ICD-10-CM

## 2025-01-06 DIAGNOSIS — I10 ESSENTIAL HYPERTENSION: ICD-10-CM

## 2025-01-27 ENCOUNTER — LAB (OUTPATIENT)
Dept: LAB | Facility: CLINIC | Age: 70
End: 2025-01-27
Payer: COMMERCIAL

## 2025-01-27 DIAGNOSIS — E11.9 TYPE 2 DIABETES MELLITUS WITHOUT COMPLICATION, WITHOUT LONG-TERM CURRENT USE OF INSULIN (H): ICD-10-CM

## 2025-01-27 DIAGNOSIS — E04.1 THYROID NODULE: ICD-10-CM

## 2025-01-27 DIAGNOSIS — I10 ESSENTIAL HYPERTENSION: ICD-10-CM

## 2025-01-27 LAB
ALBUMIN SERPL BCG-MCNC: 4.1 G/DL (ref 3.5–5.2)
ALP SERPL-CCNC: 66 U/L (ref 40–150)
ALT SERPL W P-5'-P-CCNC: 16 U/L (ref 0–70)
ANION GAP SERPL CALCULATED.3IONS-SCNC: 7 MMOL/L (ref 7–15)
AST SERPL W P-5'-P-CCNC: 20 U/L (ref 0–45)
BILIRUB SERPL-MCNC: 0.7 MG/DL
BUN SERPL-MCNC: 22 MG/DL (ref 8–23)
CALCIUM SERPL-MCNC: 9.7 MG/DL (ref 8.8–10.4)
CHLORIDE SERPL-SCNC: 98 MMOL/L (ref 98–107)
CHOLEST SERPL-MCNC: 159 MG/DL
CREAT SERPL-MCNC: 1.29 MG/DL (ref 0.67–1.17)
CREAT UR-MCNC: 71.9 MG/DL
EGFRCR SERPLBLD CKD-EPI 2021: 61 ML/MIN/1.73M2
EST. AVERAGE GLUCOSE BLD GHB EST-MCNC: 177 MG/DL
FASTING STATUS PATIENT QL REPORTED: YES
FASTING STATUS PATIENT QL REPORTED: YES
GLUCOSE SERPL-MCNC: 172 MG/DL (ref 70–99)
HBA1C MFR BLD: 7.8 % (ref 0–5.6)
HCO3 SERPL-SCNC: 26 MMOL/L (ref 22–29)
HDLC SERPL-MCNC: 46 MG/DL
LDLC SERPL CALC-MCNC: 87 MG/DL
MICROALBUMIN UR-MCNC: 23.8 MG/L
MICROALBUMIN/CREAT UR: 33.1 MG/G CR (ref 0–17)
NONHDLC SERPL-MCNC: 113 MG/DL
POTASSIUM SERPL-SCNC: 4.9 MMOL/L (ref 3.4–5.3)
PROT SERPL-MCNC: 7.2 G/DL (ref 6.4–8.3)
SODIUM SERPL-SCNC: 131 MMOL/L (ref 135–145)
T4 FREE SERPL-MCNC: 1.19 NG/DL (ref 0.9–1.7)
TRIGL SERPL-MCNC: 129 MG/DL
TSH SERPL DL<=0.005 MIU/L-ACNC: 4.51 UIU/ML (ref 0.3–4.2)

## 2025-01-27 PROCEDURE — 82570 ASSAY OF URINE CREATININE: CPT

## 2025-01-27 PROCEDURE — 84439 ASSAY OF FREE THYROXINE: CPT

## 2025-01-27 PROCEDURE — 82043 UR ALBUMIN QUANTITATIVE: CPT

## 2025-01-27 PROCEDURE — 80053 COMPREHEN METABOLIC PANEL: CPT

## 2025-01-27 PROCEDURE — 80061 LIPID PANEL: CPT

## 2025-01-27 PROCEDURE — 83036 HEMOGLOBIN GLYCOSYLATED A1C: CPT

## 2025-01-27 PROCEDURE — 36415 COLL VENOUS BLD VENIPUNCTURE: CPT

## 2025-01-27 PROCEDURE — 84443 ASSAY THYROID STIM HORMONE: CPT

## 2025-01-30 DIAGNOSIS — E11.9 TYPE 2 DIABETES MELLITUS WITHOUT COMPLICATION, WITHOUT LONG-TERM CURRENT USE OF INSULIN (H): ICD-10-CM

## 2025-01-30 DIAGNOSIS — Z85.028 HISTORY OF GASTRIC CANCER: ICD-10-CM

## 2025-01-30 RX ORDER — GLIPIZIDE 5 MG/1
5 TABLET, FILM COATED, EXTENDED RELEASE ORAL DAILY
Qty: 90 TABLET | Refills: 3 | Status: SHIPPED | OUTPATIENT
Start: 2025-01-30

## 2025-01-30 RX ORDER — GLIPIZIDE 5 MG/1
5 TABLET, FILM COATED, EXTENDED RELEASE ORAL DAILY
Qty: 90 TABLET | Refills: 3 | OUTPATIENT
Start: 2025-01-30

## 2025-01-30 RX ORDER — OMEPRAZOLE 40 MG/1
40 CAPSULE, DELAYED RELEASE ORAL DAILY
Qty: 90 CAPSULE | Refills: 3 | Status: SHIPPED | OUTPATIENT
Start: 2025-01-30

## 2025-01-30 RX ORDER — OMEPRAZOLE 40 MG/1
40 CAPSULE, DELAYED RELEASE ORAL DAILY
Qty: 90 CAPSULE | Refills: 3 | OUTPATIENT
Start: 2025-01-30

## 2025-02-03 ENCOUNTER — OFFICE VISIT (OUTPATIENT)
Dept: PEDIATRICS | Facility: CLINIC | Age: 70
End: 2025-02-03
Payer: COMMERCIAL

## 2025-02-03 VITALS
SYSTOLIC BLOOD PRESSURE: 116 MMHG | BODY MASS INDEX: 26.22 KG/M2 | WEIGHT: 138.9 LBS | DIASTOLIC BLOOD PRESSURE: 64 MMHG | HEART RATE: 67 BPM | TEMPERATURE: 97.8 F | OXYGEN SATURATION: 97 % | RESPIRATION RATE: 18 BRPM | HEIGHT: 61 IN

## 2025-02-03 DIAGNOSIS — I10 ESSENTIAL HYPERTENSION: ICD-10-CM

## 2025-02-03 DIAGNOSIS — E11.9 TYPE 2 DIABETES MELLITUS WITHOUT COMPLICATION, WITHOUT LONG-TERM CURRENT USE OF INSULIN (H): ICD-10-CM

## 2025-02-03 DIAGNOSIS — E78.5 HYPERLIPIDEMIA LDL GOAL <100: ICD-10-CM

## 2025-02-03 DIAGNOSIS — Z00.00 ANNUAL WELLNESS VISIT: Primary | ICD-10-CM

## 2025-02-03 PROCEDURE — 99214 OFFICE O/P EST MOD 30 MIN: CPT | Mod: 25 | Performed by: INTERNAL MEDICINE

## 2025-02-03 PROCEDURE — G0439 PPPS, SUBSEQ VISIT: HCPCS | Performed by: INTERNAL MEDICINE

## 2025-02-03 RX ORDER — LISINOPRIL 5 MG/1
5 TABLET ORAL DAILY
Qty: 90 TABLET | Refills: 4 | Status: SHIPPED | OUTPATIENT
Start: 2025-02-03

## 2025-02-03 RX ORDER — LOVASTATIN 40 MG/1
40 TABLET ORAL AT BEDTIME
Qty: 90 TABLET | Refills: 4 | Status: SHIPPED | OUTPATIENT
Start: 2025-02-03

## 2025-02-03 SDOH — HEALTH STABILITY: PHYSICAL HEALTH: ON AVERAGE, HOW MANY DAYS PER WEEK DO YOU ENGAGE IN MODERATE TO STRENUOUS EXERCISE (LIKE A BRISK WALK)?: 5 DAYS

## 2025-02-03 SDOH — HEALTH STABILITY: PHYSICAL HEALTH: ON AVERAGE, HOW MANY MINUTES DO YOU ENGAGE IN EXERCISE AT THIS LEVEL?: 40 MIN

## 2025-02-03 ASSESSMENT — PAIN SCALES - GENERAL: PAINLEVEL_OUTOF10: NO PAIN (0)

## 2025-02-03 ASSESSMENT — SOCIAL DETERMINANTS OF HEALTH (SDOH): HOW OFTEN DO YOU GET TOGETHER WITH FRIENDS OR RELATIVES?: ONCE A WEEK

## 2025-02-03 NOTE — PROGRESS NOTES
"Preventive Care Visit  [facility]  Mazin Ford MD, Internal Medicine - Pediatrics  Feb 3, 2025      Assessment & Plan       ICD-10-CM    1. Annual wellness visit  Z00.00       2. Type 2 diabetes mellitus without complication, without long-term current use of insulin (H)  E11.9 lisinopril (ZESTRIL) 5 MG tablet     metFORMIN (GLUCOPHAGE) 1000 MG tablet      3. Hypertension  I10 lisinopril (ZESTRIL) 5 MG tablet      4. Hyperlipidemia LDL goal <100  E78.5 lovastatin (MEVACOR) 40 MG tablet        Here for AWV. Overall he is feeling well. HM reviewed.    Also reviewed ongoing health issues.  Type 2 DM. Under overall good control. No medication changes made today.  HTN. BP is very good today. Continue w/ Lisinopril.  Hyperlipidemia. Continue Lovastatin.         BMI  Estimated body mass index is 26.24 kg/m  as calculated from the following:    Height as of this encounter: 1.549 m (5' 1\").    Weight as of this encounter: 63 kg (138 lb 14.4 oz).       Counseling  Appropriate preventive services were addressed with this patient via screening, questionnaire, or discussion as appropriate for fall prevention, nutrition, physical activity, social engagement, weight loss and cognition.  Checklist reviewing preventive services available has been given to the patient.  Reviewed patient's diet, addressing concerns and/or questions.   The patient was instructed to see the dentist every 6 months.     Mazin Ford MD      Saul Doe is a 69 year old, presenting for the following:  Medicare Visit        2/3/2025     9:47 AM   Additional Questions   Roomed by Jessica   Accompanied by spouse         2/3/2025     9:47 AM   Patient Reported Additional Medications   Patient reports taking the following new medications none           HPI    Here today for AWV. Used [employer] interpretor via video during the visit.   Overall he is feeling well. No acute concerns today.    We also reviewed his chronic health " history.  Type 2 diabetes. Overall under good control. A1C tends to be slightly higher for him in the wintertime.  Lab Results   Component Value Date    A1C 7.8 01/27/2025    A1C 6.8 08/02/2024           HTN. No cardiac sx such as CP, palpitations, PND, orthopnea, VILLALOBOS or peripheral edema.  BP Readings from Last 3 Encounters:   02/03/25 116/64   08/05/24 126/76   06/25/24 130/68     Hyperlipidemia. Under good control.  Lab Results   Component Value Date    LDL 87 01/27/2025     02/01/2024       Health Care Directive  Patient does not have a Health Care Directive:       2/3/2025   General Health   How would you rate your overall physical health? (!) FAIR   Feel stress (tense, anxious, or unable to sleep) Not at all         2/3/2025   Nutrition   Diet: Regular (no restrictions)         2/3/2025   Exercise   Days per week of moderate/strenous exercise 5 days   Average minutes spent exercising at this level 40 min         2/3/2025   Social Factors   Frequency of gathering with friends or relatives Once a week   Worry food won't last until get money to buy more No   Food not last or not have enough money for food? No   Do you have housing? (Housing is defined as stable permanent housing and does not include staying ouside in a car, in a tent, in an abandoned building, in an overnight shelter, or couch-surfing.) Yes   Are you worried about losing your housing? No   Lack of transportation? No   Unable to get utilities (heat,electricity)? No         2/3/2025   Fall Risk   Fallen 2 or more times in the past year? No    Trouble with walking or balance? No        Proxy-reported          2/3/2025   Activities of Daily Living- Home Safety   Needs help with the following daily activites None of the above   Safety concerns in the home None of the above         2/3/2025   Dental   Dentist two times every year? (!) NO         2/3/2025   Hearing Screening   Hearing concerns? None of the above         2/3/2025   Driving Risk  Screening   Patient/family members have concerns about driving No         2/3/2025   General Alertness/Fatigue Screening   Have you been more tired than usual lately? No         2/3/2025   Urinary Incontinence Screening   Bothered by leaking urine in past 6 months No         2/3/2025   TB Screening   Were you born outside of the [place]? Yes         Today's PHQ-2 Score:       2/3/2025     9:46 AM   PHQ-2 (  [employer])   Q1: Little interest or pleasure in doing things 0    Q2: Feeling down, depressed or hopeless 0    PHQ-2 Score 0    Q1: Little interest or pleasure in doing things Not at all   Q2: Feeling down, depressed or hopeless Not at all   PHQ-2 Score 0       Proxy-reported           2/3/2025   Substance Use   Alcohol more than 3/day or more than 7/wk No   Do you have a current opioid prescription? No   How severe/bad is pain from 1 to 10? 0/10 (No Pain)   Do you use any other substances recreationally? No     Social History     Tobacco Use    Smoking status: Former     Current packs/day: 0.00     Average packs/day: 0.1 packs/day for 15.0 years (1.5 ttl pk-yrs)     Types: Cigarettes     Start date: 1975     Quit date: 1990     Years since quittin.1     Passive exposure: Past    Smokeless tobacco: Never   Vaping Use    Vaping status: Never Used   Substance Use Topics    Alcohol use: Not Currently     Alcohol/week: 0.0 standard drinks of alcohol    Drug use: No       Last PSA:   PSA   Date Value Ref Range Status   2021 2.43 0 - 4 ug/L Final     Comment:     Assay Method:  Chemiluminescence using Siemens Vista analyzer     Prostate Specific Antigen Screen   Date Value Ref Range Status   2024 2.14 0.00 - 4.50 ng/mL Final     ASCVD Risk   The 10-year ASCVD risk score (Silvio WARD, et al., 2019) is: 23.8%    Values used to calculate the score:      Age: 69 years      Sex: Male      Is Non- : No      Diabetic: Yes      Tobacco smoker: No      Systolic Blood Pressure:  "116 mmHg      Is BP treated: Yes      HDL Cholesterol: 46 mg/dL      Total Cholesterol: 159 mg/dL    Current providers sharing in care for this patient include:  Patient Care Team:  Mazin Ford MD as PCP - General (Internal Medicine - Pediatrics)  Mazin Ford MD as Assigned PCP  Darlene Larson NP as Assigned Heart and Vascular Provider    The following health maintenance items are reviewed in Epic and correct as of today:  Health Maintenance   Topic Date Due    ANNUAL REVIEW OF  ORDERS  Never done    EYE EXAM  Never done    RSV VACCINE (1 - Risk 60-74 years 1-dose series) Never done    MEDICARE ANNUAL WELLNESS VISIT  02/03/2024    INFLUENZA VACCINE (1) 09/01/2024    COVID-19 Vaccine (8 - 2024-25 season) 04/29/2025    A1C  07/27/2025    DIABETIC FOOT EXAM  08/05/2025    BMP  01/27/2026    CMP  01/27/2026    LIPID  01/27/2026    MICROALBUMIN  01/27/2026    FALL RISK ASSESSMENT  02/03/2026    ADVANCE CARE PLANNING  02/03/2028    DTAP/TDAP/TD IMMUNIZATION (3 - Td or Tdap) 02/07/2030    COLORECTAL CANCER SCREENING  03/24/2030    HEPATITIS C SCREENING  Completed    PHQ-2 (once per calendar year)  Completed    Pneumococcal Vaccine: 50+ Years  Completed    ZOSTER IMMUNIZATION  Completed    AORTIC ANEURYSM SCREENING (SYSTEM ASSIGNED)  Completed    HPV IMMUNIZATION  Aged Out    MENINGITIS IMMUNIZATION  Aged Out    RSV MONOCLONAL ANTIBODY  Aged Out            Objective    Exam  /64 (BP Location: Right arm, Patient Position: Sitting, Cuff Size: Adult Regular)   Pulse 67   Temp 97.8  F (36.6  C) (Temporal)   Resp 18   Ht 1.549 m (5' 1\")   Wt 63 kg (138 lb 14.4 oz)   SpO2 97%   BMI 26.24 kg/m     Estimated body mass index is 26.24 kg/m  as calculated from the following:    Height as of this encounter: 1.549 m (5' 1\").    Weight as of this encounter: 63 kg (138 lb 14.4 oz).    Physical Exam  GENERAL: healthy, alert and no distress  EYES: PERRL, EOMI  HENT: ear canals and TM's normal. No nasal discharge. OP " moist.  NECK: no adenopathy  RESP: lungs clear to auscultation - no rales, rhonchi or wheezes  CV: regular rate and rhythm, normal S1 S2, no murmur, no peripheral edema  ABDOMEN: soft, nontender, bowel sounds normal  MS: no gross musculoskeletal defects noted  SKIN: no suspicious lesions or rashes  NEURO: Normal strength and tone  PSYCH: mentation appears normal, affect normal  Diabetic foot exam: normal DP and PT pulses, no trophic changes or ulcerative lesions, and normal sensory exam          2/3/2025   Mini Cog   Mini-Cog Not Completed (choose reason) Language barrier and no  present              Signed Electronically by: Mazin Ford MD

## 2025-06-19 ENCOUNTER — TRANSFERRED RECORDS (OUTPATIENT)
Dept: HEALTH INFORMATION MANAGEMENT | Facility: CLINIC | Age: 70
End: 2025-06-19
Payer: COMMERCIAL

## 2025-06-25 NOTE — PROGRESS NOTES
VASCULAR SURGERY PROGRESS NOTE    LOCATION[facility]    Nader Alfaro  Medical Record #: [id number]  YOB: 1955  Age: 70 year old     Date of Service: 6/26/2025    PRIMARY CARE PROVIDER: Mazin Ford    Reason for visit:  Aneurysm surveillance    Mr. Nader Alfaro is a 69 year old male with a history of hypertension, hyperlipidemia, remote history of gastric adenocarcinoma, and well-controlled DM, he is being followed for a right common iliac artery. No major changes to his health in the past year.  His right ROSINA is approximately 3.0 cm in greatest diameter, with aneurysmal enlargement extending to both the aortic bifurcation and the iliac bifurcation.       On examination today Nader has no complaints, palpable pulses bilaterally. No changes to health        RECOMMENDATION/RISKS: Discussed with Mr. 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      We will obtain repeat imaging in 1 years time to monitor aneurysms, we will not need ultrasound of the lower extremities. Follow-up sooner if any concerns.       REVIEW OF SYSTEMS:    A 12 point ROS was reviewed and is negative except for what is listed above in HPI.    PHH:    Past Medical History:   Diagnosis Date    Essential hypertension     Gastric cancer (H) 08/08/2012    GERD (gastroesophageal reflux disease)     Hyperlipidemia     Thyroid nodule 09/28/2020    Type 2 diabetes mellitus (H)           Past Surgical History:   Procedure Laterality Date    Carpel tunnel surgery Right     COLONOSCOPY  07/05/2012    Procedure: COLONOSCOPY;  COLONOSCOPY & ESOPHAGOSCOPY, GASTROSCOPY, DUODENOSCOPY (EGD) and cold bx pre-pyloric large ulcer/mass/WW;  Surgeon: Endy Radford MD;  Location:  GI    ESOPHAGOSCOPY, GASTROSCOPY, DUODENOSCOPY (EGD), COMBINED      GASTRECTOMY  08/08/2012    Procedure: GASTRECTOMY;  Partial GASTRECTOMY,  Bilroth 1 reconstruction,  with lymph node dissection;  Surgeon: Jonathan Frausto MD;  Location: [facility] OR       ALLERGIES:  Seasonal allergies    MEDS:    Current Outpatient Medications:     blood glucose (NO BRAND SPECIFIED) lancets standard, Use to test blood sugar daily or as directed., Disp: 100 each, Rfl: 3    blood glucose (NO BRAND SPECIFIED) test strip, Use to test blood sugar daily or as directed., Disp: 100 strip, Rfl: 3    blood glucose monitoring (NO BRAND SPECIFIED) meter device kit, Use to test blood sugar daily or as directed., Disp: 1 kit, Rfl: 1    cetirizine (ZYRTEC) 10 MG tablet, Take 10 mg by mouth daily, Disp: , Rfl:     clotrimazole (LOTRIMIN) 1 % external cream, Apply topically 2 times daily, Disp: 60 g, Rfl: 1    glipiZIDE (GLUCOTROL XL) 5 MG 24 hr tablet, TAKE 1 TABLET (5 MG) BY MOUTH DAILY, Disp: 90 tablet, Rfl: 3    lisinopril (ZESTRIL) 5 MG tablet, Take 1 tablet (5 mg) by mouth daily., Disp: 90 tablet, Rfl: 4    lovastatin (MEVACOR) 40 MG tablet, Take 1 tablet (40 mg) by mouth at bedtime., Disp: 90 tablet, Rfl: 4    metFORMIN (GLUCOPHAGE) 1000 MG tablet, Take 1 tablet (1,000 mg) by mouth 2 times daily (with meals)., Disp: 180 tablet, Rfl: 4    omeprazole (PRILOSEC) 40 MG DR capsule, TAKE 1 CAPSULE (40 MG) BY MOUTH DAILY, Disp: 90 capsule, Rfl: 3    triamcinolone (KENALOG) 0.1 % external cream, Apply topically 2 times daily, Disp: 60 g, Rfl: 1    SOCIAL HABITS:    History   Smoking Status    Former    Types: Cigarettes   Smokeless Tobacco    Never     Social History    Substance and Sexual Activity      Alcohol use: Not Currently        Alcohol/week: 0.0 standard drinks of alcohol      History   Drug Use No       FAMILY HISTORY:    Family History   Problem Relation Age of Onset    C.A.D. No family hx of     Diabetes No family hx of     Hypertension No family hx of     Cerebrovascular Disease No family hx of     Breast Cancer No family hx of     Cancer - colorectal No family hx of     Prostate  Cancer No family hx of        PE:  There were no vitals taken for this visit.  Wt Readings from Last 1 Encounters:   02/03/25 63 kg (138 lb 14.4 oz)     There is no height or weight on file to calculate BMI.    LABS:      Sodium   Date Value Ref Range Status   01/27/2025 131 (L) 135 - 145 mmol/L Final   02/02/2024 140 135 - 145 mmol/L Final   02/01/2024 135 135 - 145 mmol/L Final     Comment:     Reference intervals for this test were updated on 09/26/2023 to more accurately reflect our healthy population. There may be differences in the flagging of prior results with similar values performed with this method. Interpretation of those prior results can be made in the context of the updated reference intervals.    02/05/2021 136 133 - 144 mmol/L Final   02/07/2020 135 133 - 144 mmol/L Final   05/09/2019 133 133 - 144 mmol/L Final     Urea Nitrogen   Date Value Ref Range Status   01/27/2025 22.0 8.0 - 23.0 mg/dL Final   02/02/2024 21 7 - 30 mg/dL Final   02/01/2024 21.8 8.0 - 23.0 mg/dL Final   02/03/2023 18.1 8.0 - 23.0 mg/dL Final   01/31/2022 16 7 - 30 mg/dL Final   02/05/2021 18 7 - 30 mg/dL Final   02/07/2020 24 7 - 30 mg/dL Final   05/09/2019 23 7 - 30 mg/dL Final     Hemoglobin   Date Value Ref Range Status   10/20/2014 12.6 (L) 13.3 - 17.7 g/dL Final   08/17/2012 11.4 (L) 13.3 - 17.7 g/dL Final   08/16/2012 11.9 (L) 13.3 - 17.7 g/dL Final     Platelet Count   Date Value Ref Range Status   10/20/2014 120 (L) 150 - 450 10e9/L Final   08/17/2012 171 150 - 450 10e9/L Final   08/16/2012 172 150 - 450 10e9/L Final     INR   Date Value Ref Range Status   10/20/2014 1.01 0.86 - 1.14 Final   07/04/2012 1.06 0.86 - 1.14 Final       20 minutes spent on the day of encounter doing chart review, history and exam, documentation, and further activities as noted.       Darlene Larson NP  VASCULAR SURGERY

## 2025-06-26 ENCOUNTER — OFFICE VISIT (OUTPATIENT)
Dept: OTHER | Facility: CLINIC | Age: 70
End: 2025-06-26
Payer: COMMERCIAL

## 2025-06-26 VITALS — DIASTOLIC BLOOD PRESSURE: 66 MMHG | HEART RATE: 59 BPM | SYSTOLIC BLOOD PRESSURE: 108 MMHG

## 2025-06-26 DIAGNOSIS — I72.3 ILIAC ARTERY ANEURYSM, RIGHT: Primary | ICD-10-CM

## 2025-06-26 PROCEDURE — T1013 SIGN LANG/ORAL INTERPRETER: HCPCS | Mod: U4

## 2025-06-26 PROCEDURE — G0463 HOSPITAL OUTPT CLINIC VISIT: HCPCS

## 2025-06-26 NOTE — PROGRESS NOTES
[facility]        Patient is here for a  follow up.    Pt is currently taking no meds that would impact our treatment plan.    /66 (BP Location: Left arm, Patient Position: Chair, Cuff Size: Adult Regular)   Pulse 59     The provider has been notified that the patient has no concerns.     Questions patient would like addressed today are: N/A.    Refills are needed: N/A    Has homecare services and agency name:  No    Interp used magdiel ([place])   Mady Beasley MA

## 2025-07-07 ENCOUNTER — DOCUMENTATION ONLY (OUTPATIENT)
Dept: LAB | Facility: CLINIC | Age: 70
End: 2025-07-07
Payer: COMMERCIAL

## 2025-07-07 DIAGNOSIS — E11.9 TYPE 2 DIABETES MELLITUS WITHOUT COMPLICATION, WITHOUT LONG-TERM CURRENT USE OF INSULIN (H): Primary | ICD-10-CM

## 2025-07-07 NOTE — PROGRESS NOTES
Nader Alfaro has an upcoming lab appointment:    Future Appointments   Date Time Provider Department Center   7/28/2025 10:00 AM [facility]    [facility] 10[facility]     Patient is scheduled for the following lab(s): Fasting labs for PCP    There is no order available. Please review and place either future orders or HMPO (Review of Health Maintenance Protocol Orders), as appropriate.    Health Maintenance Due   Topic    ANNUAL REVIEW OF HM ORDERS      Ambrosio Mittal

## 2025-07-28 ENCOUNTER — LAB (OUTPATIENT)
Dept: LAB | Facility: CLINIC | Age: 70
End: 2025-07-28
Payer: COMMERCIAL

## 2025-07-28 DIAGNOSIS — E11.9 TYPE 2 DIABETES MELLITUS WITHOUT COMPLICATION, WITHOUT LONG-TERM CURRENT USE OF INSULIN (H): ICD-10-CM

## 2025-07-28 LAB
ANION GAP SERPL CALCULATED.3IONS-SCNC: 8 MMOL/L (ref 7–15)
BUN SERPL-MCNC: 27.8 MG/DL (ref 8–23)
CALCIUM SERPL-MCNC: 9.3 MG/DL (ref 8.8–10.4)
CHLORIDE SERPL-SCNC: 102 MMOL/L (ref 98–107)
CREAT SERPL-MCNC: 1.34 MG/DL (ref 0.67–1.17)
EGFRCR SERPLBLD CKD-EPI 2021: 58 ML/MIN/1.73M2
EST. AVERAGE GLUCOSE BLD GHB EST-MCNC: 174 MG/DL
GLUCOSE SERPL-MCNC: 167 MG/DL (ref 70–99)
HBA1C MFR BLD: 7.7 % (ref 0–5.6)
HCO3 SERPL-SCNC: 24 MMOL/L (ref 22–29)
POTASSIUM SERPL-SCNC: 5.1 MMOL/L (ref 3.4–5.3)
SODIUM SERPL-SCNC: 134 MMOL/L (ref 135–145)

## 2025-07-28 PROCEDURE — 36415 COLL VENOUS BLD VENIPUNCTURE: CPT

## 2025-07-28 PROCEDURE — 80048 BASIC METABOLIC PNL TOTAL CA: CPT

## 2025-07-28 PROCEDURE — 83036 HEMOGLOBIN GLYCOSYLATED A1C: CPT

## 2025-08-01 ENCOUNTER — MEDICAL CORRESPONDENCE (OUTPATIENT)
Dept: HEALTH INFORMATION MANAGEMENT | Facility: CLINIC | Age: 70
End: 2025-08-01
Payer: COMMERCIAL

## 2025-08-01 ENCOUNTER — TRANSFERRED RECORDS (OUTPATIENT)
Dept: HEALTH INFORMATION MANAGEMENT | Facility: CLINIC | Age: 70
End: 2025-08-01
Payer: COMMERCIAL

## 2025-08-04 ENCOUNTER — OFFICE VISIT (OUTPATIENT)
Dept: PEDIATRICS | Facility: CLINIC | Age: 70
End: 2025-08-04
Payer: COMMERCIAL

## 2025-08-04 VITALS
HEIGHT: 61 IN | HEART RATE: 61 BPM | OXYGEN SATURATION: 98 % | DIASTOLIC BLOOD PRESSURE: 66 MMHG | TEMPERATURE: 97.7 F | RESPIRATION RATE: 20 BRPM | WEIGHT: 135.88 LBS | SYSTOLIC BLOOD PRESSURE: 108 MMHG | BODY MASS INDEX: 25.65 KG/M2

## 2025-08-04 DIAGNOSIS — I10 ESSENTIAL HYPERTENSION: ICD-10-CM

## 2025-08-04 DIAGNOSIS — Z12.5 SCREENING FOR PROSTATE CANCER: ICD-10-CM

## 2025-08-04 DIAGNOSIS — E11.9 TYPE 2 DIABETES MELLITUS WITHOUT COMPLICATION, WITHOUT LONG-TERM CURRENT USE OF INSULIN (H): Primary | ICD-10-CM

## 2025-08-04 DIAGNOSIS — E78.5 HYPERLIPIDEMIA LDL GOAL <100: ICD-10-CM

## 2025-08-04 PROCEDURE — 3074F SYST BP LT 130 MM HG: CPT | Performed by: INTERNAL MEDICINE

## 2025-08-04 PROCEDURE — 99214 OFFICE O/P EST MOD 30 MIN: CPT | Performed by: INTERNAL MEDICINE

## 2025-08-04 PROCEDURE — 3078F DIAST BP <80 MM HG: CPT | Performed by: INTERNAL MEDICINE

## 2025-08-04 PROCEDURE — 3051F HG A1C>EQUAL 7.0%<8.0%: CPT | Performed by: INTERNAL MEDICINE

## 2025-08-04 PROCEDURE — 1126F AMNT PAIN NOTED NONE PRSNT: CPT | Performed by: INTERNAL MEDICINE

## 2025-08-04 RX ORDER — ATORVASTATIN CALCIUM 40 MG/1
40 TABLET, FILM COATED ORAL DAILY
Qty: 90 TABLET | Refills: 4 | Status: SHIPPED | OUTPATIENT
Start: 2025-08-04

## 2025-08-04 ASSESSMENT — PAIN SCALES - GENERAL: PAINLEVEL_OUTOF10: NO PAIN (0)
